# Patient Record
Sex: MALE | Race: BLACK OR AFRICAN AMERICAN | NOT HISPANIC OR LATINO | Employment: UNEMPLOYED | RURAL
[De-identification: names, ages, dates, MRNs, and addresses within clinical notes are randomized per-mention and may not be internally consistent; named-entity substitution may affect disease eponyms.]

---

## 2020-06-25 ENCOUNTER — HISTORICAL (OUTPATIENT)
Dept: ADMINISTRATIVE | Facility: HOSPITAL | Age: 55
End: 2020-06-25

## 2020-07-15 ENCOUNTER — HISTORICAL (OUTPATIENT)
Dept: ADMINISTRATIVE | Facility: HOSPITAL | Age: 55
End: 2020-07-15

## 2021-03-08 DIAGNOSIS — M25.551 RIGHT HIP PAIN: Primary | ICD-10-CM

## 2021-08-17 ENCOUNTER — OFFICE VISIT (OUTPATIENT)
Dept: FAMILY MEDICINE | Facility: CLINIC | Age: 56
End: 2021-08-17
Payer: COMMERCIAL

## 2021-08-17 VITALS
BODY MASS INDEX: 43.71 KG/M2 | SYSTOLIC BLOOD PRESSURE: 135 MMHG | WEIGHT: 256 LBS | OXYGEN SATURATION: 99 % | HEIGHT: 64 IN | RESPIRATION RATE: 20 BRPM | DIASTOLIC BLOOD PRESSURE: 87 MMHG | HEART RATE: 83 BPM

## 2021-08-17 DIAGNOSIS — Z00.00 ENCOUNTER FOR GENERAL ADULT MEDICAL EXAMINATION W/O ABNORMAL FINDINGS: ICD-10-CM

## 2021-08-17 DIAGNOSIS — Z11.59 ENCOUNTER FOR HEPATITIS C SCREENING TEST FOR LOW RISK PATIENT: Primary | ICD-10-CM

## 2021-08-17 DIAGNOSIS — Z23 ENCOUNTER FOR IMMUNIZATION: ICD-10-CM

## 2021-08-17 DIAGNOSIS — F31.9 BIPOLAR 1 DISORDER: ICD-10-CM

## 2021-08-17 DIAGNOSIS — Z13.220 SCREENING FOR LIPID DISORDERS: ICD-10-CM

## 2021-08-17 LAB
ALBUMIN SERPL BCP-MCNC: 3.1 G/DL (ref 3.5–5)
ALP SERPL-CCNC: 78 U/L (ref 45–115)
ALT SERPL W P-5'-P-CCNC: 22 U/L (ref 16–61)
ANION GAP SERPL CALCULATED.3IONS-SCNC: 9 MMOL/L (ref 7–16)
AST SERPL W P-5'-P-CCNC: 10 U/L (ref 15–37)
BASOPHILS # BLD AUTO: 0.11 K/UL (ref 0–0.2)
BASOPHILS NFR BLD AUTO: 1.1 % (ref 0–1)
BILIRUB DIRECT SERPL-MCNC: <0.1 MG/DL (ref 0–0.2)
BILIRUB SERPL-MCNC: 0.1 MG/DL (ref 0–1.2)
BUN SERPL-MCNC: 7 MG/DL (ref 7–18)
BUN/CREAT SERPL: 7 (ref 6–20)
CALCIUM SERPL-MCNC: 8.6 MG/DL (ref 8.5–10.1)
CHLORIDE SERPL-SCNC: 111 MMOL/L (ref 98–107)
CHOLEST SERPL-MCNC: 145 MG/DL (ref 0–200)
CHOLEST/HDLC SERPL: 4.4 {RATIO}
CO2 SERPL-SCNC: 26 MMOL/L (ref 21–32)
CREAT SERPL-MCNC: 1.06 MG/DL (ref 0.7–1.3)
DIFFERENTIAL METHOD BLD: ABNORMAL
EOSINOPHIL # BLD AUTO: 0.77 K/UL (ref 0–0.5)
EOSINOPHIL NFR BLD AUTO: 7.4 % (ref 1–4)
ERYTHROCYTE [DISTWIDTH] IN BLOOD BY AUTOMATED COUNT: 13.7 % (ref 11.5–14.5)
GLUCOSE SERPL-MCNC: 96 MG/DL (ref 74–106)
HCT VFR BLD AUTO: 42.5 % (ref 40–54)
HCV AB SER QL: NORMAL
HDLC SERPL-MCNC: 33 MG/DL (ref 40–60)
HGB BLD-MCNC: 13.8 G/DL (ref 13.5–18)
IMM GRANULOCYTES # BLD AUTO: 0.1 K/UL (ref 0–0.04)
IMM GRANULOCYTES NFR BLD: 1 % (ref 0–0.4)
LDLC SERPL CALC-MCNC: 71 MG/DL
LDLC/HDLC SERPL: 2.2 {RATIO}
LYMPHOCYTES # BLD AUTO: 2.65 K/UL (ref 1–4.8)
LYMPHOCYTES NFR BLD AUTO: 25.6 % (ref 27–41)
MCH RBC QN AUTO: 30.3 PG (ref 27–31)
MCHC RBC AUTO-ENTMCNC: 32.5 G/DL (ref 32–36)
MCV RBC AUTO: 93.4 FL (ref 80–96)
MONOCYTES # BLD AUTO: 1.16 K/UL (ref 0–0.8)
MONOCYTES NFR BLD AUTO: 11.2 % (ref 2–6)
MPC BLD CALC-MCNC: 10.2 FL (ref 9.4–12.4)
NEUTROPHILS # BLD AUTO: 5.57 K/UL (ref 1.8–7.7)
NEUTROPHILS NFR BLD AUTO: 53.7 % (ref 53–65)
NONHDLC SERPL-MCNC: 112 MG/DL
NRBC # BLD AUTO: 0 X10E3/UL
NRBC, AUTO (.00): 0 %
PLATELET # BLD AUTO: 300 K/UL (ref 150–400)
POTASSIUM SERPL-SCNC: 4.3 MMOL/L (ref 3.5–5.1)
PROT SERPL-MCNC: 6.7 G/DL (ref 6.4–8.2)
RBC # BLD AUTO: 4.55 M/UL (ref 4.6–6.2)
SODIUM SERPL-SCNC: 142 MMOL/L (ref 136–145)
TRIGL SERPL-MCNC: 206 MG/DL (ref 35–150)
VLDLC SERPL-MCNC: 41 MG/DL
WBC # BLD AUTO: 10.36 K/UL (ref 4.5–11)

## 2021-08-17 PROCEDURE — 80061 LIPID PANEL: CPT | Mod: ,,, | Performed by: CLINICAL MEDICAL LABORATORY

## 2021-08-17 PROCEDURE — 90715 TDAP VACCINE GREATER THAN OR EQUAL TO 7YO IM: ICD-10-PCS | Mod: ,,, | Performed by: INTERNAL MEDICINE

## 2021-08-17 PROCEDURE — 99386 PREV VISIT NEW AGE 40-64: CPT | Mod: 25,,, | Performed by: INTERNAL MEDICINE

## 2021-08-17 PROCEDURE — 86803 HEPATITIS C AB TEST: CPT | Mod: ,,, | Performed by: CLINICAL MEDICAL LABORATORY

## 2021-08-17 PROCEDURE — 90471 ZOSTER RECOMBINANT VACCINE: ICD-10-PCS | Mod: ,,, | Performed by: INTERNAL MEDICINE

## 2021-08-17 PROCEDURE — 90472 TDAP VACCINE GREATER THAN OR EQUAL TO 7YO IM: ICD-10-PCS | Mod: ,,, | Performed by: INTERNAL MEDICINE

## 2021-08-17 PROCEDURE — 82248 BILIRUBIN DIRECT: CPT | Mod: ,,, | Performed by: CLINICAL MEDICAL LABORATORY

## 2021-08-17 PROCEDURE — 90472 IMMUNIZATION ADMIN EACH ADD: CPT | Mod: ,,, | Performed by: INTERNAL MEDICINE

## 2021-08-17 PROCEDURE — 80053 COMPREHEN METABOLIC PANEL: CPT | Mod: ,,, | Performed by: CLINICAL MEDICAL LABORATORY

## 2021-08-17 PROCEDURE — 90750 HZV VACC RECOMBINANT IM: CPT | Mod: ,,, | Performed by: INTERNAL MEDICINE

## 2021-08-17 PROCEDURE — 85025 COMPLETE CBC W/AUTO DIFF WBC: CPT | Mod: ,,, | Performed by: CLINICAL MEDICAL LABORATORY

## 2021-08-17 PROCEDURE — 85025 CBC WITH DIFFERENTIAL: ICD-10-PCS | Mod: ,,, | Performed by: CLINICAL MEDICAL LABORATORY

## 2021-08-17 PROCEDURE — 82248 HEPATIC FUNCTION PANEL: ICD-10-PCS | Mod: ,,, | Performed by: CLINICAL MEDICAL LABORATORY

## 2021-08-17 PROCEDURE — 90715 TDAP VACCINE 7 YRS/> IM: CPT | Mod: ,,, | Performed by: INTERNAL MEDICINE

## 2021-08-17 PROCEDURE — 80053 BASIC METABOLIC PANEL: ICD-10-PCS | Mod: ,,, | Performed by: CLINICAL MEDICAL LABORATORY

## 2021-08-17 PROCEDURE — 90471 IMMUNIZATION ADMIN: CPT | Mod: ,,, | Performed by: INTERNAL MEDICINE

## 2021-08-17 PROCEDURE — 99386 PR PREVENTIVE VISIT,NEW,40-64: ICD-10-PCS | Mod: 25,,, | Performed by: INTERNAL MEDICINE

## 2021-08-17 PROCEDURE — 80061 LIPID PANEL: ICD-10-PCS | Mod: ,,, | Performed by: CLINICAL MEDICAL LABORATORY

## 2021-08-17 PROCEDURE — 86803 HEPATITIS C ANTIBODY: ICD-10-PCS | Mod: ,,, | Performed by: CLINICAL MEDICAL LABORATORY

## 2021-08-17 PROCEDURE — 90750 ZOSTER RECOMBINANT VACCINE: ICD-10-PCS | Mod: ,,, | Performed by: INTERNAL MEDICINE

## 2021-08-17 RX ORDER — ASPIRIN 325 MG
325 TABLET ORAL DAILY
COMMUNITY
End: 2022-01-06

## 2021-08-17 RX ORDER — ZALEPLON 5 MG/1
5 CAPSULE ORAL NIGHTLY PRN
COMMUNITY
End: 2022-08-31

## 2021-08-17 RX ORDER — CARIPRAZINE 3 MG/1
3 CAPSULE, GELATIN COATED ORAL DAILY
COMMUNITY

## 2021-12-16 ENCOUNTER — OFFICE VISIT (OUTPATIENT)
Dept: FAMILY MEDICINE | Facility: CLINIC | Age: 56
End: 2021-12-16
Payer: COMMERCIAL

## 2021-12-16 ENCOUNTER — APPOINTMENT (OUTPATIENT)
Dept: RADIOLOGY | Facility: CLINIC | Age: 56
End: 2021-12-16
Attending: INTERNAL MEDICINE
Payer: MEDICARE

## 2021-12-16 VITALS
SYSTOLIC BLOOD PRESSURE: 133 MMHG | RESPIRATION RATE: 19 BRPM | HEIGHT: 77 IN | OXYGEN SATURATION: 99 % | WEIGHT: 242 LBS | BODY MASS INDEX: 28.57 KG/M2 | DIASTOLIC BLOOD PRESSURE: 94 MMHG | HEART RATE: 89 BPM

## 2021-12-16 DIAGNOSIS — S61.411A CUT OF RIGHT HAND, INITIAL ENCOUNTER: Primary | ICD-10-CM

## 2021-12-16 DIAGNOSIS — Z11.59 ENCOUNTER FOR HEPATITIS C SCREENING TEST FOR LOW RISK PATIENT: ICD-10-CM

## 2021-12-16 DIAGNOSIS — S61.411A CUT OF RIGHT HAND, INITIAL ENCOUNTER: ICD-10-CM

## 2021-12-16 DIAGNOSIS — Z23 ENCOUNTER FOR IMMUNIZATION: ICD-10-CM

## 2021-12-16 DIAGNOSIS — M54.9 BACK PAIN, UNSPECIFIED BACK LOCATION, UNSPECIFIED BACK PAIN LATERALITY, UNSPECIFIED CHRONICITY: ICD-10-CM

## 2021-12-16 DIAGNOSIS — Z13.220 SCREENING FOR LIPID DISORDERS: ICD-10-CM

## 2021-12-16 PROCEDURE — 99214 PR OFFICE/OUTPT VISIT, EST, LEVL IV, 30-39 MIN: ICD-10-PCS | Mod: ,,, | Performed by: INTERNAL MEDICINE

## 2021-12-16 PROCEDURE — 73140 X-RAY EXAM OF FINGER(S): CPT | Mod: TC,RHCUB,RT | Performed by: INTERNAL MEDICINE

## 2021-12-16 PROCEDURE — 99214 OFFICE O/P EST MOD 30 MIN: CPT | Mod: ,,, | Performed by: INTERNAL MEDICINE

## 2021-12-16 RX ORDER — NAPROXEN 500 MG/1
500 TABLET ORAL 2 TIMES DAILY
Qty: 20 TABLET | Refills: 0 | Status: SHIPPED | OUTPATIENT
Start: 2021-12-16 | End: 2022-02-01 | Stop reason: SDUPTHER

## 2021-12-16 RX ORDER — CYCLOBENZAPRINE HCL 10 MG
10 TABLET ORAL 3 TIMES DAILY PRN
Qty: 20 TABLET | Refills: 0 | Status: SHIPPED | OUTPATIENT
Start: 2021-12-16 | End: 2021-12-26

## 2021-12-16 RX ORDER — SULFAMETHOXAZOLE AND TRIMETHOPRIM 800; 160 MG/1; MG/1
1 TABLET ORAL 2 TIMES DAILY
Qty: 20 TABLET | Refills: 0 | Status: SHIPPED | OUTPATIENT
Start: 2021-12-16 | End: 2022-01-06

## 2021-12-23 ENCOUNTER — IMMUNIZATION (OUTPATIENT)
Dept: FAMILY MEDICINE | Facility: CLINIC | Age: 56
End: 2021-12-23
Payer: MEDICAID

## 2021-12-23 DIAGNOSIS — Z23 NEED FOR VACCINATION: Primary | ICD-10-CM

## 2021-12-23 PROCEDURE — 0064A COVID-19, MRNA, LNP-S, PF, 100 MCG/0.25 ML DOSE VACCINE (MODERNA BOOSTER): ICD-10-PCS | Mod: ,,, | Performed by: INTERNAL MEDICINE

## 2021-12-23 PROCEDURE — 91306 COVID-19, MRNA, LNP-S, PF, 100 MCG/0.25 ML DOSE VACCINE (MODERNA BOOSTER): CPT | Mod: ,,, | Performed by: INTERNAL MEDICINE

## 2021-12-23 PROCEDURE — 91306 COVID-19, MRNA, LNP-S, PF, 100 MCG/0.25 ML DOSE VACCINE (MODERNA BOOSTER): ICD-10-PCS | Mod: ,,, | Performed by: INTERNAL MEDICINE

## 2021-12-23 PROCEDURE — 0064A COVID-19, MRNA, LNP-S, PF, 100 MCG/0.25 ML DOSE VACCINE (MODERNA BOOSTER): CPT | Mod: ,,, | Performed by: INTERNAL MEDICINE

## 2022-01-06 ENCOUNTER — OFFICE VISIT (OUTPATIENT)
Dept: FAMILY MEDICINE | Facility: CLINIC | Age: 57
End: 2022-01-06
Payer: COMMERCIAL

## 2022-01-06 VITALS
RESPIRATION RATE: 20 BRPM | WEIGHT: 243.63 LBS | HEART RATE: 53 BPM | OXYGEN SATURATION: 100 % | HEIGHT: 76 IN | SYSTOLIC BLOOD PRESSURE: 151 MMHG | DIASTOLIC BLOOD PRESSURE: 84 MMHG | BODY MASS INDEX: 29.67 KG/M2

## 2022-01-06 DIAGNOSIS — L03.818 CELLULITIS OF OTHER SPECIFIED SITE: ICD-10-CM

## 2022-01-06 DIAGNOSIS — G89.29 CHRONIC MIDLINE LOW BACK PAIN WITH BILATERAL SCIATICA: Primary | ICD-10-CM

## 2022-01-06 DIAGNOSIS — M54.42 CHRONIC MIDLINE LOW BACK PAIN WITH BILATERAL SCIATICA: Primary | ICD-10-CM

## 2022-01-06 DIAGNOSIS — N52.9 ERECTILE DYSFUNCTION, UNSPECIFIED ERECTILE DYSFUNCTION TYPE: ICD-10-CM

## 2022-01-06 DIAGNOSIS — T14.8XXA MUSCLE STRAIN: ICD-10-CM

## 2022-01-06 DIAGNOSIS — M54.41 CHRONIC MIDLINE LOW BACK PAIN WITH BILATERAL SCIATICA: Primary | ICD-10-CM

## 2022-01-06 PROCEDURE — 1160F PR REVIEW ALL MEDS BY PRESCRIBER/CLIN PHARMACIST DOCUMENTED: ICD-10-PCS | Mod: CPTII,,, | Performed by: INTERNAL MEDICINE

## 2022-01-06 PROCEDURE — 3077F PR MOST RECENT SYSTOLIC BLOOD PRESSURE >= 140 MM HG: ICD-10-PCS | Mod: CPTII,,, | Performed by: INTERNAL MEDICINE

## 2022-01-06 PROCEDURE — 1159F PR MEDICATION LIST DOCUMENTED IN MEDICAL RECORD: ICD-10-PCS | Mod: CPTII,,, | Performed by: INTERNAL MEDICINE

## 2022-01-06 PROCEDURE — 3008F PR BODY MASS INDEX (BMI) DOCUMENTED: ICD-10-PCS | Mod: CPTII,,, | Performed by: INTERNAL MEDICINE

## 2022-01-06 PROCEDURE — 99214 PR OFFICE/OUTPT VISIT, EST, LEVL IV, 30-39 MIN: ICD-10-PCS | Mod: ,,, | Performed by: INTERNAL MEDICINE

## 2022-01-06 PROCEDURE — 3079F DIAST BP 80-89 MM HG: CPT | Mod: CPTII,,, | Performed by: INTERNAL MEDICINE

## 2022-01-06 PROCEDURE — 99214 OFFICE O/P EST MOD 30 MIN: CPT | Mod: ,,, | Performed by: INTERNAL MEDICINE

## 2022-01-06 PROCEDURE — 3077F SYST BP >= 140 MM HG: CPT | Mod: CPTII,,, | Performed by: INTERNAL MEDICINE

## 2022-01-06 PROCEDURE — 3079F PR MOST RECENT DIASTOLIC BLOOD PRESSURE 80-89 MM HG: ICD-10-PCS | Mod: CPTII,,, | Performed by: INTERNAL MEDICINE

## 2022-01-06 PROCEDURE — 1160F RVW MEDS BY RX/DR IN RCRD: CPT | Mod: CPTII,,, | Performed by: INTERNAL MEDICINE

## 2022-01-06 PROCEDURE — 1159F MED LIST DOCD IN RCRD: CPT | Mod: CPTII,,, | Performed by: INTERNAL MEDICINE

## 2022-01-06 PROCEDURE — 3008F BODY MASS INDEX DOCD: CPT | Mod: CPTII,,, | Performed by: INTERNAL MEDICINE

## 2022-01-06 RX ORDER — SILDENAFIL 100 MG/1
50 TABLET, FILM COATED ORAL DAILY PRN
Qty: 7 TABLET | Refills: 2 | Status: SHIPPED | OUTPATIENT
Start: 2022-01-06 | End: 2022-08-31

## 2022-01-06 RX ORDER — HYDROCODONE BITARTRATE AND ACETAMINOPHEN 7.5; 325 MG/1; MG/1
1 TABLET ORAL 2 TIMES DAILY PRN
Qty: 14 TABLET | Refills: 0 | Status: SHIPPED | OUTPATIENT
Start: 2022-01-06 | End: 2022-02-01 | Stop reason: SDUPTHER

## 2022-01-06 RX ORDER — CEPHALEXIN 500 MG/1
500 CAPSULE ORAL 3 TIMES DAILY
Qty: 21 CAPSULE | Refills: 0 | Status: SHIPPED | OUTPATIENT
Start: 2022-01-06 | End: 2022-04-06

## 2022-01-10 NOTE — PROGRESS NOTES
Patient seen on 01/06/2022 patient is awake alert patient is in no acute distress patient complains of chronic muscles pain strain.  Patient complains of severe redness in his right thumb.  Patient complains of erectile dysfunction.  Patient states that the pain in his lower back is severe.    Blood pressure 150/84 temp 90° respirations 20 pulse 53 O2 sats 100 present    Lungs are clear no crackles or wheezing cardiovascular S1-S2 regular rate rhythm abdomen is soft positive bowel sounds nontender extremities right thumb is swollen and indurated red and tender to the touch    He does have diffuse tenderness in his lower back.    Patient has low back pain,, muscle strain, erectile dysfunction, is cellulitis of the right thumb.  The plan consult Occupational therapy, Keflex 500 mg 1 p.o. 3 times a day for 7 days Viagra 100 mg 1/2 tablet 30 minutes before intercourse generic take 30 minutes before intercourse and Norco 7.5 mg 1 p.o. b.i.d. p.r.n. severe pain disposition 21

## 2022-01-31 ENCOUNTER — OFFICE VISIT (OUTPATIENT)
Dept: FAMILY MEDICINE | Facility: CLINIC | Age: 57
End: 2022-01-31
Payer: COMMERCIAL

## 2022-01-31 VITALS
RESPIRATION RATE: 18 BRPM | OXYGEN SATURATION: 98 % | HEIGHT: 77 IN | DIASTOLIC BLOOD PRESSURE: 95 MMHG | SYSTOLIC BLOOD PRESSURE: 145 MMHG | BODY MASS INDEX: 29.97 KG/M2 | HEART RATE: 81 BPM | TEMPERATURE: 99 F | WEIGHT: 253.81 LBS

## 2022-01-31 DIAGNOSIS — N20.0 KIDNEY STONE: ICD-10-CM

## 2022-01-31 DIAGNOSIS — M54.42 CHRONIC MIDLINE LOW BACK PAIN WITH BILATERAL SCIATICA: Primary | ICD-10-CM

## 2022-01-31 DIAGNOSIS — R35.1 NOCTURIA: ICD-10-CM

## 2022-01-31 DIAGNOSIS — M41.9 SCOLIOSIS, UNSPECIFIED SCOLIOSIS TYPE, UNSPECIFIED SPINAL REGION: ICD-10-CM

## 2022-01-31 DIAGNOSIS — G89.29 CHRONIC MIDLINE LOW BACK PAIN WITH BILATERAL SCIATICA: Primary | ICD-10-CM

## 2022-01-31 DIAGNOSIS — M54.41 CHRONIC MIDLINE LOW BACK PAIN WITH BILATERAL SCIATICA: Primary | ICD-10-CM

## 2022-01-31 DIAGNOSIS — R35.0 URINARY FREQUENCY: ICD-10-CM

## 2022-01-31 LAB
BILIRUB UR QL STRIP: NEGATIVE
CLARITY UR: CLEAR
COLOR UR: YELLOW
GLUCOSE UR STRIP-MCNC: NEGATIVE MG/DL
KETONES UR STRIP-SCNC: NEGATIVE MG/DL
LEUKOCYTE ESTERASE UR QL STRIP: NEGATIVE
NITRITE UR QL STRIP: NEGATIVE
PH UR STRIP: 5.5 PH UNITS
PROT UR QL STRIP: NEGATIVE
RBC # UR STRIP: NEGATIVE /UL
SP GR UR STRIP: 1.02
UROBILINOGEN UR STRIP-ACNC: 0.2 MG/DL

## 2022-01-31 PROCEDURE — 1160F RVW MEDS BY RX/DR IN RCRD: CPT | Mod: CPTII,,, | Performed by: INTERNAL MEDICINE

## 2022-01-31 PROCEDURE — 3077F PR MOST RECENT SYSTOLIC BLOOD PRESSURE >= 140 MM HG: ICD-10-PCS | Mod: CPTII,,, | Performed by: INTERNAL MEDICINE

## 2022-01-31 PROCEDURE — 3008F PR BODY MASS INDEX (BMI) DOCUMENTED: ICD-10-PCS | Mod: CPTII,,, | Performed by: INTERNAL MEDICINE

## 2022-01-31 PROCEDURE — 3008F BODY MASS INDEX DOCD: CPT | Mod: CPTII,,, | Performed by: INTERNAL MEDICINE

## 2022-01-31 PROCEDURE — 99214 OFFICE O/P EST MOD 30 MIN: CPT | Mod: ,,, | Performed by: INTERNAL MEDICINE

## 2022-01-31 PROCEDURE — 81003 URINALYSIS, REFLEX TO URINE CULTURE: ICD-10-PCS | Mod: QW,,, | Performed by: CLINICAL MEDICAL LABORATORY

## 2022-01-31 PROCEDURE — 3080F PR MOST RECENT DIASTOLIC BLOOD PRESSURE >= 90 MM HG: ICD-10-PCS | Mod: CPTII,,, | Performed by: INTERNAL MEDICINE

## 2022-01-31 PROCEDURE — 3080F DIAST BP >= 90 MM HG: CPT | Mod: CPTII,,, | Performed by: INTERNAL MEDICINE

## 2022-01-31 PROCEDURE — 1160F PR REVIEW ALL MEDS BY PRESCRIBER/CLIN PHARMACIST DOCUMENTED: ICD-10-PCS | Mod: CPTII,,, | Performed by: INTERNAL MEDICINE

## 2022-01-31 PROCEDURE — 1159F PR MEDICATION LIST DOCUMENTED IN MEDICAL RECORD: ICD-10-PCS | Mod: CPTII,,, | Performed by: INTERNAL MEDICINE

## 2022-01-31 PROCEDURE — 81003 URINALYSIS AUTO W/O SCOPE: CPT | Mod: QW,,, | Performed by: CLINICAL MEDICAL LABORATORY

## 2022-01-31 PROCEDURE — 99214 PR OFFICE/OUTPT VISIT, EST, LEVL IV, 30-39 MIN: ICD-10-PCS | Mod: ,,, | Performed by: INTERNAL MEDICINE

## 2022-01-31 PROCEDURE — 3077F SYST BP >= 140 MM HG: CPT | Mod: CPTII,,, | Performed by: INTERNAL MEDICINE

## 2022-01-31 PROCEDURE — 1159F MED LIST DOCD IN RCRD: CPT | Mod: CPTII,,, | Performed by: INTERNAL MEDICINE

## 2022-01-31 NOTE — PROGRESS NOTES
Subjective:       Patient ID: Hola Burrell is a 57 y.o. male.    Chief Complaint: Medication Refill (Refill on pain meds)    Patient is here today for check up evaluation. Patient reports still has lower back pain. Recent Xray of back shows mild to moderate scoliosis and questionable kidney stone. Patient states he has noticed and increase in urination at night. Will US bilateral kidneys for possible calculi. Will also order Flomax 0.4 mg PO qhs and order UA with C&S. Patient endorses pain on palpation of lower back. Will refer to pain treatment. Xray of right finger reviewed and normal. Will follow in 2 months.       Current Medications:    Current Outpatient Medications:     cariprazine (VRAYLAR) 3 mg Cap, Take 3 mg by mouth once daily., Disp: , Rfl:     HYDROcodone-acetaminophen (NORCO) 7.5-325 mg per tablet, Take 1 tablet by mouth 2 (two) times daily as needed (Severe pain)., Disp: 14 tablet, Rfl: 0    naproxen (NAPROSYN) 500 MG tablet, Take 1 tablet (500 mg total) by mouth 2 (two) times daily., Disp: 20 tablet, Rfl: 0    sildenafiL (VIAGRA) 100 MG tablet, Take 0.5 tablets (50 mg total) by mouth daily as needed for Erectile Dysfunction (Take 30 minutes prior to intercourse)., Disp: 7 tablet, Rfl: 2    zaleplon (SONATA) 5 MG Cap, Take 5 mg by mouth nightly as needed., Disp: , Rfl:     cephALEXin (KEFLEX) 500 MG capsule, Take 1 capsule (500 mg total) by mouth 3 (three) times daily. (Patient not taking: Reported on 1/31/2022), Disp: 21 capsule, Rfl: 0    Last Labs:     No visits with results within 1 Month(s) from this visit.   Latest known visit with results is:   Office Visit on 08/17/2021   Component Date Value    Sodium 08/17/2021 142     Potassium 08/17/2021 4.3     Chloride 08/17/2021 111*    CO2 08/17/2021 26     Anion Gap 08/17/2021 9     Glucose 08/17/2021 96     BUN 08/17/2021 7     Creatinine 08/17/2021 1.06     BUN/Creatinine Ratio 08/17/2021 7     Calcium 08/17/2021 8.6      eGFR  08/17/2021 94     Hepatitis C Ab 08/17/2021 Non-Reactive     Total Protein 08/17/2021 6.7     Albumin 08/17/2021 3.1*    Bilirubin, Total 08/17/2021 0.1     Bilirubin, Direct 08/17/2021 <0.1     AST 08/17/2021 10*    ALT 08/17/2021 22     Alk Phos 08/17/2021 78     Triglycerides 08/17/2021 206*    Cholesterol 08/17/2021 145     HDL Cholesterol 08/17/2021 33*    Cholesterol/HDL Ratio (R* 08/17/2021 4.4     Non-HDL 08/17/2021 112     LDL Calculated 08/17/2021 71     LDL/HDL 08/17/2021 2.2     VLDL 08/17/2021 41     WBC 08/17/2021 10.36     RBC 08/17/2021 4.55*    Hemoglobin 08/17/2021 13.8     Hematocrit 08/17/2021 42.5     MCV 08/17/2021 93.4     MCH 08/17/2021 30.3     MCHC 08/17/2021 32.5     RDW 08/17/2021 13.7     Platelet Count 08/17/2021 300     MPV 08/17/2021 10.2     Neutrophils % 08/17/2021 53.7     Lymphocytes % 08/17/2021 25.6*    Monocytes % 08/17/2021 11.2*    Eosinophils % 08/17/2021 7.4*    Basophils % 08/17/2021 1.1*    Immature Granulocytes % 08/17/2021 1.0*    nRBC, Auto 08/17/2021 0.0     Neutrophils, Abs 08/17/2021 5.57     Lymphocytes, Absolute 08/17/2021 2.65     Monocytes, Absolute 08/17/2021 1.16*    Eosinophils, Absolute 08/17/2021 0.77*    Basophils, Absolute 08/17/2021 0.11     Immature Granulocytes, A* 08/17/2021 0.10*    nRBC, Absolute 08/17/2021 0.00     Diff Type 08/17/2021 Auto        Last Imaging:  X-Ray Lumbar Spine Ap And Lateral  Narrative: EXAMINATION:  XR LUMBAR SPINE AP AND LATERAL    CLINICAL HISTORY:  Dorsalgia, unspecified    FINDINGS:  There is a mild spondylolisthesis of L5 with respect to S1 and L4.  There is mild retrolisthesis of L3 with respect L4.  There are moderate osteophytes and disc space height loss throughout the lumbar spine with lower lumbar facet arthropathy.  No focal vertebral body height loss seen    Mild-to-moderate scoliosis with the convexity to the left    Vascular calcifications  present    There are questionable bilateral renal calculi  Impression: 1. Mild-to-moderate scoliosis and degenerative changes  2. Questionable nephrolithiasis  3. Mild spondylolisthesis of L5  4. Mild retrolisthesis at L3-4    Electronically signed by: Padmini Saul  Date:    12/16/2021  Time:    13:54  X-Ray Finger 2 or More Views Right  Narrative: EXAMINATION:  XR FINGER 2 OR MORE VIEWS RIGHT    CLINICAL HISTORY:  Laceration without foreign body of right hand, initial encounter    FINDINGS:  No acute osseous, articular, or soft tissue abnormality is seen  Impression: No acute pathology seen    Electronically signed by: Padmini Saul  Date:    12/16/2021  Time:    13:49         Review of Systems   Genitourinary: Positive for frequency and urgency.   Musculoskeletal: Positive for back pain.   All other systems reviewed and are negative.        Objective:      Physical Exam  Vitals reviewed.   Constitutional:       Appearance: Normal appearance.   Cardiovascular:      Rate and Rhythm: Normal rate and regular rhythm.      Pulses: Normal pulses.      Heart sounds: Normal heart sounds.   Pulmonary:      Effort: Pulmonary effort is normal.      Breath sounds: Normal breath sounds.   Abdominal:      General: Abdomen is flat. Bowel sounds are normal.      Palpations: Abdomen is soft.   Musculoskeletal:         General: Normal range of motion.      Cervical back: Normal range of motion and neck supple.   Skin:     General: Skin is warm and dry.   Neurological:      General: No focal deficit present.      Mental Status: He is alert and oriented to person, place, and time. Mental status is at baseline.         Assessment:       1. Chronic midline low back pain with bilateral sciatica     2. Urinary frequency  Urinalysis, Reflex to Urine Culture Urine, Clean Catch    Ambulatory referral/consult to Pain Clinic    Urinalysis, Reflex to Urine Culture Urine, Clean Catch   3. Nocturia     4. Kidney stone  US Kidney   5. Scoliosis,  unspecified scoliosis type, unspecified spinal region          Plan:         Hola was seen today for medication refill.    Diagnoses and all orders for this visit:    Chronic midline low back pain with bilateral sciatica    Urinary frequency  -     Urinalysis, Reflex to Urine Culture Urine, Clean Catch; Future  -     Ambulatory referral/consult to Pain Clinic; Future  -     Urinalysis, Reflex to Urine Culture Urine, Clean Catch    Nocturia    Kidney stone  -     US Kidney; Future    Scoliosis, unspecified scoliosis type, unspecified spinal region    Other orders  The following orders have not been finalized:  -     tamsulosin (FLOMAX) 0.4 mg Cap

## 2022-01-31 NOTE — PATIENT INSTRUCTIONS
Hola was seen today for medication refill.    Diagnoses and all orders for this visit:    Chronic midline low back pain with bilateral sciatica    Urinary frequency  -     Urinalysis, Reflex to Urine Culture Urine, Clean Catch; Future  -     Ambulatory referral/consult to Pain Clinic; Future  -     Urinalysis, Reflex to Urine Culture Urine, Clean Catch    Nocturia    Kidney stone  -     US Kidney; Future    Scoliosis, unspecified scoliosis type, unspecified spinal region    Other orders  The following orders have not been finalized:  -     tamsulosin (FLOMAX) 0.4 mg Cap

## 2022-02-01 RX ORDER — TAMSULOSIN HYDROCHLORIDE 0.4 MG/1
0.4 CAPSULE ORAL DAILY
Qty: 30 CAPSULE | Refills: 1 | Status: SHIPPED | OUTPATIENT
Start: 2022-02-01 | End: 2022-04-06 | Stop reason: SDUPTHER

## 2022-02-02 RX ORDER — NAPROXEN 500 MG/1
500 TABLET ORAL 2 TIMES DAILY
Qty: 20 TABLET | Refills: 0 | Status: SHIPPED | OUTPATIENT
Start: 2022-02-02 | End: 2022-04-06

## 2022-02-02 RX ORDER — HYDROCODONE BITARTRATE AND ACETAMINOPHEN 7.5; 325 MG/1; MG/1
1 TABLET ORAL 2 TIMES DAILY PRN
Qty: 14 TABLET | Refills: 0 | Status: SHIPPED | OUTPATIENT
Start: 2022-02-02 | End: 2022-08-31 | Stop reason: ALTCHOICE

## 2022-03-04 ENCOUNTER — OFFICE VISIT (OUTPATIENT)
Dept: PAIN MEDICINE | Facility: CLINIC | Age: 57
End: 2022-03-04
Payer: MEDICARE

## 2022-03-04 VITALS
BODY MASS INDEX: 29.83 KG/M2 | WEIGHT: 245 LBS | HEIGHT: 76 IN | DIASTOLIC BLOOD PRESSURE: 108 MMHG | HEART RATE: 114 BPM | SYSTOLIC BLOOD PRESSURE: 152 MMHG

## 2022-03-04 DIAGNOSIS — R35.0 URINARY FREQUENCY: ICD-10-CM

## 2022-03-04 DIAGNOSIS — M54.50 CHRONIC BILATERAL LOW BACK PAIN WITHOUT SCIATICA: ICD-10-CM

## 2022-03-04 DIAGNOSIS — Z79.899 ENCOUNTER FOR LONG-TERM (CURRENT) USE OF OTHER MEDICATIONS: Primary | ICD-10-CM

## 2022-03-04 DIAGNOSIS — M47.817 SPONDYLOSIS OF LUMBOSACRAL REGION WITHOUT MYELOPATHY OR RADICULOPATHY: ICD-10-CM

## 2022-03-04 DIAGNOSIS — G89.29 CHRONIC BILATERAL LOW BACK PAIN WITHOUT SCIATICA: ICD-10-CM

## 2022-03-04 LAB
CTP QC/QA: YES
POC (AMP) AMPHETAMINE: ABNORMAL
POC (BAR) BARBITURATES: NEGATIVE
POC (BUP) BUPRENORPHINE: NEGATIVE
POC (BZO) BENZODIAZEPINES: NEGATIVE
POC (COC) COCAINE: NEGATIVE
POC (MDMA) METHYLENEDIOXYMETHAMPHETAMINE 3,4: NEGATIVE
POC (MET) METHAMPHETAMINE: ABNORMAL
POC (MOP) OPIATES: NEGATIVE
POC (MTD) METHADONE: NEGATIVE
POC (OXY) OXYCODONE: NEGATIVE
POC (PCP) PHENCYCLIDINE: NEGATIVE
POC (TCA) TRICYCLIC ANTIDEPRESSANTS: NEGATIVE
POC TEMPERATURE (URINE): 90
POC THC: ABNORMAL

## 2022-03-04 PROCEDURE — 99203 OFFICE O/P NEW LOW 30 MIN: CPT | Mod: S$PBB,,, | Performed by: PAIN MEDICINE

## 2022-03-04 PROCEDURE — 3080F DIAST BP >= 90 MM HG: CPT | Mod: CPTII,,, | Performed by: PAIN MEDICINE

## 2022-03-04 PROCEDURE — 1159F PR MEDICATION LIST DOCUMENTED IN MEDICAL RECORD: ICD-10-PCS | Mod: CPTII,,, | Performed by: PAIN MEDICINE

## 2022-03-04 PROCEDURE — 1159F MED LIST DOCD IN RCRD: CPT | Mod: CPTII,,, | Performed by: PAIN MEDICINE

## 2022-03-04 PROCEDURE — 3077F PR MOST RECENT SYSTOLIC BLOOD PRESSURE >= 140 MM HG: ICD-10-PCS | Mod: CPTII,,, | Performed by: PAIN MEDICINE

## 2022-03-04 PROCEDURE — G0481 PR DRUG TEST DEF 8-14 CLASSES: ICD-10-PCS | Mod: ,,, | Performed by: CLINICAL MEDICAL LABORATORY

## 2022-03-04 PROCEDURE — 3008F PR BODY MASS INDEX (BMI) DOCUMENTED: ICD-10-PCS | Mod: CPTII,,, | Performed by: PAIN MEDICINE

## 2022-03-04 PROCEDURE — 80305 DRUG TEST PRSMV DIR OPT OBS: CPT | Mod: PBBFAC | Performed by: PAIN MEDICINE

## 2022-03-04 PROCEDURE — 3008F BODY MASS INDEX DOCD: CPT | Mod: CPTII,,, | Performed by: PAIN MEDICINE

## 2022-03-04 PROCEDURE — 3077F SYST BP >= 140 MM HG: CPT | Mod: CPTII,,, | Performed by: PAIN MEDICINE

## 2022-03-04 PROCEDURE — 99215 OFFICE O/P EST HI 40 MIN: CPT | Mod: PBBFAC | Performed by: PAIN MEDICINE

## 2022-03-04 PROCEDURE — 3080F PR MOST RECENT DIASTOLIC BLOOD PRESSURE >= 90 MM HG: ICD-10-PCS | Mod: CPTII,,, | Performed by: PAIN MEDICINE

## 2022-03-04 PROCEDURE — G0481 DRUG TEST DEF 8-14 CLASSES: HCPCS | Mod: ,,, | Performed by: CLINICAL MEDICAL LABORATORY

## 2022-03-04 PROCEDURE — 99203 PR OFFICE/OUTPT VISIT, NEW, LEVL III, 30-44 MIN: ICD-10-PCS | Mod: S$PBB,,, | Performed by: PAIN MEDICINE
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
## 2022-03-08 ENCOUNTER — TELEPHONE (OUTPATIENT)
Dept: PAIN MEDICINE | Facility: CLINIC | Age: 57
End: 2022-03-08
Payer: MEDICARE

## 2022-03-08 LAB
6-ACETYLMORPHINE, URINE (RUSH): NEGATIVE 10 NG/ML
7-AMINOCLONAZEPAM, URINE (RUSH): NEGATIVE 25 NG/ML
A-HYDROXYALPRAZOLAM, URINE (RUSH): NEGATIVE 25 NG/ML
ACETYL FENTANYL, URINE (RUSH): NEGATIVE 2.5 NG/ML
ACETYL NORFENTANYL OXALATE, URINE (RUSH): NEGATIVE 5 NG/ML
AMPHET UR QL SCN: >1000 100 NG/ML
BENZOYLECGONINE, URINE (RUSH): NEGATIVE 100 NG/ML
BUPRENORPHINE UR QL SCN: NEGATIVE 25 NG/ML
CODEINE, URINE (RUSH): NEGATIVE 25 NG/ML
CREAT UR-MCNC: 742 MG/DL (ref 39–259)
EDDP, URINE (RUSH): NEGATIVE 25 NG/ML
FENTANYL, URINE (RUSH): NEGATIVE 2.5 NG/ML
HYDROCODONE, URINE (RUSH): >250 25 NG/ML
HYDROMORPHONE, URINE (RUSH): 98.5 25 NG/ML
LORAZEPAM, URINE (RUSH): NEGATIVE 25 NG/ML
METHADONE UR QL SCN: NEGATIVE 25 NG/ML
METHAMPHET UR QL SCN: >1000 100 NG/ML
MORPHINE, URINE (RUSH): NEGATIVE 25 NG/ML
NORBUPRENORPHINE, URINE (RUSH): NEGATIVE 25 NG/ML
NORDIAZEPAM, URINE (RUSH): NEGATIVE 25 NG/ML
NORFENTANYL OXALATE, URINE (RUSH): NEGATIVE 5 NG/ML
NORHYDROCODONE, URINE (RUSH): >500 50 NG/ML
NOROXYCODONE HCL, URINE (RUSH): NEGATIVE 50 NG/ML
OXAZEPAM, URINE (RUSH): NEGATIVE 25 NG/ML
OXYCODONE UR QL SCN: NEGATIVE 25 NG/ML
OXYMORPHONE, URINE (RUSH): NEGATIVE 25 NG/ML
PH UR STRIP: 6 PH UNITS
SP GR UR STRIP: >=1.03
TAPENTADOL, URINE (RUSH): NEGATIVE 25 NG/ML
TEMAZEPAM, URINE (RUSH): NEGATIVE 25 NG/ML
THC-COOH, URINE (RUSH): >250 25 NG/ML
TRAMADOL, URINE (RUSH): NEGATIVE 100 NG/ML

## 2022-03-08 NOTE — TELEPHONE ENCOUNTER
Message sent to JOSSY Salazar to send termination letter per Dr Ramirez D/T illicit drugs on def uds.

## 2022-03-08 NOTE — TELEPHONE ENCOUNTER
----- Message from Rowena Ramirez MD sent at 3/8/2022  4:42 PM CST -----  Discharge due to illicit substances on UDS

## 2022-03-15 ENCOUNTER — CLINICAL SUPPORT (OUTPATIENT)
Dept: REHABILITATION | Facility: HOSPITAL | Age: 57
End: 2022-03-15
Attending: PAIN MEDICINE
Payer: MEDICARE

## 2022-03-15 DIAGNOSIS — M54.50 CHRONIC BILATERAL LOW BACK PAIN WITHOUT SCIATICA: ICD-10-CM

## 2022-03-15 DIAGNOSIS — G89.29 CHRONIC BILATERAL LOW BACK PAIN WITHOUT SCIATICA: ICD-10-CM

## 2022-03-15 PROCEDURE — 97162 PT EVAL MOD COMPLEX 30 MIN: CPT
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
## 2022-03-29 ENCOUNTER — CLINICAL SUPPORT (OUTPATIENT)
Dept: REHABILITATION | Facility: HOSPITAL | Age: 57
End: 2022-03-29
Payer: MEDICARE

## 2022-03-29 DIAGNOSIS — M54.50 CHRONIC BILATERAL LOW BACK PAIN WITHOUT SCIATICA: Primary | ICD-10-CM

## 2022-03-29 DIAGNOSIS — G89.29 CHRONIC BILATERAL LOW BACK PAIN WITHOUT SCIATICA: Primary | ICD-10-CM

## 2022-03-29 PROCEDURE — 97110 THERAPEUTIC EXERCISES: CPT

## 2022-03-29 NOTE — PROGRESS NOTES
Physical Therapy Treatment Note     Name: Hola Payton [facility] Number: [id number]    Therapy Diagnosis: No diagnosis found.  Physician: Rowena Ramirez MD    Visit Date: 3/29/2022  Physician Orders: PT Eval and Treat   Medical Diagnosis from Referral: M54.50,G89.29 (ICD-10-CM) - Chronic bilateral low back pain without sciatica  Evaluation Date: 3/15/2022  Authorization Period Expiration: 2022 Medicaid approval expires  Plan of Care Expiration: 2022  Progress Note Due: 4/15/2022  Visit # / Visits authorized:   FOTO: 50/60    Time In: 1300  Time Out: 1345  Total Billable Time: 40 minutes    Precautions: Standard    Subjective     Pt reports: I'm not hurting that much.    Response to previous treatment: no complaints  Functional change: none    Pain: 10  Location: bilateral back      Objective     Hola received therapeutic exercises to develop strength, endurance, ROM, flexibility, posture and core stabilization for 40 minutes including:  ltr x 10  Piriformis stretch 5 x 30 sh  Long sitting 1 x 30sh  Double knee to chest 5 x 30 sh  Pelvic tilt 10 x 10 sh  Abdominal crunch level 2 x 20  Seated flexion with silver ball:  Left, center, right    Hola received the following manual therapy techniques: Joint mobilizations, Manual traction, Myofacial release, Soft tissue Mobilization and Friction Massage were applied to the: 0 for 0 minutes, includin    Hola participated in neuromuscular re-education activities to improve: Balance, Coordination, Kinesthetic, Sense, Proprioception and Posture for 0 minutes. The following activities were included:  0    Hola participated in dynamic functional therapeutic activities to improve functional performance for 0  minutes, includin    Hola participated in gait training to improve functional mobility and safety for 0  minutes, includin    Hola received the following direct contact modalities after being cleared for  contraindications: 0    Hola received the following supervised modalities after being cleared for contraindications: 0    Hola received hot pack for 0 minutes to 0.    Hola received cold pack for 0 minutes to 0.      Home Exercises Provided and Patient Education Provided     Education provided:   - HEP    Written Home Exercises Provided: yes.  Exercises were reviewed and Hola was able to demonstrate them prior to the end of the session.  Hola demonstrated fair  understanding of the education provided.     See EMR under Patient Instructions for exercises provided prior visit.    Assessment      Pt prognosis is Fair.     Pt will continue to benefit from skilled outpatient physical therapy to address the deficits listed in the problem list box on initial evaluation, provide pt/family education and to maximize pt's level of independence in the home and community environment.     Pt's spiritual, cultural and educational needs considered and pt agreeable to plan of care and goals.     Anticipated barriers to physical therapy: chronic pain    Goals:  Short Term Goals: 4 weeks   1.  Decrease pain worst to 7/10  2.  Tolerate 45 minutes of exercise with <7/10 pain in lumbar spine.     Long Term Goals: 8 weeks   1.  Decrease pain worst to 5/10  2.  Tolerate 60 minutes of exercise/activity with <5/10 pain in back  3.  Generate a FOTO score of >60    Plan                                                                                                     2x/wk x 8 wks  CPT code CPT name Units/visit units/wk Units/treatment period   23766  Gt train 1 2 16   45016 Therex 3 6 48   77857 NMR 1 2 16   46105 US 1 2 16   22188 Traction 1 2 16   04432 IFC 1 2 16   75727 Manual 2 2 32        Continue with current plan of care.    OPAL LEIVA, PT

## 2022-04-01 DIAGNOSIS — M54.16 LUMBAR RADICULOPATHY: Primary | ICD-10-CM

## 2022-04-04 ENCOUNTER — CLINICAL SUPPORT (OUTPATIENT)
Dept: REHABILITATION | Facility: HOSPITAL | Age: 57
End: 2022-04-04
Attending: PAIN MEDICINE
Payer: MEDICARE

## 2022-04-04 ENCOUNTER — OFFICE VISIT (OUTPATIENT)
Dept: SPINE | Facility: CLINIC | Age: 57
End: 2022-04-04
Payer: MEDICARE

## 2022-04-04 ENCOUNTER — HOSPITAL ENCOUNTER (OUTPATIENT)
Dept: RADIOLOGY | Facility: HOSPITAL | Age: 57
Discharge: HOME | End: 2022-04-04
Attending: ORTHOPAEDIC SURGERY
Payer: COMMERCIAL

## 2022-04-04 DIAGNOSIS — M54.9 BACK PAIN, UNSPECIFIED BACK LOCATION, UNSPECIFIED BACK PAIN LATERALITY, UNSPECIFIED CHRONICITY: ICD-10-CM

## 2022-04-04 DIAGNOSIS — G89.29 CHRONIC BILATERAL LOW BACK PAIN WITHOUT SCIATICA: Primary | ICD-10-CM

## 2022-04-04 DIAGNOSIS — M54.16 LUMBAR RADICULOPATHY: ICD-10-CM

## 2022-04-04 DIAGNOSIS — M54.50 CHRONIC BILATERAL LOW BACK PAIN WITHOUT SCIATICA: Primary | ICD-10-CM

## 2022-04-04 PROCEDURE — 72110 XR LUMBAR SPINE 4-5 VIEW WITH BENDING VIEWS: ICD-10-PCS | Mod: 26,,, | Performed by: ORTHOPAEDIC SURGERY

## 2022-04-04 PROCEDURE — 97110 THERAPEUTIC EXERCISES: CPT

## 2022-04-04 PROCEDURE — 99204 PR OFFICE/OUTPT VISIT, NEW, LEVL IV, 45-59 MIN: ICD-10-PCS | Mod: 25,S$PBB,, | Performed by: ORTHOPAEDIC SURGERY

## 2022-04-04 PROCEDURE — 72110 X-RAY EXAM L-2 SPINE 4/>VWS: CPT | Mod: TC

## 2022-04-04 PROCEDURE — 72110 X-RAY EXAM L-2 SPINE 4/>VWS: CPT | Mod: 26,,, | Performed by: ORTHOPAEDIC SURGERY

## 2022-04-04 PROCEDURE — 99204 OFFICE O/P NEW MOD 45 MIN: CPT | Mod: 25,S$PBB,, | Performed by: ORTHOPAEDIC SURGERY

## 2022-04-04 PROCEDURE — 3044F HG A1C LEVEL LT 7.0%: CPT | Mod: CPTII,,, | Performed by: ORTHOPAEDIC SURGERY

## 2022-04-04 PROCEDURE — 99406 PR TOBACCO USE CESSATION INTERMEDIATE 3-10 MINUTES: ICD-10-PCS | Mod: S$PBB,,, | Performed by: ORTHOPAEDIC SURGERY

## 2022-04-04 PROCEDURE — 99213 OFFICE O/P EST LOW 20 MIN: CPT | Mod: PBBFAC | Performed by: ORTHOPAEDIC SURGERY

## 2022-04-04 PROCEDURE — 3044F PR MOST RECENT HEMOGLOBIN A1C LEVEL <7.0%: ICD-10-PCS | Mod: CPTII,,, | Performed by: ORTHOPAEDIC SURGERY

## 2022-04-04 PROCEDURE — 99406 BEHAV CHNG SMOKING 3-10 MIN: CPT | Mod: S$PBB,,, | Performed by: ORTHOPAEDIC SURGERY

## 2022-04-04 PROCEDURE — 97014 ELECTRIC STIMULATION THERAPY: CPT

## 2022-04-04 RX ORDER — GABAPENTIN 300 MG/1
300 CAPSULE ORAL 3 TIMES DAILY
Qty: 90 CAPSULE | Refills: 11 | Status: SHIPPED | OUTPATIENT
Start: 2022-04-04 | End: 2022-08-31

## 2022-04-04 NOTE — PROGRESS NOTES
MDM/time:  Greater than 45 minutes spent on this encounter including 15 minutes reviewing imaging and notes, 20 minutes with the patient, 10 minutes documentation    ASSESSMENT:  57 y.o. male with lumbar scoliosis lumbar spondylosis with radiculopathy    PLAN:  physical therapy lumbar spine  Neurontin 300 mg 1 tablet 3 times a day  Nicotine dependence discussed smoking cessation  Follow-up in 3 months    HPI:  57 y.o. male here for evaluation of low back pain that radiates into bilateral hips.  Patient reports years of back pain was working as a  and fell and hurt his back.  He reports increased pain over time more constant dull pain.  Patient denies difficulty with  strength.  Denies difficulty with balance no recent falls.  Denies bladder or bowel incontinence.  Difficulty with walking distance, must stop and rest.  Currently taking no medications for pain.  Is currently in physical therapy.  No recent MRI.  No prior spine surgery.  Patient currently smokes 1 pack of cigarettes per day.    IMAGIN2022 lumbar spine x-ray reviewed showed:  On the AP there is lumbar curvature to the left.  There are 5 non-rib-bearing lumbar vertebrae.  On the lateral there is decreased lumbar lordosis.  There is spondylotic disease with decreased disc height and osteophyte formation noted.  No fractures or listhesis noted.  No instability on flexion-extension views.        Past Medical History:   Diagnosis Date    Asthma     Bilateral primary osteoarthritis of hip     Bipolar disorder     hyperlipidemia     Hypertension      History reviewed. No pertinent surgical history.  Social History     Tobacco Use    Smoking status: Current Every Day Smoker     Packs/day: 1.00     Years: 40.00     Pack years: 40.00     Types: Cigarettes    Smokeless tobacco: Never Used   Substance Use Topics    Alcohol use: Not Currently    Drug use: Not Currently     Types: Marijuana      Current Outpatient Medications    Medication Instructions    cephALEXin (KEFLEX) 500 mg, Oral, 3 times daily    HYDROcodone-acetaminophen (NORCO) 7.5-325 mg per tablet 1 tablet, Oral, 2 times daily PRN    naproxen (NAPROSYN) 500 mg, Oral, 2 times daily    sildenafiL (VIAGRA) 50 mg, Oral, Daily PRN    tamsulosin (FLOMAX) 0.4 mg, Oral, Daily    VRAYLAR 3 mg, Oral, Daily    zaleplon (SONATA) 5 mg, Oral, Nightly PRN        EXAM:  Constitutional  General Appearance:  There is no height or weight on file to calculate BMI., NAD  Psychiatric   Orientation: Oriented to time, oriented to place, oriented to person  Mood and Affect: Active and alert, normal mood, normal affect  Gait and Station   Appearance:  Normal gait, normal tandem gait, able to walk on toes, able to walk on heels    LUMBAR  Musculoskeletal System   Hips: Normal appearance, no leg length discrepancy, normal motion; left, normal motion; right    Lumbar Spine                   Inspection:  Normal alignment, normal sagittal balance                  Range of motion:  Decreased flexion, extension, lateral bending, rotation. Pain with range of motion                  Bony Palpation of the Lumbar Spine:  No tenderness of the spinous process, no tenderness of the sacrum, no tenderness of the coccyx                  Bony Palpation of the Right Hip:  No tenderness of the iliac crest, no tenderness of the sciatic notch, no tenderness of the SI joint                  Bony Palpation of the Left Hip:  No tenderness of the iliac crest, no tenderness of the sciatic notch, no tenderness of the SI joint                  Soft Tissue Palpation on the Right:  No tenderness of the paraspinal region, no tenderness of the iliolumbar region                  Soft Tissue Palpation on the Left:  No tenderness of the paraspinal region, no tenderness of the iliolumbar region    Motor Strength   L1 Right:  Hip flexion iliopsoas 5/5    L1 Left:  Hip flexion iliopsoas 5/5              L2-L4 Right:  Knee extension  quadriceps 5/5, tibialis anterior 5/5              L2-L4 Left:  Knee extension quadriceps 5/5, tibialis anterior 5/5   L5 Right:  Extensor hallucis llongus 5/5,    L5 Left:  Extensor hallucis longus 5/5,    S1 Right:  Plantar flexion gastrocnemius 5/5   S1 Left:  Plantar flexion gastrocnemius 5/5    Neurological System   Ankle Reflex Right:  normal   Ankle Reflex Left: normal   Knee Reflex Right:  normal   Knee Reflex Left:  normal   Sensation on the Right:  L2 normal, L3 normal, L4 normal, L5 normal, S1 normal   Sensation on the Left:  L2 normal, L3 normal, L4 normal, L5 normal, S1 normal              Special Test on the Right:  Seated straight leg raising test negative, no clonus of the ankle              Special Test on the Left:  Seated straight leg raising test negative, no clonus of the ankle    Skin   Lumbosacral Spine:  Normal skin    Cardiovascular System   Arterial Pulses Right:  Posterior tibialis normal, dorsalis pedis normal   Arterial Pulses Left:  Posterior tibialis normal, dorsalis pedis normal   Edema Right: None   Edema Left:  None

## 2022-04-04 NOTE — PROGRESS NOTES
Physical Therapy Treatment Note     Name: Hola Payton [facility] Number: [id number]    Therapy Diagnosis:   Encounter Diagnosis   Name Primary?    Chronic bilateral low back pain without sciatica Yes     Physician: Rowena Ramirez MD    Visit Date: 2022  Physician Orders: PT Eval and Treat   Medical Diagnosis from Referral: M54.50,G89.29 (ICD-10-CM) - Chronic bilateral low back pain without sciatica  Evaluation Date: 3/15/2022  Authorization Period Expiration: 2022 Medicaid approval expires  Plan of Care Expiration: 2022  Progress Note Due: 4/15/2022  Visit # / Visits authorized: 3/17  FOTO: 50/60    Time In: 1515  Time Out: 1600  Total Billable Time: 45 minutes    Precautions: Standard    Subjective     Pt reports: Jennifer mowed my yard and digging up dirt    Response to previous treatment: no complaints  Functional change: none    Pain: 8/10  Location: bilateral back      Objective     Hola received therapeutic exercises to develop strength, endurance, ROM, flexibility, posture and core stabilization for 30 minutes including:  ltr x 10  Piriformis stretch 5 x 30 sh  Double knee to chest 5 x 30 sh  Pelvic tilt 10 x 10 sh  Abdominal crunch level 2 x 20  Hamstring stretch 4 x 30 sh  Sciatic nerve glide x 5    Hola received the following manual therapy techniques: Joint mobilizations, Manual traction, Myofacial release, Soft tissue Mobilization and Friction Massage were applied to the: 0 for 0 minutes, includin    Hola participated in neuromuscular re-education activities to improve: Balance, Coordination, Kinesthetic, Sense, Proprioception and Posture for 0 minutes. The following activities were included:  0    Hola participated in dynamic functional therapeutic activities to improve functional performance for 0  minutes, includin    Hola participated in gait training to improve functional mobility and safety for 0  minutes, includin    Hola received the following  direct contact modalities after being cleared for contraindications: 0    Hola received the following supervised modalities after being cleared for contraindications: INTERFERENTIAL x 7 volts x15 minutes to lumbar spine.  Patient tolerated /s any adverse affects.    Hola received hot pack for 0 minutes to 0.    Hola received cold pack for 0 minutes to 0.      Home Exercises Provided and Patient Education Provided     Education provided:   - HEP    Written Home Exercises Provided: yes.  Exercises were reviewed and Hola was able to demonstrate them prior to the end of the session.  Hola demonstrated fair  understanding of the education provided.     See EMR under Patient Instructions for exercises provided prior visit.    Assessment      Pt prognosis is Fair.     Pt will continue to benefit from skilled outpatient physical therapy to address the deficits listed in the problem list box on initial evaluation, provide pt/family education and to maximize pt's level of independence in the home and community environment.     Pt's spiritual, cultural and educational needs considered and pt agreeable to plan of care and goals.     Anticipated barriers to physical therapy: chronic pain    Goals:  Short Term Goals: 4 weeks   1.  Decrease pain worst to 7/10  2.  Tolerate 45 minutes of exercise with <7/10 pain in lumbar spine.     Long Term Goals: 8 weeks   1.  Decrease pain worst to 5/10  2.  Tolerate 60 minutes of exercise/activity with <5/10 pain in back  3.  Generate a FOTO score of >60    Plan                 2 therex, 1 IFC   [id number], 1x97014                                                                                   2x/wk x 8 wks  CPT code CPT name Units/visit units/wk Units/treatment period   34435  Gt train 1 2 16   96683 Therex 3 6 48   93845 NMR 1 2 16   68784 US 1 2 16   09177 Traction 1 2 16   37591 IFC 1 2 16   62868 Manual 2 2 32        Continue with current plan of care.    OPAL LEIVA,  PT

## 2022-04-04 NOTE — PROGRESS NOTES
Smoking Cessation counseling    Time spent:  3-10 minutes (51315)    We discussed the importance, over both the short and long-term, of smoking cessation; reviewed the patient's willingness to quit; overall history and current use of tobacco smoking products; that there are a variety of methods to help with smoking diminution and cessation (stopping alone, using nicotine substitution medications/gums, Chantix, other medications, etcetera, which the patient will also discuss with his or her primary care physician); that the patient should set a date for smoking cessation; and the patient will follow up with his or her primary care physician for further support and oversight.    We also discussed that for the patient to have surgery while using nicotine, wound healing may be compromised relative to those who do not smoke; that recovery from anesthesia may sometimes be more difficult; and that quitting may decrease the heart, vascular, pulmonary effects in the short term as well as over the long term.  Smoking cessation may be useful and important for any patient who will have a fusion as part of surgery or have bone or tissue that has regrown heal (bone fusions, wound closures, etc.)  since surgical results with respect to wound healing, susceptibility to recovery from infection, bone fusion, and tissue and blood vessel health may be impaired or less optimal in smokers than nonsmokers.  We talked about the elevated risk of surgical bone fusion failure in the setting of smoking and the potential need for a higher-risk revision surgery should fusion not occur.    I reviewed this with the patient in detail and all questions were answered.  We discussed nature of the patient's condition; real alternatives and realistic options; pros and cons, risks and benefits of all the options, in detail.  I believe the patient understands the above and wishes to proceed as outlined.

## 2022-04-04 NOTE — PROGRESS NOTES
AP, lateral, flexion/extension views of the lumbar spine reviewed    On the AP there is lumbar curvature to the left.  There are 5 non-rib-bearing lumbar vertebrae.  On the lateral there is decreased lumbar lordosis.  There is spondylotic disease with decreased disc height and osteophyte formation noted.  No fractures or listhesis noted.  No instability on flexion-extension views.    Impression:  Spondylotic changes of the lumbar spine as noted above

## 2022-04-06 ENCOUNTER — CLINICAL SUPPORT (OUTPATIENT)
Dept: REHABILITATION | Facility: HOSPITAL | Age: 57
End: 2022-04-06
Attending: PAIN MEDICINE
Payer: MEDICARE

## 2022-04-06 ENCOUNTER — OFFICE VISIT (OUTPATIENT)
Dept: FAMILY MEDICINE | Facility: CLINIC | Age: 57
End: 2022-04-06
Payer: COMMERCIAL

## 2022-04-06 ENCOUNTER — APPOINTMENT (OUTPATIENT)
Dept: RADIOLOGY | Facility: CLINIC | Age: 57
End: 2022-04-06
Attending: INTERNAL MEDICINE
Payer: MEDICARE

## 2022-04-06 VITALS
HEIGHT: 76 IN | SYSTOLIC BLOOD PRESSURE: 138 MMHG | RESPIRATION RATE: 20 BRPM | OXYGEN SATURATION: 96 % | BODY MASS INDEX: 31.13 KG/M2 | WEIGHT: 255.63 LBS | DIASTOLIC BLOOD PRESSURE: 91 MMHG | TEMPERATURE: 98 F | HEART RATE: 89 BPM

## 2022-04-06 DIAGNOSIS — Z13.1 SCREENING FOR DIABETES MELLITUS (DM): ICD-10-CM

## 2022-04-06 DIAGNOSIS — G89.29 CHRONIC BILATERAL LOW BACK PAIN WITHOUT SCIATICA: Primary | ICD-10-CM

## 2022-04-06 DIAGNOSIS — N40.0 BENIGN PROSTATIC HYPERPLASIA, UNSPECIFIED WHETHER LOWER URINARY TRACT SYMPTOMS PRESENT: Primary | ICD-10-CM

## 2022-04-06 DIAGNOSIS — M54.50 CHRONIC BILATERAL LOW BACK PAIN WITHOUT SCIATICA: Primary | ICD-10-CM
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
## 2022-04-06 DIAGNOSIS — S09.90XA INJURY OF HEAD, INITIAL ENCOUNTER: ICD-10-CM

## 2022-04-06 DIAGNOSIS — M54.50 CHRONIC BILATERAL LOW BACK PAIN WITHOUT SCIATICA: ICD-10-CM

## 2022-04-06 DIAGNOSIS — M41.9 SCOLIOSIS, UNSPECIFIED SCOLIOSIS TYPE, UNSPECIFIED SPINAL REGION: ICD-10-CM

## 2022-04-06 DIAGNOSIS — G89.29 CHRONIC BILATERAL LOW BACK PAIN WITHOUT SCIATICA: ICD-10-CM

## 2022-04-06 LAB
ANION GAP SERPL CALCULATED.3IONS-SCNC: 5 MMOL/L (ref 7–16)
BUN SERPL-MCNC: 7 MG/DL (ref 7–18)
BUN/CREAT SERPL: 7 (ref 6–20)
CALCIUM SERPL-MCNC: 8.9 MG/DL (ref 8.5–10.1)
CHLORIDE SERPL-SCNC: 109 MMOL/L (ref 98–107)
CO2 SERPL-SCNC: 29 MMOL/L (ref 21–32)
CREAT SERPL-MCNC: 0.99 MG/DL (ref 0.7–1.3)
EST. AVERAGE GLUCOSE BLD GHB EST-MCNC: 90 MG/DL
GLUCOSE SERPL-MCNC: 94 MG/DL (ref 74–106)
HBA1C MFR BLD HPLC: 5.3 % (ref 4.5–6.6)
POTASSIUM SERPL-SCNC: 3.8 MMOL/L (ref 3.5–5.1)
SODIUM SERPL-SCNC: 139 MMOL/L (ref 136–145)

## 2022-04-06 PROCEDURE — 80048 BASIC METABOLIC PNL TOTAL CA: CPT | Mod: ,,, | Performed by: CLINICAL MEDICAL LABORATORY

## 2022-04-06 PROCEDURE — 3080F PR MOST RECENT DIASTOLIC BLOOD PRESSURE >= 90 MM HG: ICD-10-PCS | Mod: CPTII,,, | Performed by: INTERNAL MEDICINE

## 2022-04-06 PROCEDURE — 80048 BASIC METABOLIC PANEL: ICD-10-PCS | Mod: ,,, | Performed by: CLINICAL MEDICAL LABORATORY

## 2022-04-06 PROCEDURE — 83036 HEMOGLOBIN A1C: ICD-10-PCS | Mod: ,,, | Performed by: CLINICAL MEDICAL LABORATORY

## 2022-04-06 PROCEDURE — 1159F MED LIST DOCD IN RCRD: CPT | Mod: CPTII,,, | Performed by: INTERNAL MEDICINE

## 2022-04-06 PROCEDURE — 3075F PR MOST RECENT SYSTOLIC BLOOD PRESS GE 130-139MM HG: ICD-10-PCS | Mod: CPTII,,, | Performed by: INTERNAL MEDICINE

## 2022-04-06 PROCEDURE — 99214 PR OFFICE/OUTPT VISIT, EST, LEVL IV, 30-39 MIN: ICD-10-PCS | Mod: ,,, | Performed by: INTERNAL MEDICINE

## 2022-04-06 PROCEDURE — 1159F PR MEDICATION LIST DOCUMENTED IN MEDICAL RECORD: ICD-10-PCS | Mod: CPTII,,, | Performed by: INTERNAL MEDICINE

## 2022-04-06 PROCEDURE — 1160F PR REVIEW ALL MEDS BY PRESCRIBER/CLIN PHARMACIST DOCUMENTED: ICD-10-PCS | Mod: CPTII,,, | Performed by: INTERNAL MEDICINE

## 2022-04-06 PROCEDURE — 3008F PR BODY MASS INDEX (BMI) DOCUMENTED: ICD-10-PCS | Mod: CPTII,,, | Performed by: INTERNAL MEDICINE

## 2022-04-06 PROCEDURE — 3044F HG A1C LEVEL LT 7.0%: CPT | Mod: CPTII,,, | Performed by: INTERNAL MEDICINE

## 2022-04-06 PROCEDURE — 3044F PR MOST RECENT HEMOGLOBIN A1C LEVEL <7.0%: ICD-10-PCS | Mod: CPTII,,, | Performed by: INTERNAL MEDICINE

## 2022-04-06 PROCEDURE — 70260 X-RAY EXAM OF SKULL: CPT | Mod: TC,RHCUB | Performed by: INTERNAL MEDICINE

## 2022-04-06 PROCEDURE — 97110 THERAPEUTIC EXERCISES: CPT

## 2022-04-06 PROCEDURE — 83036 HEMOGLOBIN GLYCOSYLATED A1C: CPT | Mod: ,,, | Performed by: CLINICAL MEDICAL LABORATORY

## 2022-04-06 PROCEDURE — 97140 MANUAL THERAPY 1/> REGIONS: CPT

## 2022-04-06 PROCEDURE — 3075F SYST BP GE 130 - 139MM HG: CPT | Mod: CPTII,,, | Performed by: INTERNAL MEDICINE

## 2022-04-06 PROCEDURE — 3008F BODY MASS INDEX DOCD: CPT | Mod: CPTII,,, | Performed by: INTERNAL MEDICINE

## 2022-04-06 PROCEDURE — 1160F RVW MEDS BY RX/DR IN RCRD: CPT | Mod: CPTII,,, | Performed by: INTERNAL MEDICINE

## 2022-04-06 PROCEDURE — 99214 OFFICE O/P EST MOD 30 MIN: CPT | Mod: ,,, | Performed by: INTERNAL MEDICINE

## 2022-04-06 PROCEDURE — 3080F DIAST BP >= 90 MM HG: CPT | Mod: CPTII,,, | Performed by: INTERNAL MEDICINE

## 2022-04-06 RX ORDER — CYCLOBENZAPRINE HCL 10 MG
10 TABLET ORAL NIGHTLY
Qty: 20 TABLET | Refills: 0 | Status: SHIPPED | OUTPATIENT
Start: 2022-04-06 | End: 2022-04-26

## 2022-04-06 RX ORDER — TAMSULOSIN HYDROCHLORIDE 0.4 MG/1
0.4 CAPSULE ORAL DAILY
Qty: 90 CAPSULE | Refills: 1 | Status: SHIPPED | OUTPATIENT
Start: 2022-04-06 | End: 2022-04-20 | Stop reason: SDUPTHER

## 2022-04-06 RX ORDER — NAPROXEN 500 MG/1
500 TABLET ORAL 2 TIMES DAILY PRN
Qty: 20 TABLET | Refills: 0 | Status: SHIPPED | OUTPATIENT
Start: 2022-04-06 | End: 2022-08-31 | Stop reason: ALTCHOICE

## 2022-04-06 NOTE — PROGRESS NOTES
Physical Therapy Treatment Note     Name: Hola Payton [facility] Number: [id number]    Therapy Diagnosis:   Encounter Diagnosis   Name Primary?    Chronic bilateral low back pain without sciatica Yes     Physician: Rowena Ramirez MD    Visit Date: 4/6/2022  Physician Orders: PT Eval and Treat   Medical Diagnosis from Referral: M54.50,G89.29 (ICD-10-CM) - Chronic bilateral low back pain without sciatica  Evaluation Date: 3/15/2022  Authorization Period Expiration: 5/27/2022 Medicaid approval expires  Plan of Care Expiration: 5/13/2022  Progress Note Due: 4/15/2022  Visit # / Visits authorized: 4/17  FOTO: 50/60    Time In: 0705  Time Out: 0745  Total Billable Time: 45 minutes    Precautions: Standard    Subjective     Pt reports: Im feeling better than I did the other day    Response to previous treatment: no complaints  Functional change: none    Pain: 4/10  Location: bilateral back      Objective     Hola received therapeutic exercises to develop strength, endurance, ROM, flexibility, posture and core stabilization for 30 minutes including:  ltr x 10  Piriformis stretch 5 x 30 sh  Double knee to chest 5 x 30 sh  Pelvic tilt 10 x 10 sh  Abdominal crunch level 2 x 20  Hamstring stretch 4 x 30 sh  Sciatic nerve glide x 5  Left sidelying rotation [id number]  Seated flexion with silver ball:  Left 5 x 10sh, right 5x10sh  cybex hip abduction #3 x 30 daniel  cybex hip / #4 x 30 daniel    Hola received the following manual therapy techniques: Joint mobilizations, Manual traction, Myofacial release, Soft tissue Mobilization and Friction Massage were applied to the: 0 for 0 minutes, including:  Prone press up x 10  Left deepa axis distraction x 10    Hola participated in neuromuscular re-education activities to improve: Balance, Coordination, Kinesthetic, Sense, Proprioception and Posture for 0 minutes. The following activities were included:  0    Hola participated in dynamic functional therapeutic activities to  improve functional performance for 0  minutes, includin    Hola participated in gait training to improve functional mobility and safety for 0  minutes, includin    Hola received the following direct contact modalities after being cleared for contraindications: 0    Hola received the following supervised modalities after being cleared for contraindications: INTERFERENTIAL x 7 volts x 0 minutes to lumbar spine.  Patient tolerated /s any adverse affects.    Hola received hot pack for 0 minutes to 0.    Hola received cold pack for 0 minutes to 0.      Home Exercises Provided and Patient Education Provided     Education provided:   - HEP    Written Home Exercises Provided: yes.  Exercises were reviewed and Hola was able to demonstrate them prior to the end of the session.  Hola demonstrated fair  understanding of the education provided.     See EMR under Patient Instructions for exercises provided prior visit.    Assessment   Patient tolerated /s increased complaint.  Patient reported decreased pain on left SI joint /p mobilizations.    Pt prognosis is Fair.     Pt will continue to benefit from skilled outpatient physical therapy to address the deficits listed in the problem list box on initial evaluation, provide pt/family education and to maximize pt's level of independence in the home and community environment.     Pt's spiritual, cultural and educational needs considered and pt agreeable to plan of care and goals.     Anticipated barriers to physical therapy: chronic pain    Goals:  Short Term Goals: 4 weeks   1.  Decrease pain worst to 7/10  2.  Tolerate 45 minutes of exercise with <7/10 pain in lumbar spine.     Long Term Goals: 8 weeks   1.  Decrease pain worst to 5/10  2.  Tolerate 60 minutes of exercise/activity with <5/10 pain in back  3.  Generate a FOTO score of >60    Plan                                                                                       2x/wk x 8 wks  CPT  code CPT name Units/visit units/wk Units/treatment period   26275  Gt train 1 2 16   80111 Therex 3 6 48   00542 NMR 1 2 16   30297 US 1 2 16   30935 Traction 1 2 16   12218 IFC 1 2 16   84072 Manual 2 2 32        Continue with current plan of care.    OPAL LEIVA, PT     2 therex 43845  1 manual 53139

## 2022-04-07 NOTE — PROGRESS NOTES
Subjective:       Patient ID: Hola Burrell is a 57 y.o. male.    Chief Complaint: Results (xray) and Fall (About 2 weks ago, says his blood sugar dropped)    Patient is here today for a follow up evaluation. Patient pressure is increased today on intake. Patient is concerned of possible low blood sugar. States he passed out at a local restaurant and a  told him his blood sugar might have been low. His blood sugar was not checked at the time. Patient states he hit his head when he passed out. Will X-ray Skull Series. Will order STAT CT Brain. Patient also complains that he is having chronic lower back pain. Patient states his back pain worsens when he is constantly moving around. Will prescribe Naprosyn 500mg PO BID PRN #20 and Flexeril 10mg PO QHS for pain. Will refer to Pain Treatment. Patient states he does follow with Physical Therapy. Will follow in 2 weeks.       Current Medications:    Current Outpatient Medications:     cariprazine (VRAYLAR) 3 mg Cap, Take 3 mg by mouth once daily., Disp: , Rfl:     gabapentin (NEURONTIN) 300 MG capsule, Take 1 capsule (300 mg total) by mouth 3 (three) times daily., Disp: 90 capsule, Rfl: 11    sildenafiL (VIAGRA) 100 MG tablet, Take 0.5 tablets (50 mg total) by mouth daily as needed for Erectile Dysfunction (Take 30 minutes prior to intercourse)., Disp: 7 tablet, Rfl: 2    zaleplon (SONATA) 5 MG Cap, Take 5 mg by mouth nightly as needed., Disp: , Rfl:     cyclobenzaprine (FLEXERIL) 10 MG tablet, Take 1 tablet (10 mg total) by mouth every evening. for 20 days, Disp: 20 tablet, Rfl: 0    HYDROcodone-acetaminophen (NORCO) 7.5-325 mg per tablet, Take 1 tablet by mouth 2 (two) times daily as needed (Severe pain). (Patient not taking: Reported on 4/6/2022), Disp: 14 tablet, Rfl: 0    naproxen (NAPROSYN) 500 MG tablet, Take 1 tablet (500 mg total) by mouth 2 (two) times daily as needed (md order)., Disp: 20 tablet, Rfl: 0    tamsulosin (FLOMAX) 0.4 mg Cap,  Take 1 capsule (0.4 mg total) by mouth once daily., Disp: 90 capsule, Rfl: 1    Last Labs:     Office Visit on 04/06/2022   Component Date Value    Sodium 04/06/2022 139     Potassium 04/06/2022 3.8     Chloride 04/06/2022 109 (A)    CO2 04/06/2022 29     Anion Gap 04/06/2022 5 (A)    Glucose 04/06/2022 94     BUN 04/06/2022 7     Creatinine 04/06/2022 0.99     BUN/Creatinine Ratio 04/06/2022 7     Calcium 04/06/2022 8.9     eGFR 04/06/2022 83     Hemoglobin A1C 04/06/2022 5.3     Estimated Average Glucose 04/06/2022 90        Last Imaging:  X-Ray Skull Complete Min 4 Views  Narrative: EXAMINATION:  XR SKULL COMPLETE MIN 4 VIEWS    CLINICAL HISTORY:  .  Unspecified fall, initial encounter    COMPARISON:  No previous similar    TECHNIQUE:  Skull four views    FINDINGS:  There is no acute fracture of the calvarium.  There is no focal lytic or blastic lesion.  There are some particulate metallic density foreign bodies scattered over the soft tissues of the head, face, and neck.  Paranasal sinuses are generally clear.  Patient is edentulous  Impression: No acute bony abnormality    Metallic foreign bodies are scattered over the soft tissues of the right head, face, and neck.    Electronically signed by: Emmett Mccall  Date:    04/06/2022  Time:    16:01         Review of Systems   Musculoskeletal: Positive for back pain.   All other systems reviewed and are negative.        Objective:      Physical Exam  Constitutional:       Appearance: Normal appearance. He is normal weight.   Cardiovascular:      Rate and Rhythm: Normal rate and regular rhythm.      Pulses: Normal pulses.      Heart sounds: Normal heart sounds.   Pulmonary:      Effort: Pulmonary effort is normal.      Breath sounds: Normal breath sounds.   Abdominal:      General: Abdomen is flat. Bowel sounds are normal.      Palpations: Abdomen is soft.   Musculoskeletal:         General: Normal range of motion.      Cervical back: Normal range of  motion and neck supple.   Skin:     General: Skin is warm and dry.   Neurological:      General: No focal deficit present.      Mental Status: He is alert and oriented to person, place, and time. Mental status is at baseline.         Assessment:       1. Benign prostatic hyperplasia, unspecified whether lower urinary tract symptoms present  Basic Metabolic Panel    Basic Metabolic Panel   2. Screening for diabetes mellitus (DM)  Hemoglobin A1C    Hemoglobin A1C   3. Fall, initial encounter  X-Ray Skull Complete Min 4 Views    CT Head Without Contrast   4. Chronic bilateral low back pain without sciatica  Ambulatory referral/consult to Pain Clinic   5. Scoliosis, unspecified scoliosis type, unspecified spinal region     6. Injury of head, initial encounter          Plan:         Hola was seen today for results and fall.    Diagnoses and all orders for this visit:    Benign prostatic hyperplasia, unspecified whether lower urinary tract symptoms present  -     Basic Metabolic Panel; Future  -     Basic Metabolic Panel    Screening for diabetes mellitus (DM)  -     Hemoglobin A1C; Future  -     Hemoglobin A1C    Fall, initial encounter  -     X-Ray Skull Complete Min 4 Views; Future  -     CT Head Without Contrast; Future    Chronic bilateral low back pain without sciatica  -     Ambulatory referral/consult to Pain Clinic; Future    Scoliosis, unspecified scoliosis type, unspecified spinal region    Injury of head, initial encounter    Other orders  -     tamsulosin (FLOMAX) 0.4 mg Cap; Take 1 capsule (0.4 mg total) by mouth once daily.  -     cyclobenzaprine (FLEXERIL) 10 MG tablet; Take 1 tablet (10 mg total) by mouth every evening. for 20 days  -     naproxen (NAPROSYN) 500 MG tablet; Take 1 tablet (500 mg total) by mouth 2 (two) times daily as needed (md order).

## 2022-04-07 NOTE — PATIENT INSTRUCTIONS
Hola was seen today for results and fall.    Diagnoses and all orders for this visit:    Benign prostatic hyperplasia, unspecified whether lower urinary tract symptoms present  -     Basic Metabolic Panel; Future  -     Basic Metabolic Panel    Screening for diabetes mellitus (DM)  -     Hemoglobin A1C; Future  -     Hemoglobin A1C    Fall, initial encounter  -     X-Ray Skull Complete Min 4 Views; Future  -     CT Head Without Contrast; Future    Chronic bilateral low back pain without sciatica  -     Ambulatory referral/consult to Pain Clinic; Future    Scoliosis, unspecified scoliosis type, unspecified spinal region    Injury of head, initial encounter    Other orders  -     tamsulosin (FLOMAX) 0.4 mg Cap; Take 1 capsule (0.4 mg total) by mouth once daily.  -     cyclobenzaprine (FLEXERIL) 10 MG tablet; Take 1 tablet (10 mg total) by mouth every evening. for 20 days  -     naproxen (NAPROSYN) 500 MG tablet; Take 1 tablet (500 mg total) by mouth 2 (two) times daily as needed (md order).

## 2022-04-11 ENCOUNTER — CLINICAL SUPPORT (OUTPATIENT)
Dept: REHABILITATION | Facility: HOSPITAL | Age: 57
End: 2022-04-11
Attending: PAIN MEDICINE
Payer: MEDICARE

## 2022-04-11 DIAGNOSIS — G89.29 CHRONIC BILATERAL LOW BACK PAIN WITHOUT SCIATICA: Primary | ICD-10-CM

## 2022-04-11 DIAGNOSIS — M54.50 CHRONIC BILATERAL LOW BACK PAIN WITHOUT SCIATICA: Primary | ICD-10-CM

## 2022-04-11 PROCEDURE — 97110 THERAPEUTIC EXERCISES: CPT

## 2022-04-11 NOTE — PROGRESS NOTES
Physical Therapy Treatment Note     Name: Hola Payton [facility] Number: [id number]    Therapy Diagnosis:   No diagnosis found.  Physician: Rowena Ramirez MD    Visit Date: 2022  Physician Orders: PT Eval and Treat   Medical Diagnosis from Referral: M54.50,G89.29 (ICD-10-CM) - Chronic bilateral low back pain without sciatica  Evaluation Date: 3/15/2022  Authorization Period Expiration: 2022 Medicaid approval expires  Plan of Care Expiration: 2022  Progress Note Due: 4/15/2022  Visit # / Visits authorized:   FOTO: 50/60    Time In: 1520  Time Out: 1600  Total Billable Time: 40 minutes    Precautions: Standard    Subjective     Pt reports: Im feeling better than I did the other day    Response to previous treatment: no complaints  Functional change: none    Pain: 4/10  Location: lower lumbar    Objective     Hola received therapeutic exercises to develop strength, endurance, ROM, flexibility, posture and core stabilization for 30 minutes including:  ltr x 10  Piriformis stretch 5 x 30 sh  Double knee to chest 5 x 30 sh  Pelvic tilt 10 x 10 sh  Abdominal crunch level 2 x 20  Hamstring stretch 4 x 30 sh  Sciatic nerve glide x 5  Left sidelying rotation [id number]  Seated flexion with silver ball:  Left 5 x 10sh, right 5x10sh  cybex hip abduction #3 x 30 daniel  cybex hip / #4 x 30 daniel    Hola received the following manual therapy techniques: Joint mobilizations, Manual traction, Myofacial release, Soft tissue Mobilization and Friction Massage were applied to the: 0 for 0 minutes, including:  Prone press up x 10  Left long axis distraction x 10    Hola participated in neuromuscular re-education activities to improve: Balance, Coordination, Kinesthetic, Sense, Proprioception and Posture for 0 minutes. The following activities were included:  0    Hola participated in dynamic functional therapeutic activities to improve functional performance for 0  minutes, includin    Hola  participated in gait training to improve functional mobility and safety for 0  minutes, includin    Hola received the following direct contact modalities after being cleared for contraindications: 0    Hola received the following supervised modalities after being cleared for contraindications: INTERFERENTIAL x 7 volts x 0 minutes to lumbar spine.  Patient tolerated /s any adverse affects.    Hola received hot pack for 0 minutes to 0.    Hola received cold pack for 0 minutes to 0.      Home Exercises Provided and Patient Education Provided     Education provided:   - HEP    Written Home Exercises Provided: yes.  Exercises were reviewed and Hola was able to demonstrate them prior to the end of the session.  Hola demonstrated fair  understanding of the education provided.     See EMR under Patient Instructions for exercises provided prior visit.    Assessment   Patient tolerated /s increased complaint.  Patient reported decreased pain on left SI joint /p mobilizations.    Pt prognosis is Fair.     Pt will continue to benefit from skilled outpatient physical therapy to address the deficits listed in the problem list box on initial evaluation, provide pt/family education and to maximize pt's level of independence in the home and community environment.     Pt's spiritual, cultural and educational needs considered and pt agreeable to plan of care and goals.     Anticipated barriers to physical therapy: chronic pain    Goals:  Short Term Goals: 4 weeks   1.  Decrease pain worst to 7/10  2.  Tolerate 45 minutes of exercise with <7/10 pain in lumbar spine.     Long Term Goals: 8 weeks   1.  Decrease pain worst to 5/10  2.  Tolerate 60 minutes of exercise/activity with <5/10 pain in back  3.  Generate a FOTO score of >60    Plan                                                                                       2x/wk x 8 wks  CPT code CPT name Units/visit units/wk Units/treatment period   28709  Gt  train 1 2 16   42158 Therex 3 6 48   38494 NMR 1 2 16   61821 US 1 2 16   89972 Traction 1 2 16   61640 IFC 1 2 16   27745 Manual 2 2 32        Continue with current plan of care.    OPAL LEIVA, PT     2 therex 73671  1 manual 62632

## 2022-04-13 ENCOUNTER — CLINICAL SUPPORT (OUTPATIENT)
Dept: REHABILITATION | Facility: HOSPITAL | Age: 57
End: 2022-04-13
Attending: PAIN MEDICINE
Payer: MEDICARE

## 2022-04-13 DIAGNOSIS — M54.50 CHRONIC BILATERAL LOW BACK PAIN WITHOUT SCIATICA: Primary | ICD-10-CM

## 2022-04-13 DIAGNOSIS — G89.29 CHRONIC BILATERAL LOW BACK PAIN WITHOUT SCIATICA: Primary | ICD-10-CM

## 2022-04-13 PROCEDURE — 97110 THERAPEUTIC EXERCISES: CPT
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
## 2022-04-19 ENCOUNTER — CLINICAL SUPPORT (OUTPATIENT)
Dept: REHABILITATION | Facility: HOSPITAL | Age: 57
End: 2022-04-19
Attending: PAIN MEDICINE
Payer: MEDICARE

## 2022-04-19 DIAGNOSIS — M54.50 LUMBAR PAIN: ICD-10-CM

## 2022-04-19 PROCEDURE — 97140 MANUAL THERAPY 1/> REGIONS: CPT | Mod: CQ

## 2022-04-19 PROCEDURE — 97110 THERAPEUTIC EXERCISES: CPT | Mod: CQ

## 2022-04-19 NOTE — PROGRESS NOTES
Physical Therapy Treatment Note     Name: Hola Payton [facility] Number: [id number]    Therapy Diagnosis:   No diagnosis found.  Physician: Rowena Ramirez MD    Visit Date: 4/19/2022  Physician Orders: PT Eval and Treat   Medical Diagnosis from Referral: M54.50,G89.29 (ICD-10-CM) - Chronic bilateral low back pain without sciatica  Evaluation Date: 3/15/2022  Authorization Period Expiration: 5/27/2022 Medicaid approval expires  Plan of Care Expiration: 5/13/2022  Progress Note Due: 4/15/2022  Visit # / Visits authorized: 7/17  FOTO: 50/60    Time In: 1303  Time Out: 1350  Total Billable Time: 47 minutes    Precautions: Standard    Subjective     Pt reports: neck is good just LBA    Response to previous treatment: no complaints  Functional change: none    Pain: 4/10  Location: lower lumbar    Objective     Hola received therapeutic exercises to develop strength, endurance, ROM, flexibility, posture and core stabilization for 30 minutes including:  ltr x 10  Piriformis stretch 5 x 30 second hold manual   Double knee to chest 5 x 30 second hold manual  Pelvic tilt 10 x 10 second hold  Dead bug isometrics x 10 with 3 sec hold Alexander  Abdominal crunch level 2 x 20  Hamstring stretch 4 x 30 second hold manual  Sciatic nerve glide x 5 manual  Left sidelying rotation 10 x10 sh manual  Seated flexion with silver ball:  Left 5 x 10sh, right 5x10sh  cybex hip abduction #3 x 30 alexander NOT THIS VISIT   cybex hip / #4 x 30 alexander NOT THIS VISIT  Prone hip extension x 10 with 3 sec hold    Hola received the following manual therapy techniques: Joint mobilizations, Manual traction, Myofacial release, Soft tissue Mobilization and Friction Massage were applied to the: 0 for 0 minutes, including:  Prone press up x 10 with Glute set  Left long axis distraction x 10    Hola participated in neuromuscular re-education activities to improve: Balance, Coordination, Kinesthetic, Sense, Proprioception and Posture for 0 minutes. The  following activities were included:  0    Hola participated in dynamic functional therapeutic activities to improve functional performance for 0  minutes, includin    Hola participated in gait training to improve functional mobility and safety for 0  minutes, includin    Hola received the following direct contact modalities after being cleared for contraindications: 0    Hola received the following supervised modalities after being cleared for contraindications: INTERFERENTIAL x 7 volts x 0 minutes to lumbar spine.  Patient tolerated /s any adverse affects.    Hola received hot pack for 0 minutes to 0.    Hola received cold pack for 0 minutes to 0.      Home Exercises Provided and Patient Education Provided     Education provided:   - HEP    Written Home Exercises Provided: yes.  Exercises were reviewed and Hola was able to demonstrate them prior to the end of the session.  Hola demonstrated fair  understanding of the education provided.     See EMR under Patient Instructions for exercises provided prior visit.    Assessment   Case conference with Ludwin Melara PT for initial PTA visit. Patient tolerated with only c/o fatigue at end.  Patient reported decreased pain on left SI joint /p mobilizations. Increased muscular tone along Alexander SI joint area.    Pt prognosis is Fair.     Pt will continue to benefit from skilled outpatient physical therapy to address the deficits listed in the problem list box on initial evaluation, provide pt/family education and to maximize pt's level of independence in the home and community environment.     Pt's spiritual, cultural and educational needs considered and pt agreeable to plan of care and goals.     Anticipated barriers to physical therapy: chronic pain    Goals:  Short Term Goals: 4 weeks   1.  Decrease pain worst to 7/10  2.  Tolerate 45 minutes of exercise with <7/10 pain in lumbar spine.     Long Term Goals: 8 weeks   1.  Decrease pain  worst to 5/10  2.  Tolerate 60 minutes of exercise/activity with <5/10 pain in back  3.  Generate a FOTO score of >60    Plan                                                                                       2x/wk x 8 wks  CPT code CPT name Units/visit units/wk Units/treatment period   20803  Gt train 1 2 16   04703 Therex 3 6 48   89067 NMR 1 2 16   41784 US 1 2 16   15563 Traction 1 2 16   87753 IFC 1 2 16   55347 Manual 2 2 32        Continue with current plan of care.    Tamanna Rao, PTA     2 therex 17332  1 manual 38145

## 2022-04-20 ENCOUNTER — OFFICE VISIT (OUTPATIENT)
Dept: FAMILY MEDICINE | Facility: CLINIC | Age: 57
End: 2022-04-20
Payer: MEDICARE

## 2022-04-20 VITALS
SYSTOLIC BLOOD PRESSURE: 146 MMHG | DIASTOLIC BLOOD PRESSURE: 108 MMHG | HEIGHT: 76 IN | RESPIRATION RATE: 20 BRPM | WEIGHT: 249 LBS | TEMPERATURE: 98 F | HEART RATE: 77 BPM | BODY MASS INDEX: 30.32 KG/M2 | OXYGEN SATURATION: 98 %

## 2022-04-20 DIAGNOSIS — R55 SYNCOPE, UNSPECIFIED SYNCOPE TYPE: ICD-10-CM

## 2022-04-20 DIAGNOSIS — I10 HYPERTENSION, UNSPECIFIED TYPE: Primary | ICD-10-CM

## 2022-04-20 LAB
EKG 12-LEAD: NORMAL
PR INTERVAL: NORMAL
PRT AXES: NORMAL
QRS DURATION: NORMAL
QT/QTC: NORMAL
TROPONIN I SERPL HS-MCNC: 5.9 PG/ML
VENTRICULAR RATE: NORMAL

## 2022-04-20 PROCEDURE — 93005 POCT EKG 12-LEAD: ICD-10-PCS | Mod: ,,, | Performed by: INTERNAL MEDICINE

## 2022-04-20 PROCEDURE — 84484 ASSAY OF TROPONIN QUANT: CPT | Mod: ,,, | Performed by: CLINICAL MEDICAL LABORATORY

## 2022-04-20 PROCEDURE — 3080F DIAST BP >= 90 MM HG: CPT | Mod: ,,, | Performed by: INTERNAL MEDICINE

## 2022-04-20 PROCEDURE — 3077F SYST BP >= 140 MM HG: CPT | Mod: ,,, | Performed by: INTERNAL MEDICINE

## 2022-04-20 PROCEDURE — 3008F PR BODY MASS INDEX (BMI) DOCUMENTED: ICD-10-PCS | Mod: ,,, | Performed by: INTERNAL MEDICINE

## 2022-04-20 PROCEDURE — 99214 OFFICE O/P EST MOD 30 MIN: CPT | Mod: ,,, | Performed by: INTERNAL MEDICINE

## 2022-04-20 PROCEDURE — 1160F RVW MEDS BY RX/DR IN RCRD: CPT | Mod: ,,, | Performed by: INTERNAL MEDICINE

## 2022-04-20 PROCEDURE — 93005 ELECTROCARDIOGRAM TRACING: CPT | Mod: ,,, | Performed by: INTERNAL MEDICINE

## 2022-04-20 PROCEDURE — 1159F MED LIST DOCD IN RCRD: CPT | Mod: ,,, | Performed by: INTERNAL MEDICINE

## 2022-04-20 PROCEDURE — 3044F PR MOST RECENT HEMOGLOBIN A1C LEVEL <7.0%: ICD-10-PCS | Mod: ,,, | Performed by: INTERNAL MEDICINE

## 2022-04-20 PROCEDURE — 3008F BODY MASS INDEX DOCD: CPT | Mod: ,,, | Performed by: INTERNAL MEDICINE

## 2022-04-20 PROCEDURE — 3080F PR MOST RECENT DIASTOLIC BLOOD PRESSURE >= 90 MM HG: ICD-10-PCS | Mod: ,,, | Performed by: INTERNAL MEDICINE

## 2022-04-20 PROCEDURE — 3077F PR MOST RECENT SYSTOLIC BLOOD PRESSURE >= 140 MM HG: ICD-10-PCS | Mod: ,,, | Performed by: INTERNAL MEDICINE

## 2022-04-20 PROCEDURE — 1159F PR MEDICATION LIST DOCUMENTED IN MEDICAL RECORD: ICD-10-PCS | Mod: ,,, | Performed by: INTERNAL MEDICINE

## 2022-04-20 PROCEDURE — 3044F HG A1C LEVEL LT 7.0%: CPT | Mod: ,,, | Performed by: INTERNAL MEDICINE

## 2022-04-20 PROCEDURE — 99214 PR OFFICE/OUTPT VISIT, EST, LEVL IV, 30-39 MIN: ICD-10-PCS | Mod: ,,, | Performed by: INTERNAL MEDICINE

## 2022-04-20 PROCEDURE — 1160F PR REVIEW ALL MEDS BY PRESCRIBER/CLIN PHARMACIST DOCUMENTED: ICD-10-PCS | Mod: ,,, | Performed by: INTERNAL MEDICINE

## 2022-04-20 PROCEDURE — 84484 TROPONIN I: ICD-10-PCS | Mod: ,,, | Performed by: CLINICAL MEDICAL LABORATORY

## 2022-04-20 RX ORDER — FLUTICASONE PROPIONATE 50 MCG
1 SPRAY, SUSPENSION (ML) NASAL DAILY
COMMUNITY
End: 2022-04-20 | Stop reason: SDUPTHER

## 2022-04-20 NOTE — PATIENT INSTRUCTIONS
Hola was seen today for results and hypertension.    Diagnoses and all orders for this visit:    Hypertension, unspecified type  -     Ambulatory referral/consult to Cardiology; Future    Syncope, unspecified syncope type  -     Troponin I; Future  -     Echo; Future  -     POCT EKG 12-LEAD (NOT FOR [facility] USE)  -     CT Head Without Contrast; Future  -     Radiology US Carotid Bilateral; Future  -     Ambulatory referral/consult to Cardiology; Future  -     Troponin I    Other orders  The following orders have not been finalized:  -     fluticasone propionate (FLONASE) 50 mcg/actuation nasal spray  -     tamsulosin (FLOMAX) 0.4 mg Cap

## 2022-04-20 NOTE — PROGRESS NOTES
Subjective:       Patient ID: Hola Burrell is a 57 y.o. male.    Chief Complaint: Results (Xray and lab/) and Hypertension    Patient is here today for a follow up evaluation. Patient was concerned about glucose levels during previous visit. Recent labs reviewed, glucose levels normal. Will order Troponin. Recent X-ray skull reviewed, results abnormal. On previous visit, patient states he had a syncope episode and hit his head. Will refer to Cardiology. Will order CT Brain. Will order US Carotid. Will order ECHO. Will order EKG. Advised patient if he ever feels dizzy or weak to eat peppermint. Will follow in 1 month.       Current Medications:    Current Outpatient Medications:     cariprazine (VRAYLAR) 3 mg Cap, Take 3 mg by mouth once daily., Disp: , Rfl:     fluticasone propionate (FLONASE) 50 mcg/actuation nasal spray, 1 spray by Each Nostril route once daily., Disp: , Rfl:     gabapentin (NEURONTIN) 300 MG capsule, Take 1 capsule (300 mg total) by mouth 3 (three) times daily., Disp: 90 capsule, Rfl: 11    sildenafiL (VIAGRA) 100 MG tablet, Take 0.5 tablets (50 mg total) by mouth daily as needed for Erectile Dysfunction (Take 30 minutes prior to intercourse)., Disp: 7 tablet, Rfl: 2    tamsulosin (FLOMAX) 0.4 mg Cap, Take 1 capsule (0.4 mg total) by mouth once daily., Disp: 90 capsule, Rfl: 1    zaleplon (SONATA) 5 MG Cap, Take 5 mg by mouth nightly as needed., Disp: , Rfl:     cyclobenzaprine (FLEXERIL) 10 MG tablet, Take 1 tablet (10 mg total) by mouth every evening. for 20 days (Patient not taking: Reported on 4/20/2022), Disp: 20 tablet, Rfl: 0    HYDROcodone-acetaminophen (NORCO) 7.5-325 mg per tablet, Take 1 tablet by mouth 2 (two) times daily as needed (Severe pain)., Disp: 14 tablet, Rfl: 0    naproxen (NAPROSYN) 500 MG tablet, Take 1 tablet (500 mg total) by mouth 2 (two) times daily as needed (md order). (Patient not taking: Reported on 4/20/2022), Disp: 20 tablet, Rfl: 0    Last  Labs:     Office Visit on 04/06/2022   Component Date Value    Sodium 04/06/2022 139     Potassium 04/06/2022 3.8     Chloride 04/06/2022 109 (A)    CO2 04/06/2022 29     Anion Gap 04/06/2022 5 (A)    Glucose 04/06/2022 94     BUN 04/06/2022 7     Creatinine 04/06/2022 0.99     BUN/Creatinine Ratio 04/06/2022 7     Calcium 04/06/2022 8.9     eGFR 04/06/2022 83     Hemoglobin A1C 04/06/2022 5.3     Estimated Average Glucose 04/06/2022 90        Last Imaging:  X-Ray Skull Complete Min 4 Views  Narrative: EXAMINATION:  XR SKULL COMPLETE MIN 4 VIEWS    CLINICAL HISTORY:  .  Unspecified fall, initial encounter    COMPARISON:  No previous similar    TECHNIQUE:  Skull four views    FINDINGS:  There is no acute fracture of the calvarium.  There is no focal lytic or blastic lesion.  There are some particulate metallic density foreign bodies scattered over the soft tissues of the head, face, and neck.  Paranasal sinuses are generally clear.  Patient is edentulous  Impression: No acute bony abnormality    Metallic foreign bodies are scattered over the soft tissues of the right head, face, and neck.    Electronically signed by: Emmett Mccall  Date:    04/06/2022  Time:    16:01         Review of Systems   All other systems reviewed and are negative.        Objective:      Physical Exam  Constitutional:       Appearance: Normal appearance. He is normal weight.   Cardiovascular:      Rate and Rhythm: Normal rate and regular rhythm.      Pulses: Normal pulses.      Heart sounds: Normal heart sounds.   Pulmonary:      Effort: Pulmonary effort is normal.      Breath sounds: Normal breath sounds.   Abdominal:      General: Abdomen is flat. Bowel sounds are normal.      Palpations: Abdomen is soft.   Musculoskeletal:         General: Normal range of motion.      Cervical back: Normal range of motion and neck supple.   Skin:     General: Skin is warm and dry.   Neurological:      General: No focal deficit present.       Mental Status: He is alert and oriented to person, place, and time. Mental status is at baseline.         Assessment:       1. Hypertension, unspecified type  Ambulatory referral/consult to Cardiology   2. Syncope, unspecified syncope type  Troponin I    Echo    POCT EKG 12-LEAD (NOT FOR [facility] USE)    CT Head Without Contrast    Radiology US Carotid Bilateral    Ambulatory referral/consult to Cardiology    Troponin I        Plan:         Hola was seen today for results and hypertension.    Diagnoses and all orders for this visit:    Hypertension, unspecified type  -     Ambulatory referral/consult to Cardiology; Future    Syncope, unspecified syncope type  -     Troponin I; Future  -     Echo; Future  -     POCT EKG 12-LEAD (NOT FOR [facility] USE)  -     CT Head Without Contrast; Future  -     Radiology US Carotid Bilateral; Future  -     Ambulatory referral/consult to Cardiology; Future  -     Troponin I    Other orders  The following orders have not been finalized:  -     fluticasone propionate (FLONASE) 50 mcg/actuation nasal spray  -     tamsulosin (FLOMAX) 0.4 mg Cap

## 2022-04-21 ENCOUNTER — CLINICAL SUPPORT (OUTPATIENT)
Dept: REHABILITATION | Facility: HOSPITAL | Age: 57
End: 2022-04-21
Attending: PAIN MEDICINE
Payer: MEDICARE

## 2022-04-21 DIAGNOSIS — M54.50 LUMBAR PAIN: ICD-10-CM

## 2022-04-21 PROCEDURE — 97110 THERAPEUTIC EXERCISES: CPT | Mod: CQ

## 2022-04-21 PROCEDURE — 97112 NEUROMUSCULAR REEDUCATION: CPT | Mod: CQ

## 2022-04-21 RX ORDER — FLUTICASONE PROPIONATE 50 MCG
1 SPRAY, SUSPENSION (ML) NASAL DAILY
Qty: 16 G | Refills: 2 | Status: SHIPPED | OUTPATIENT
Start: 2022-04-21 | End: 2022-08-31 | Stop reason: SDUPTHER

## 2022-04-21 RX ORDER — TAMSULOSIN HYDROCHLORIDE 0.4 MG/1
0.4 CAPSULE ORAL DAILY
Qty: 90 CAPSULE | Refills: 1 | Status: SHIPPED | OUTPATIENT
Start: 2022-04-21 | End: 2022-12-29 | Stop reason: SDUPTHER

## 2022-04-21 NOTE — PROGRESS NOTES
Physical Therapy Treatment Note     Name: Hola Payton [facility] Number: [id number]    Therapy Diagnosis:   No diagnosis found.  Physician: Rowena Ramirez MD    Visit Date: 4/21/2022  Physician Orders: PT Eval and Treat   Medical Diagnosis from Referral: M54.50,G89.29 (ICD-10-CM) - Chronic bilateral low back pain without sciatica  Evaluation Date: 3/15/2022  Authorization Period Expiration: 5/27/2022 Medicaid approval expires  Plan of Care Expiration: 5/13/2022  Progress Note Due: 4/15/2022  Visit # / Visits authorized: 8/ 17  FOTO: 50/60    Time In: 1300  Time Out: 1345  Total Billable Time: 45 minutes    Precautions: Standard    Subjective     Pt reports: lumbar pain, but improving    Response to previous treatment: no complaints  Functional change: none    Pain: 4/10  Location: lower lumbar    Objective     Hola received therapeutic exercises to develop strength, endurance, ROM, flexibility, posture and core stabilization for 30 minutes including:  Nustep x 5 min  Incline board x 1 min  ltr x 10 with 10 sec hold with overpressure  Piriformis stretch 3 x 30 second hold manual   Double knee to chest 5 x 30 second hold manual  Pelvic tilt 10 x 10 second hold  Dead bug isometrics x 10 with 3 sec hold Alexander  Abdominal crunch level 2 x 20  Hamstring stretch 2 x 60 second hold manual  Sciatic nerve glide x 5 manual  horzontial ABDUCTION UE in prone x 10 with 3 sec hold  Left sidelying rotation 10 x10 sh manual  Seated flexion with silver ball:  Left 5 x 10sh, right 5x10sh  cybex hip abduction #3 x 30 alexander NOT THIS VISIT   cybex hip / #4 x 30 alexander NOT THIS VISIT  Prone hip extension x 10 with 3 sec hold  Quad rocking x 8 with 5 sec hold    Hola received the following manual therapy techniques: Joint mobilizations, Manual traction, Myofacial release, Soft tissue Mobilization and Friction Massage were applied to the: 0 for 0 minutes, including:  Prone press up x 10 with Glute set  Left long axis distraction x  10    Hola participated in neuromuscular re-education activities to improve: Balance, Coordination, Kinesthetic, Sense, Proprioception and Posture for 0 minutes. The following activities were included:  0    Hola participated in dynamic functional therapeutic activities to improve functional performance for 0  minutes, includin    Hola participated in gait training to improve functional mobility and safety for 0  minutes, includin    Hola received the following direct contact modalities after being cleared for contraindications: 0    Hola received the following supervised modalities after being cleared for contraindications: INTERFERENTIAL x 7 volts x 0 minutes to lumbar spine.  Patient tolerated /s any adverse affects.    Hola received hot pack for 0 minutes to 0.    Hola received cold pack for 0 minutes to 0.      Home Exercises Provided and Patient Education Provided     Education provided:   - HEP    Written Home Exercises Provided: yes.  Exercises were reviewed and Hola was able to demonstrate them prior to the end of the session.  Hola demonstrated fair  understanding of the education provided.     See EMR under Patient Instructions for exercises provided prior visit.    Assessment    Patient tolerated with only c/o fatigue at end.  Appears more restricted on right side with quad rocking.    Pt prognosis is Fair.     Pt will continue to benefit from skilled outpatient physical therapy to address the deficits listed in the problem list box on initial evaluation, provide pt/family education and to maximize pt's level of independence in the home and community environment.     Pt's spiritual, cultural and educational needs considered and pt agreeable to plan of care and goals.     Anticipated barriers to physical therapy: chronic pain    Goals:  Short Term Goals: 4 weeks   1.  Decrease pain worst to 7/10  2.  Tolerate 45 minutes of exercise with <7/10 pain in lumbar spine.      Long Term Goals: 8 weeks   1.  Decrease pain worst to 5/10  2.  Tolerate 60 minutes of exercise/activity with <5/10 pain in back  3.  Generate a FOTO score of >60    Plan                                                                                       2x/wk x 8 wks  CPT code CPT name Units/visit units/wk Units/treatment period   77955  Gt train 1 2 16   53947 Therex 3 6 48   03131 NMR 1 2 16   47730 US 1 2 16   42852 Traction 1 2 16   37153 IFC 1 2 16   17680 Manual 2 2 32        Continue with current plan of care.    Tamanna Rao, PTA     2 therex 37251  1 manual 80889

## 2022-04-25 ENCOUNTER — CLINICAL SUPPORT (OUTPATIENT)
Dept: REHABILITATION | Facility: HOSPITAL | Age: 57
End: 2022-04-25
Attending: PAIN MEDICINE
Payer: MEDICARE

## 2022-04-25 DIAGNOSIS — M54.16 LUMBAR RADICULOPATHY: ICD-10-CM

## 2022-04-25 PROCEDURE — 97110 THERAPEUTIC EXERCISES: CPT | Mod: CQ

## 2022-04-27 ENCOUNTER — CLINICAL SUPPORT (OUTPATIENT)
Dept: REHABILITATION | Facility: HOSPITAL | Age: 57
End: 2022-04-27
Attending: PAIN MEDICINE
Payer: MEDICARE

## 2022-04-27 DIAGNOSIS — M54.16 LUMBAR RADICULOPATHY: Primary | ICD-10-CM

## 2022-04-27 PROCEDURE — 97110 THERAPEUTIC EXERCISES: CPT

## 2022-04-27 NOTE — PROGRESS NOTES
Physical Therapy Treatment Note     Name: Hola Payton [facility] Number: [id number]    Therapy Diagnosis:   No diagnosis found.  Physician: Rowena Ramirez MD    Visit Date: 4/27/2022  Physician Orders: PT Eval and Treat   Medical Diagnosis from Referral: M54.50,G89.29 (ICD-10-CM) - Chronic bilateral low back pain without sciatica  Evaluation Date: 3/15/2022  Authorization Period Expiration: 5/27/2022 Medicaid approval expires  Plan of Care Expiration: 5/13/2022  Last Progress Note written 4/13/2022  Visit # / Visits authorized: 10/17  FOTO: 50/60    Time In: 1305  Time Out: 1345  Total Billable Time: 40 minutes    Precautions: Standard    Subjective     Pt reports: continuing to improve, just came from pysch visit and a little frustrated    Response to previous treatment: haven't really had much pain  Functional change: none    Pain: 3/10  Location: lower lumbar    Objective     Hola received therapeutic exercises to develop strength, endurance, ROM, flexibility, posture and core stabilization for 40 minutes including:  bike x 5 min  Incline board x 1 min  ltr x 10 with 10 sec hold with overpressure  Piriformis stretch 3 x 30 second hold  Double knee to chest 5 x 30 second hold  Pelvic tilt 10 x 10 second hold  Bridges with blue band x 20   Dead bug reaches with blue band   Abdominal crunch level 2 x 20 NOT THIS VISIT   Hamstring stretch 2 x 60 second hold manual  Sciatic nerve glide x 5 seated  Left sidelying rotation 10 x10 sh manual over ball  Seated flexion with silver ball:  Left 5 x 10sh, right 5x10sh  cybex hip abduction #3 x 30 daniel NOT THIS VISIT   Standing hip abduction  Green x 25  Standing hip / green x 25  Standing hip flexion green x 25  Standing hip adduction green x 25    Hola received the following manual therapy techniques: Joint mobilizations, Manual traction, Myofacial release, Soft tissue Mobilization and Friction Massage were applied to the: 0 for 0 minutes, including:  Prone press  up x 10 with Glute set  Left long axis distraction x 10    Hola participated in neuromuscular re-education activities to improve: Balance, Coordination, Kinesthetic, Sense, Proprioception and Posture for 0 minutes. The following activities were included:  0    Hola participated in dynamic functional therapeutic activities to improve functional performance for 0  minutes, includin    Hola participated in gait training to improve functional mobility and safety for 0  minutes, includin    Hola received the following direct contact modalities after being cleared for contraindications: 0    Hola received the following supervised modalities after being cleared for contraindications: INTERFERENTIAL x 7 volts x 0 minutes to lumbar spine.  Patient tolerated /s any adverse affects.    Hola received hot pack for 0 minutes to 0.    Hola received cold pack for 0 minutes to 0.      Home Exercises Provided and Patient Education Provided     Education provided:   - HEP    Written Home Exercises Provided: yes.  Exercises were reviewed and Hola was able to demonstrate them prior to the end of the session.  Hola demonstrated fair  understanding of the education provided.     See EMR under Patient Instructions for exercises provided prior visit.    Assessment   Patient required frequent rest breaks with standing therex with theraband.  SOB noted.    Pt prognosis is Fair.     Pt will continue to benefit from skilled outpatient physical therapy to address the deficits listed in the problem list box on initial evaluation, provide pt/family education and to maximize pt's level of independence in the home and community environment.     Pt's spiritual, cultural and educational needs considered and pt agreeable to plan of care and goals.     Anticipated barriers to physical therapy: chronic pain    Goals:  Short Term Goals: 4 weeks   1.  Decrease pain worst to 7/10.  Goal Met   2.  Tolerate 45 minutes of  exercise with <7/10 pain in lumbar spine. Goal Met     Long Term Goals: 8 weeks   1.  Decrease pain worst to 5/10  2.  Tolerate 60 minutes of exercise/activity with <5/10 pain in back  3.  Generate a FOTO score of >60    Plan                                                                                       2x/wk x 8 wks  CPT code CPT name Units/visit units/wk Units/treatment period   04125  Gt train 1 2 16   07386 Therex 3 6 48   17198 NMR 1 2 16   05091 US 1 2 16   30308 Traction 1 2 16   02815 IFC 1 2 16   61186 Manual 2 2 32        Continue with current plan of care.    OPAL LEIVA, PT     3 therex [id number]

## 2022-05-02 ENCOUNTER — CLINICAL SUPPORT (OUTPATIENT)
Dept: REHABILITATION | Facility: HOSPITAL | Age: 57
End: 2022-05-02
Attending: PAIN MEDICINE
Payer: MEDICARE

## 2022-05-02 DIAGNOSIS — G89.29 CHRONIC BILATERAL LOW BACK PAIN WITHOUT SCIATICA: ICD-10-CM

## 2022-05-02 DIAGNOSIS — M54.50 CHRONIC BILATERAL LOW BACK PAIN WITHOUT SCIATICA: ICD-10-CM

## 2022-05-02 PROCEDURE — 97110 THERAPEUTIC EXERCISES: CPT | Mod: CQ

## 2022-05-02 NOTE — PROGRESS NOTES
Physical Therapy Treatment Note     Name: Hola Payton [facility] Number: [id number]    Therapy Diagnosis:   No diagnosis found.  Physician: Rowena Ramirez MD    Visit Date: 5/2/2022  Physician Orders: PT Eval and Treat   Medical Diagnosis from Referral: M54.50,G89.29 (ICD-10-CM) - Chronic bilateral low back pain without sciatica  Evaluation Date: 3/15/2022  Authorization Period Expiration: 5/27/2022 Medicaid approval expires  Plan of Care Expiration: 5/13/2022  Last Progress Note written 4/13/2022  Visit # / Visits authorized: 10/17  FOTO: 50/60    Time In: 1305  Time Out: 1345  Total Billable Time: 40 minutes    Precautions: Standard    Subjective     Pt reports: depressed today, has to go get his medicines from [place]    Response to previous treatment: haven't really had much pain  Functional change: none    Pain: 3/10  Location: lower lumbar    Objective     Hola received therapeutic exercises to develop strength, endurance, ROM, flexibility, posture and core stabilization for 40 minutes including:  nustep x 5 min  Incline board x 1 min  ltr x 10 with 10 sec hold with overpressure  Piriformis stretch 3 x 30 second hold  Double knee to chest 5 x 30 second hold NOT THIS VISIT   Pelvic tilt 10 x 10 second hold  Bridges with LAQ x 20   Dead bug reaches with blue band   Abdominal crunch level 2 x 20 NOT THIS VISIT   Hamstring stretch 2 x 60 second hold   Sciatic nerve glide x 5 seated  Left sidelying rotation 10 x10 sh manual over ball  Seated flexion with silver ball:  Left 5 x 10sh, right 5x10sh  cybex hip abduction #3 x 30 daniel    Standing hip abduction  Green x 25 NOT THIS VISIT   Standing hip / green x 25 NOT THIS VISIT   Standing hip flexion green x 25 NOT THIS VISIT   Standing hip adduction green x 25 NOT THIS VISIT  Prone hip exension x 20   Quad rocking x 10 with 10 sec hold  cybex rows #4 x 15 each   cybes exention #3 x 30    Hola received the following manual therapy techniques: Joint  mobilizations, Manual traction, Myofacial release, Soft tissue Mobilization and Friction Massage were applied to the: 0 for 0 minutes, including:  Prone press up x 10 with Glute set  Left long axis distraction x 10    Hola participated in neuromuscular re-education activities to improve: Balance, Coordination, Kinesthetic, Sense, Proprioception and Posture for 0 minutes. The following activities were included:  0    Hola participated in dynamic functional therapeutic activities to improve functional performance for 0  minutes, includin    Hola participated in gait training to improve functional mobility and safety for 0  minutes, includin    Hola received the following direct contact modalities after being cleared for contraindications: 0    Hola received the following supervised modalities after being cleared for contraindications: INTERFERENTIAL x 7 volts x 0 minutes to lumbar spine.  Patient tolerated /s any adverse affects.    Hola received hot pack for 0 minutes to 0.    Hola received cold pack for 0 minutes to 0.      Home Exercises Provided and Patient Education Provided     Education provided:   - HEP    Written Home Exercises Provided: yes.  Exercises were reviewed and Hola was able to demonstrate them prior to the end of the session.  Hola demonstrated fair  understanding of the education provided.     See EMR under Patient Instructions for exercises provided prior visit.    Assessment   Patient required frequent rest breaks with standing therex with theraband.  SOB noted.    Pt prognosis is Fair.     Pt will continue to benefit from skilled outpatient physical therapy to address the deficits listed in the problem list box on initial evaluation, provide pt/family education and to maximize pt's level of independence in the home and community environment.     Pt's spiritual, cultural and educational needs considered and pt agreeable to plan of care and goals.      Anticipated barriers to physical therapy: chronic pain    Goals:  Short Term Goals: 4 weeks   1.  Decrease pain worst to 7/10.  Goal Met   2.  Tolerate 45 minutes of exercise with <7/10 pain in lumbar spine. Goal Met     Long Term Goals: 8 weeks   1.  Decrease pain worst to 5/10  2.  Tolerate 60 minutes of exercise/activity with <5/10 pain in back  3.  Generate a FOTO score of >60    Plan                                                                                       2x/wk x 8 wks  CPT code CPT name Units/visit units/wk Units/treatment period   63046  Gt train 1 2 16   37402 Therex 3 6 48   61880 NMR 1 2 16   74308 US 1 2 16   71556 Traction 1 2 16   29663 IFC 1 2 16   38082 Manual 2 2 32        Continue with current plan of care.    Tamanna Rao, PTA     3 therex [id number]

## 2022-05-04 ENCOUNTER — CLINICAL SUPPORT (OUTPATIENT)
Dept: REHABILITATION | Facility: HOSPITAL | Age: 57
End: 2022-05-04
Attending: PAIN MEDICINE
Payer: MEDICARE

## 2022-05-04 DIAGNOSIS — M54.50 CHRONIC BILATERAL LOW BACK PAIN WITHOUT SCIATICA: ICD-10-CM

## 2022-05-04 DIAGNOSIS — G89.29 CHRONIC BILATERAL LOW BACK PAIN WITHOUT SCIATICA: ICD-10-CM

## 2022-05-04 PROCEDURE — 97110 THERAPEUTIC EXERCISES: CPT | Mod: CQ

## 2022-05-04 PROCEDURE — 97140 MANUAL THERAPY 1/> REGIONS: CPT | Mod: CQ

## 2022-05-04 NOTE — PROGRESS NOTES
Physical Therapy Treatment Note     Name: Hola Payton [facility] Number: [id number]    Therapy Diagnosis:   No diagnosis found.  Physician: Rowena Ramirez MD    Visit Date: 5/4/2022  Physician Orders: PT Eval and Treat   Medical Diagnosis from Referral: M54.50,G89.29 (ICD-10-CM) - Chronic bilateral low back pain without sciatica  Evaluation Date: 3/15/2022  Authorization Period Expiration: 5/27/2022 Medicaid approval expires  Plan of Care Expiration: 5/13/2022  Last Progress Note written 4/13/2022  Visit # / Visits authorized: 10/17  FOTO: 50/60    Time In: 1301  Time Out: 1345  Total Billable Time: 44 minutes    Precautions: Standard    Subjective     Pt reports: back is hurting from changing a tire    Response to previous treatment: haven't really had much pain  Functional change: none    Pain: 3/10  Location: lower lumbar    Objective     Hola received therapeutic exercises to develop strength, endurance, ROM, flexibility, posture and core stabilization for 40 minutes including:  bike x 5 min  Incline board x 1 min   Pelvic tilt 10 x 10 second hold  Bridges with LAQ x 20   Dead bug reaches with blue band NOT THIS VISIT   Abdominal crunch level 2 x 20 NOT THIS VISIT   Left sidelying rotation 10 x10 sh manual over ball NOT THIS VISIT   Seated flexion with silver ball:  Left 5 x 10sh, right 5x10sh  cybex hip abduction #3 x 30 daniel  NOT THIS VISIT   Standing hip abduction  Green x 25 NOT THIS VISIT   Standing hip / green x 25 NOT THIS VISIT   Standing hip flexion green x 25 NOT THIS VISIT   Standing hip adduction green x 25 NOT THIS VISIT  Prone hip exension x 20   Quad rocking x 10 with 10 sec hold  cybex rows #4 x 15 each   cybex lumbar exention #4 x 20  Standing lat pull downs #3 x 20  Forward pulldowns on cable #5 x 20    Hola received the following manual therapy techniques: Joint mobilizations, Manual traction, Myofacial release, Soft tissue Mobilization and Friction Massage were applied to  the: 0 for 0 minutes, including:  Ha sting, SKTC and piriformis manual stretching, nerve glides x 8 min    Hola participated in neuromuscular re-education activities to improve: Balance, Coordination, Kinesthetic, Sense, Proprioception and Posture for 0 minutes. The following activities were included:  0    Hola participated in dynamic functional therapeutic activities to improve functional performance for 0  minutes, includin    Hola participated in gait training to improve functional mobility and safety for 0  minutes, includin    Hola received the following direct contact modalities after being cleared for contraindications: 0    Hola received the following supervised modalities after being cleared for contraindications: INTERFERENTIAL x 7 volts x 0 minutes to lumbar spine.  Patient tolerated /s any adverse affects.    Hola received hot pack for 0 minutes to 0.    Hola received cold pack for 0 minutes to 0.      Home Exercises Provided and Patient Education Provided     Education provided:   - HEP    Written Home Exercises Provided: yes.  Exercises were reviewed and Hola was able to demonstrate them prior to the end of the session.  Hola demonstrated fair  understanding of the education provided.     See EMR under Patient Instructions for exercises provided prior visit.    Assessment   Patient did better with ex today and able to proceed without rest breaks.    Pt prognosis is Fair.     Pt will continue to benefit from skilled outpatient physical therapy to address the deficits listed in the problem list box on initial evaluation, provide pt/family education and to maximize pt's level of independence in the home and community environment.     Pt's spiritual, cultural and educational needs considered and pt agreeable to plan of care and goals.     Anticipated barriers to physical therapy: chronic pain    Goals:  Short Term Goals: 4 weeks   1.  Decrease pain worst to 7/10.  Goal  Met   2.  Tolerate 45 minutes of exercise with <7/10 pain in lumbar spine. Goal Met     Long Term Goals: 8 weeks   1.  Decrease pain worst to 5/10  2.  Tolerate 60 minutes of exercise/activity with <5/10 pain in back  3.  Generate a FOTO score of >60    Plan                                                                                       2x/wk x 8 wks  CPT code CPT name Units/visit units/wk Units/treatment period   02153  Gt train 1 2 16   66050 Therex 3 6 48   99504 NMR 1 2 16   77406 US 1 2 16   03270 Traction 1 2 16   69877 IFC 1 2 16   68388 Manual 2 2 32        Continue with current plan of care.    Tamanna Rao, PTA     3 therex [id number]

## 2022-05-09 ENCOUNTER — CLINICAL SUPPORT (OUTPATIENT)
Dept: REHABILITATION | Facility: HOSPITAL | Age: 57
End: 2022-05-09
Attending: PAIN MEDICINE
Payer: MEDICARE

## 2022-05-09 DIAGNOSIS — M54.50 CHRONIC BILATERAL LOW BACK PAIN WITHOUT SCIATICA: ICD-10-CM

## 2022-05-09 DIAGNOSIS — G89.29 CHRONIC BILATERAL LOW BACK PAIN WITHOUT SCIATICA: ICD-10-CM

## 2022-05-09 PROCEDURE — 97110 THERAPEUTIC EXERCISES: CPT | Mod: CQ

## 2022-05-09 NOTE — PROGRESS NOTES
Physical Therapy Treatment Note     Name: Hola Payton [facility] Number: [id number]    Therapy Diagnosis:   No diagnosis found.  Physician: Rowena Ramirez MD    Visit Date: 5/9/2022  Physician Orders: PT Eval and Treat   Medical Diagnosis from Referral: M54.50,G89.29 (ICD-10-CM) - Chronic bilateral low back pain without sciatica  Evaluation Date: 3/15/2022  Authorization Period Expiration: 5/27/2022 Medicaid approval expires  Plan of Care Expiration: 5/13/2022  Last Progress Note written 4/13/2022  Visit # / Visits authorized: 10/17  FOTO: 50/60    Time In: 1303  Time Out: 1345  Total Billable Time: 42  minutes    Precautions: Standard    Subjective     Pt reports: Back is killing me    Response to previous treatment: haven't really had much pain  Functional change: none    Pain: 5/10  Location: lower lumbar    Objective     Hola received therapeutic exercises to develop strength, endurance, ROM, flexibility, posture and core stabilization for 40 minutes including:  bike x 5 min  Incline board x 1 min   Pelvic tilt 10 x 10 second hold  Bridges with LAQ x 20   Dead bug reaches with black  band x 15 Alexander  Abdominal crunch level 2 x 20 NOT THIS VISIT   Left sidelying rotation 10 x10 sh manual over ball NOT THIS VISIT   Seated flexion with silver ball:  Left 5 x 10sh, right 5x10sh  cybex hip abduction #3 x 30 alexander  NOT THIS VISIT   Standing hip abduction  Green x 25 NOT THIS VISIT   Standing hip / green x 25 NOT THIS VISIT   Standing hip flexion green x 25 NOT THIS VISIT   Standing hip adduction green x 25 NOT THIS VISIT  Prone hip exension x 20   Quad rocking x 10 with 10 sec hold  cybex rows #4 x 15 each   cybex lumbar exention #4 x 20  Standing lat pull downs #3 x 20  Forward pulldowns on cable #5 x 20 NOT THIS VISIT     Hola received the following manual therapy techniques: Joint mobilizations, Manual traction, Myofacial release, Soft tissue Mobilization and Friction Massage were applied to the: 0  for 0 minutes, including:  Ha sting, SKTC and piriformis manual stretching, nerve glides x 8 min    Hola participated in neuromuscular re-education activities to improve: Balance, Coordination, Kinesthetic, Sense, Proprioception and Posture for 0 minutes. The following activities were included:  0    Hola participated in dynamic functional therapeutic activities to improve functional performance for 0  minutes, includin    Hola participated in gait training to improve functional mobility and safety for 0  minutes, includin    Hola received the following direct contact modalities after being cleared for contraindications: 0    Hola received the following supervised modalities after being cleared for contraindications: INTERFERENTIAL x 7 volts x 0 minutes to lumbar spine.  Patient tolerated /s any adverse affects.    Hola received hot pack for 0 minutes to 0.    Hola received cold pack for 0 minutes to 0.      Home Exercises Provided and Patient Education Provided     Education provided:   - HEP    Written Home Exercises Provided: yes.  Exercises were reviewed and Hola was able to demonstrate them prior to the end of the session.  Hola demonstrated fair  understanding of the education provided.     See EMR under Patient Instructions for exercises provided prior visit.    Assessment   Patient with better ROM during quad rocking.    Pt prognosis is Fair.     Pt will continue to benefit from skilled outpatient physical therapy to address the deficits listed in the problem list box on initial evaluation, provide pt/family education and to maximize pt's level of independence in the home and community environment.     Pt's spiritual, cultural and educational needs considered and pt agreeable to plan of care and goals.     Anticipated barriers to physical therapy: chronic pain    Goals:  Short Term Goals: 4 weeks   1.  Decrease pain worst to 7/10.  Goal Met   2.  Tolerate 45 minutes of  exercise with <7/10 pain in lumbar spine. Goal Met     Long Term Goals: 8 weeks   1.  Decrease pain worst to 5/10  2.  Tolerate 60 minutes of exercise/activity with <5/10 pain in back  3.  Generate a FOTO score of >60    Plan                                                                                       2x/wk x 8 wks  CPT code CPT name Units/visit units/wk Units/treatment period   53852  Gt train 1 2 16   84834 Therex 3 6 48   69317 NMR 1 2 16   30317 US 1 2 16   33830 Traction 1 2 16   39915 IFC 1 2 16   08690 Manual 2 2 32        Continue with current plan of care.    Tamanna Rao, PTA     3 therex [id number]

## 2022-05-11 ENCOUNTER — CLINICAL SUPPORT (OUTPATIENT)
Dept: REHABILITATION | Facility: HOSPITAL | Age: 57
End: 2022-05-11
Attending: PAIN MEDICINE
Payer: MEDICARE

## 2022-05-11 DIAGNOSIS — M54.50 CHRONIC BILATERAL LOW BACK PAIN WITHOUT SCIATICA: ICD-10-CM

## 2022-05-11 DIAGNOSIS — G89.29 CHRONIC BILATERAL LOW BACK PAIN WITHOUT SCIATICA: ICD-10-CM

## 2022-05-11 PROCEDURE — 97110 THERAPEUTIC EXERCISES: CPT | Mod: CQ

## 2022-05-18 ENCOUNTER — CLINICAL SUPPORT (OUTPATIENT)
Dept: REHABILITATION | Facility: HOSPITAL | Age: 57
End: 2022-05-18
Attending: PAIN MEDICINE
Payer: MEDICARE

## 2022-05-18 DIAGNOSIS — M54.16 LUMBAR RADICULOPATHY: Primary | ICD-10-CM

## 2022-05-18 PROCEDURE — 97110 THERAPEUTIC EXERCISES: CPT

## 2022-05-18 NOTE — PROGRESS NOTES
Physical Therapy Treatment Note     Name: Hola Payton [facility] Number: [id number]    Therapy Diagnosis:   No diagnosis found.  Physician: Rowena Ramirez MD    Visit Date: 5/18/2022  Physician Orders: PT Eval and Treat   Medical Diagnosis from Referral: M54.50,G89.29 (ICD-10-CM) - Chronic bilateral low back pain without sciatica  Evaluation Date: 3/15/2022  Authorization Period Expiration: 5/27/2022 Medicaid approval expires  Plan of Care Expiration: 5/13/2022  Last Progress Note written 4/13/2022  Visit # / Visits authorized: 15/17  FOTO: 50/60    Time In: 1305  Time Out: 1335  Total Billable Time: 30  minutes    Precautions: Standard    Subjective     Pt reports: My back feels the same.  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    Pain: 3/10  Location: lower lumbar    Objective     Hola received therapeutic exercises to develop strength, endurance, ROM, flexibility, posture and core stabilization for 15 minutes including:  HOME EXERCISE PROGRAM review    NTV  bike x 5 min  Incline board x 1 min   HAMSTRING stretch 3 x 30 sec hold  Pelvic tilt 10 x 10 second hold NOT THIS VISIT   Bridges with LAQ x 20 NOT THIS VISIT   Dead bug reaches with black  band x 15 Alexander NOT THIS VISIT   Abdominal crunch level 2 x 20 NOT THIS VISIT   Left sidelying rotation 10 x10 sh manual over ball NOT THIS VISIT   Seated flexion with silver ball:  Left 5 x 10sh, right 5x10sh  cybex hip abduction #3 x 30 alexander  NOT THIS VISIT   Standing hip abduction  Green x 25    Standing hip / green x 25    Standing hip flexion green x 25 NOT THIS VISIT   Prone hip exension x 20 NOT THIS VISIT   Quad rocking x 10 with 10 sec hold NOT THIS VISIT   cybex rows #4 x 20 each   Sitting lat pull downs #3 x 30  Forward pulldowns on cable #5 x 30   Overhead shoulder flexion in  standing with cable 3 plates x 15     Education provided:   -reviewed exercises and stretches for home.  -FOTO score at discharge    Written Home Exercises Provided: yes.  Exercises were reviewed and Hola was able to demonstrate them prior to the end of the session.  Hola demonstrated good understanding of the education provided.     See EMR under Patient Instructions for exercises provided prior visit.    Assessment     Goals:  Short Term Goals: 4 weeks   1.  Decrease pain worst to 7/10.  Goal Met   2.  Tolerate 45 minutes of exercise with <7/10 pain in lumbar spine. Goal Met     Long Term Goals: 8 weeks   1.  Decrease pain worst to 5/10 not met  2.  Tolerate 60 minutes of exercise/activity with <5/10 pain in back.  Patient is able to perform therapeutic exercise with < 5/10 pain, but if more strenuous activity is required, patient's pain score increases.  3.  Generate a FOTO score of >60. Discharge FOTO 54% function    Plan                 Patient has reached his maximum rehab potential, and will be discharged today.  Discharge FOTO 54% function      OPAL LEIVA, PT

## 2022-05-23 ENCOUNTER — OFFICE VISIT (OUTPATIENT)
Dept: FAMILY MEDICINE | Facility: CLINIC | Age: 57
End: 2022-05-23
Payer: MEDICARE

## 2022-05-23 VITALS
WEIGHT: 255.81 LBS | BODY MASS INDEX: 31.15 KG/M2 | OXYGEN SATURATION: 97 % | TEMPERATURE: 98 F | DIASTOLIC BLOOD PRESSURE: 90 MMHG | HEART RATE: 90 BPM | SYSTOLIC BLOOD PRESSURE: 136 MMHG | RESPIRATION RATE: 20 BRPM | HEIGHT: 76 IN

## 2022-05-23 DIAGNOSIS — M54.50 ACUTE BILATERAL LOW BACK PAIN WITHOUT SCIATICA: ICD-10-CM

## 2022-05-23 DIAGNOSIS — N52.8 OTHER MALE ERECTILE DYSFUNCTION: ICD-10-CM

## 2022-05-23 DIAGNOSIS — L02.212 ABSCESS OF LOWER BACK: ICD-10-CM

## 2022-05-23 DIAGNOSIS — N40.0 BENIGN PROSTATIC HYPERPLASIA, UNSPECIFIED WHETHER LOWER URINARY TRACT SYMPTOMS PRESENT: Primary | ICD-10-CM

## 2022-05-23 PROCEDURE — 3075F PR MOST RECENT SYSTOLIC BLOOD PRESS GE 130-139MM HG: ICD-10-PCS | Mod: ,,, | Performed by: INTERNAL MEDICINE

## 2022-05-23 PROCEDURE — 3080F DIAST BP >= 90 MM HG: CPT | Mod: ,,, | Performed by: INTERNAL MEDICINE

## 2022-05-23 PROCEDURE — 3008F BODY MASS INDEX DOCD: CPT | Mod: ,,, | Performed by: INTERNAL MEDICINE

## 2022-05-23 PROCEDURE — 99214 OFFICE O/P EST MOD 30 MIN: CPT | Mod: ,,, | Performed by: INTERNAL MEDICINE

## 2022-05-23 PROCEDURE — 3044F HG A1C LEVEL LT 7.0%: CPT | Mod: ,,, | Performed by: INTERNAL MEDICINE

## 2022-05-23 PROCEDURE — 3075F SYST BP GE 130 - 139MM HG: CPT | Mod: ,,, | Performed by: INTERNAL MEDICINE

## 2022-05-23 PROCEDURE — 3044F PR MOST RECENT HEMOGLOBIN A1C LEVEL <7.0%: ICD-10-PCS | Mod: ,,, | Performed by: INTERNAL MEDICINE

## 2022-05-23 PROCEDURE — 1159F PR MEDICATION LIST DOCUMENTED IN MEDICAL RECORD: ICD-10-PCS | Mod: ,,, | Performed by: INTERNAL MEDICINE

## 2022-05-23 PROCEDURE — 1159F MED LIST DOCD IN RCRD: CPT | Mod: ,,, | Performed by: INTERNAL MEDICINE

## 2022-05-23 PROCEDURE — 99214 PR OFFICE/OUTPT VISIT, EST, LEVL IV, 30-39 MIN: ICD-10-PCS | Mod: ,,, | Performed by: INTERNAL MEDICINE

## 2022-05-23 PROCEDURE — 3008F PR BODY MASS INDEX (BMI) DOCUMENTED: ICD-10-PCS | Mod: ,,, | Performed by: INTERNAL MEDICINE

## 2022-05-23 PROCEDURE — 1160F PR REVIEW ALL MEDS BY PRESCRIBER/CLIN PHARMACIST DOCUMENTED: ICD-10-PCS | Mod: ,,, | Performed by: INTERNAL MEDICINE

## 2022-05-23 PROCEDURE — 1160F RVW MEDS BY RX/DR IN RCRD: CPT | Mod: ,,, | Performed by: INTERNAL MEDICINE

## 2022-05-23 PROCEDURE — 3080F PR MOST RECENT DIASTOLIC BLOOD PRESSURE >= 90 MM HG: ICD-10-PCS | Mod: ,,, | Performed by: INTERNAL MEDICINE

## 2022-05-23 NOTE — PROGRESS NOTES
Subjective:       Patient ID: Hola Burrell is a 57 y.o. male.    Chief Complaint: Hypertension and Follow-up    Patient is here today for a follow up evaluation. Patient pressure is increased today on intake. Patient has a complaint of boils between his buttocks. Recommended patient to sit in hot water. Will order Bactrim DS PO BID #20. Will follow in 1 week.       Current Medications:    Current Outpatient Medications:     cariprazine (VRAYLAR) 3 mg Cap, Take 3 mg by mouth once daily., Disp: , Rfl:     fluticasone propionate (FLONASE) 50 mcg/actuation nasal spray, 1 spray (50 mcg total) by Each Nostril route once daily., Disp: 16 g, Rfl: 2    gabapentin (NEURONTIN) 300 MG capsule, Take 1 capsule (300 mg total) by mouth 3 (three) times daily., Disp: 90 capsule, Rfl: 11    sildenafiL (VIAGRA) 100 MG tablet, Take 0.5 tablets (50 mg total) by mouth daily as needed for Erectile Dysfunction (Take 30 minutes prior to intercourse)., Disp: 7 tablet, Rfl: 2    tamsulosin (FLOMAX) 0.4 mg Cap, Take 1 capsule (0.4 mg total) by mouth once daily., Disp: 90 capsule, Rfl: 1    HYDROcodone-acetaminophen (NORCO) 7.5-325 mg per tablet, Take 1 tablet by mouth 2 (two) times daily as needed (Severe pain). (Patient not taking: Reported on 5/23/2022), Disp: 14 tablet, Rfl: 0    naproxen (NAPROSYN) 500 MG tablet, Take 1 tablet (500 mg total) by mouth 2 (two) times daily as needed (md order). (Patient not taking: Reported on 5/23/2022), Disp: 20 tablet, Rfl: 0    zaleplon (SONATA) 5 MG Cap, Take 5 mg by mouth nightly as needed., Disp: , Rfl:     Last Labs:     No visits with results within 1 Month(s) from this visit.   Latest known visit with results is:   Office Visit on 04/20/2022   Component Date Value    EKG 12-Lead 04/20/2022      Ventricular Rate 04/20/2022 88 bmp     VT Interval 04/20/2022 177 ms     QRS Duration 04/20/2022 92 ms     QT/QTc 04/20/2022 353/429 ms     Troponin I High Sensitiv* 04/20/2022 5.9         Last Imaging:  X-Ray Skull Complete Min 4 Views  Narrative: EXAMINATION:  XR SKULL COMPLETE MIN 4 VIEWS    CLINICAL HISTORY:  .  Unspecified fall, initial encounter    COMPARISON:  No previous similar    TECHNIQUE:  Skull four views    FINDINGS:  There is no acute fracture of the calvarium.  There is no focal lytic or blastic lesion.  There are some particulate metallic density foreign bodies scattered over the soft tissues of the head, face, and neck.  Paranasal sinuses are generally clear.  Patient is edentulous  Impression: No acute bony abnormality    Metallic foreign bodies are scattered over the soft tissues of the right head, face, and neck.    Electronically signed by: Emmett Mccall  Date:    04/06/2022  Time:    16:01         Review of Systems   Integumentary:  Positive for mole/lesion.        Abscess lower back   All other systems reviewed and are negative.        Objective:      Physical Exam  Constitutional:       Appearance: Normal appearance. He is normal weight.   Cardiovascular:      Rate and Rhythm: Normal rate and regular rhythm.      Pulses: Normal pulses.      Heart sounds: Normal heart sounds.   Pulmonary:      Effort: Pulmonary effort is normal.      Breath sounds: Normal breath sounds.   Abdominal:      General: Abdomen is flat. Bowel sounds are normal.      Palpations: Abdomen is soft.   Musculoskeletal:         General: Normal range of motion.      Cervical back: Normal range of motion and neck supple.   Skin:     General: Skin is warm and dry.   Neurological:      General: No focal deficit present.      Mental Status: He is alert and oriented to person, place, and time. Mental status is at baseline.         Assessment:       1. Benign prostatic hyperplasia, unspecified whether lower urinary tract symptoms present     2. Other male erectile dysfunction     3. Acute bilateral low back pain without sciatica     4. Abscess of lower back          Plan:         Hola was seen today for  hypertension and follow-up.    Diagnoses and all orders for this visit:    Benign prostatic hyperplasia, unspecified whether lower urinary tract symptoms present    Other male erectile dysfunction    Acute bilateral low back pain without sciatica    Abscess of lower back    Other orders  The following orders have not been finalized:  -     sulfamethoxazole-trimethoprim 800-160mg (BACTRIM DS) 800-160 mg Tab

## 2022-05-23 NOTE — PATIENT INSTRUCTIONS
Hola was seen today for hypertension and follow-up.    Diagnoses and all orders for this visit:    Benign prostatic hyperplasia, unspecified whether lower urinary tract symptoms present    Other male erectile dysfunction    Acute bilateral low back pain without sciatica    Abscess of lower back    Other orders  The following orders have not been finalized:  -     sulfamethoxazole-trimethoprim 800-160mg (BACTRIM DS) 800-160 mg Tab

## 2022-05-23 NOTE — PROGRESS NOTES
"PCP: Kali Yañez MD    Referring Provider:     Subjective:   Hola Burrell is a 57 y.o. male with hx of HTN, HLD, bipolar disorder, amphetamine use who presents for evaluation of syncope.       Patient had syncopal spell that he attributed to low blood sugar but does not have diabetes.  Was in a bar at the time.  He states he has had heart flutters in the past, lasting a few seconds.   He denies any chest pain.  He reports shortness of breath with exertion.  Denies lower extremity edema.  Blood pressure elevated.      Fhx: Negative for heart disease.   Shx: Smoker, occasional etoh, states he is around drug use.     EKG 5/24/22 -  Sinus rhythm with premature atrial complexes.            4/21/22 -  sinus rhythm, QS wave in lead V1, poor r wave progression (V2), suspect anteroseptal myocardial infarction?, inverted T wave (V1,V2)  ECHO         Lab Results   Component Value Date     04/06/2022    K 3.8 04/06/2022     (H) 04/06/2022    CO2 29 04/06/2022    BUN 7 04/06/2022    CREATININE 0.99 04/06/2022    CALCIUM 8.9 04/06/2022    ANIONGAP 5 (L) 04/06/2022    ESTGFRAFRICA 94 08/17/2021    EGFRNONAA 83 04/06/2022       Lab Results   Component Value Date    CHOL 145 08/17/2021     Lab Results   Component Value Date    HDL 33 (L) 08/17/2021     Lab Results   Component Value Date    LDLCALC 71 08/17/2021     Lab Results   Component Value Date    TRIG 206 (H) 08/17/2021     Lab Results   Component Value Date    CHOLHDL 4.4 08/17/2021       Lab Results   Component Value Date    WBC 10.36 08/17/2021    HGB 13.8 08/17/2021    HCT 42.5 08/17/2021    MCV 93.4 08/17/2021     08/17/2021           Review of Systems   Respiratory: Negative for cough and shortness of breath.    Cardiovascular: Negative for chest pain, palpitations, orthopnea, claudication, leg swelling and PND.   Neurological: Positive for loss of consciousness.         Objective:   BP (!) 160/90   Pulse 99   Resp 18   Ht 6' 2" (1.88 m)  "  Wt 114.8 kg (253 lb)   BMI 32.48 kg/m²     Physical Exam  Cardiovascular:      Rate and Rhythm: Normal rate.      Pulses: Normal pulses.      Heart sounds: Normal heart sounds. No murmur heard.  Pulmonary:      Effort: Pulmonary effort is normal.      Breath sounds: Normal breath sounds.   Musculoskeletal:         General: No swelling.      Cervical back: Neck supple.   Skin:     Findings: No rash.   Neurological:      Mental Status: He is alert and oriented to person, place, and time.           Assessment:     1. Hypertension, unspecified type  Ambulatory referral/consult to Cardiology   2. Syncope, unspecified syncope type  Ambulatory referral/consult to Cardiology   3. Syncope and collapse  EKG 12-lead   4. Primary hypertension     5. Mixed hyperlipidemia     6. Smoker     7. Methamphetamine use           Plan:   Hypertension  160/100  Start coreg 6.25mg bid  Quit smoking and meth use     Syncope and collapse  Single episode; at a bar  [place] dizzy and passed out  Improved after eating something  ECHO pending    Mixed hyperlipidemia  Start crestor 40mg qd  ASCVD risk 14.9%    Smoker  Smoking counseling performed    Methamphetamine use  UTOX +ve for amphetamine use

## 2022-05-24 ENCOUNTER — OFFICE VISIT (OUTPATIENT)
Dept: CARDIOLOGY | Facility: CLINIC | Age: 57
End: 2022-05-24
Payer: MEDICARE

## 2022-05-24 VITALS
WEIGHT: 253 LBS | DIASTOLIC BLOOD PRESSURE: 90 MMHG | BODY MASS INDEX: 32.47 KG/M2 | HEIGHT: 74 IN | RESPIRATION RATE: 18 BRPM | SYSTOLIC BLOOD PRESSURE: 160 MMHG | HEART RATE: 99 BPM

## 2022-05-24 DIAGNOSIS — R55 SYNCOPE AND COLLAPSE: ICD-10-CM

## 2022-05-24 DIAGNOSIS — R55 SYNCOPE, UNSPECIFIED SYNCOPE TYPE: ICD-10-CM

## 2022-05-24 DIAGNOSIS — F17.200 SMOKER: ICD-10-CM

## 2022-05-24 DIAGNOSIS — I10 HYPERTENSION, UNSPECIFIED TYPE: ICD-10-CM

## 2022-05-24 DIAGNOSIS — F15.10 METHAMPHETAMINE USE: ICD-10-CM

## 2022-05-24 DIAGNOSIS — R55 SYNCOPE AND COLLAPSE: Primary | ICD-10-CM

## 2022-05-24 DIAGNOSIS — E78.2 MIXED HYPERLIPIDEMIA: ICD-10-CM

## 2022-05-24 DIAGNOSIS — I10 PRIMARY HYPERTENSION: ICD-10-CM

## 2022-05-24 PROCEDURE — 99204 OFFICE O/P NEW MOD 45 MIN: CPT | Mod: S$PBB,,, | Performed by: STUDENT IN AN ORGANIZED HEALTH CARE EDUCATION/TRAINING PROGRAM

## 2022-05-24 PROCEDURE — 99214 OFFICE O/P EST MOD 30 MIN: CPT | Mod: PBBFAC | Performed by: STUDENT IN AN ORGANIZED HEALTH CARE EDUCATION/TRAINING PROGRAM

## 2022-05-24 PROCEDURE — 93010 EKG 12-LEAD: ICD-10-PCS | Mod: S$PBB,,, | Performed by: STUDENT IN AN ORGANIZED HEALTH CARE EDUCATION/TRAINING PROGRAM

## 2022-05-24 PROCEDURE — 1159F PR MEDICATION LIST DOCUMENTED IN MEDICAL RECORD: ICD-10-PCS | Mod: CPTII,,, | Performed by: STUDENT IN AN ORGANIZED HEALTH CARE EDUCATION/TRAINING PROGRAM

## 2022-05-24 PROCEDURE — 3008F BODY MASS INDEX DOCD: CPT | Mod: CPTII,,, | Performed by: STUDENT IN AN ORGANIZED HEALTH CARE EDUCATION/TRAINING PROGRAM

## 2022-05-24 PROCEDURE — 3080F PR MOST RECENT DIASTOLIC BLOOD PRESSURE >= 90 MM HG: ICD-10-PCS | Mod: CPTII,,, | Performed by: STUDENT IN AN ORGANIZED HEALTH CARE EDUCATION/TRAINING PROGRAM

## 2022-05-24 PROCEDURE — 3008F PR BODY MASS INDEX (BMI) DOCUMENTED: ICD-10-PCS | Mod: CPTII,,, | Performed by: STUDENT IN AN ORGANIZED HEALTH CARE EDUCATION/TRAINING PROGRAM

## 2022-05-24 PROCEDURE — 3044F HG A1C LEVEL LT 7.0%: CPT | Mod: CPTII,,, | Performed by: STUDENT IN AN ORGANIZED HEALTH CARE EDUCATION/TRAINING PROGRAM

## 2022-05-24 PROCEDURE — 1159F MED LIST DOCD IN RCRD: CPT | Mod: CPTII,,, | Performed by: STUDENT IN AN ORGANIZED HEALTH CARE EDUCATION/TRAINING PROGRAM

## 2022-05-24 PROCEDURE — 3077F PR MOST RECENT SYSTOLIC BLOOD PRESSURE >= 140 MM HG: ICD-10-PCS | Mod: CPTII,,, | Performed by: STUDENT IN AN ORGANIZED HEALTH CARE EDUCATION/TRAINING PROGRAM

## 2022-05-24 PROCEDURE — 99204 PR OFFICE/OUTPT VISIT, NEW, LEVL IV, 45-59 MIN: ICD-10-PCS | Mod: S$PBB,,, | Performed by: STUDENT IN AN ORGANIZED HEALTH CARE EDUCATION/TRAINING PROGRAM

## 2022-05-24 PROCEDURE — 3080F DIAST BP >= 90 MM HG: CPT | Mod: CPTII,,, | Performed by: STUDENT IN AN ORGANIZED HEALTH CARE EDUCATION/TRAINING PROGRAM

## 2022-05-24 PROCEDURE — 93010 ELECTROCARDIOGRAM REPORT: CPT | Mod: S$PBB,,, | Performed by: STUDENT IN AN ORGANIZED HEALTH CARE EDUCATION/TRAINING PROGRAM

## 2022-05-24 PROCEDURE — 3077F SYST BP >= 140 MM HG: CPT | Mod: CPTII,,, | Performed by: STUDENT IN AN ORGANIZED HEALTH CARE EDUCATION/TRAINING PROGRAM

## 2022-05-24 PROCEDURE — 3044F PR MOST RECENT HEMOGLOBIN A1C LEVEL <7.0%: ICD-10-PCS | Mod: CPTII,,, | Performed by: STUDENT IN AN ORGANIZED HEALTH CARE EDUCATION/TRAINING PROGRAM

## 2022-05-24 PROCEDURE — 93005 ELECTROCARDIOGRAM TRACING: CPT | Mod: PBBFAC | Performed by: STUDENT IN AN ORGANIZED HEALTH CARE EDUCATION/TRAINING PROGRAM

## 2022-05-24 RX ORDER — ROSUVASTATIN CALCIUM 40 MG/1
40 TABLET, COATED ORAL NIGHTLY
Qty: 90 TABLET | Refills: 3 | Status: SHIPPED | OUTPATIENT
Start: 2022-05-24 | End: 2022-12-29 | Stop reason: SDUPTHER

## 2022-05-24 RX ORDER — CARVEDILOL 6.25 MG/1
6.25 TABLET ORAL 2 TIMES DAILY WITH MEALS
Qty: 60 TABLET | Refills: 11 | Status: SHIPPED | OUTPATIENT
Start: 2022-05-24 | End: 2022-12-29 | Stop reason: SDUPTHER

## 2022-05-24 NOTE — ASSESSMENT & PLAN NOTE
Single episode; at a bar  [place] dizzy and passed out  Improved after eating something  ECHO pending

## 2022-05-25 ENCOUNTER — TELEPHONE (OUTPATIENT)
Dept: FAMILY MEDICINE | Facility: CLINIC | Age: 57
End: 2022-05-25
Payer: MEDICARE

## 2022-05-25 RX ORDER — SULFAMETHOXAZOLE AND TRIMETHOPRIM 800; 160 MG/1; MG/1
1 TABLET ORAL 2 TIMES DAILY
Qty: 20 TABLET | Refills: 0 | Status: SHIPPED | OUTPATIENT
Start: 2022-05-25 | End: 2022-08-31

## 2022-05-25 NOTE — TELEPHONE ENCOUNTER
----- Message from Tammy Bloom sent at 5/24/2022 12:03 PM CDT -----  Pt would like a refill, call back #[phone]     Called patient to inform him antibiotic has been called in to his pharmacy. Voiced understanding.

## 2022-05-31 ENCOUNTER — OFFICE VISIT (OUTPATIENT)
Dept: FAMILY MEDICINE | Facility: CLINIC | Age: 57
End: 2022-05-31
Payer: MEDICARE

## 2022-05-31 VITALS
BODY MASS INDEX: 30.09 KG/M2 | HEIGHT: 77 IN | SYSTOLIC BLOOD PRESSURE: 114 MMHG | DIASTOLIC BLOOD PRESSURE: 88 MMHG | HEART RATE: 81 BPM | RESPIRATION RATE: 20 BRPM | OXYGEN SATURATION: 97 % | TEMPERATURE: 97 F | WEIGHT: 254.81 LBS

## 2022-05-31 DIAGNOSIS — L02.212 ABSCESS OF LOWER BACK: Primary | ICD-10-CM

## 2022-05-31 PROCEDURE — 3079F PR MOST RECENT DIASTOLIC BLOOD PRESSURE 80-89 MM HG: ICD-10-PCS | Mod: ,,, | Performed by: INTERNAL MEDICINE

## 2022-05-31 PROCEDURE — 3044F PR MOST RECENT HEMOGLOBIN A1C LEVEL <7.0%: ICD-10-PCS | Mod: ,,, | Performed by: INTERNAL MEDICINE

## 2022-05-31 PROCEDURE — 99212 PR OFFICE/OUTPT VISIT, EST, LEVL II, 10-19 MIN: ICD-10-PCS | Mod: ,,, | Performed by: INTERNAL MEDICINE

## 2022-05-31 PROCEDURE — 1160F RVW MEDS BY RX/DR IN RCRD: CPT | Mod: ,,, | Performed by: INTERNAL MEDICINE

## 2022-05-31 PROCEDURE — 3079F DIAST BP 80-89 MM HG: CPT | Mod: ,,, | Performed by: INTERNAL MEDICINE

## 2022-05-31 PROCEDURE — 99212 OFFICE O/P EST SF 10 MIN: CPT | Mod: ,,, | Performed by: INTERNAL MEDICINE

## 2022-05-31 PROCEDURE — 1159F MED LIST DOCD IN RCRD: CPT | Mod: ,,, | Performed by: INTERNAL MEDICINE

## 2022-05-31 PROCEDURE — 3008F BODY MASS INDEX DOCD: CPT | Mod: ,,, | Performed by: INTERNAL MEDICINE

## 2022-05-31 PROCEDURE — 3044F HG A1C LEVEL LT 7.0%: CPT | Mod: ,,, | Performed by: INTERNAL MEDICINE

## 2022-05-31 PROCEDURE — 3074F SYST BP LT 130 MM HG: CPT | Mod: ,,, | Performed by: INTERNAL MEDICINE

## 2022-05-31 PROCEDURE — 1159F PR MEDICATION LIST DOCUMENTED IN MEDICAL RECORD: ICD-10-PCS | Mod: ,,, | Performed by: INTERNAL MEDICINE

## 2022-05-31 PROCEDURE — 3008F PR BODY MASS INDEX (BMI) DOCUMENTED: ICD-10-PCS | Mod: ,,, | Performed by: INTERNAL MEDICINE

## 2022-05-31 PROCEDURE — 3074F PR MOST RECENT SYSTOLIC BLOOD PRESSURE < 130 MM HG: ICD-10-PCS | Mod: ,,, | Performed by: INTERNAL MEDICINE

## 2022-05-31 PROCEDURE — 1160F PR REVIEW ALL MEDS BY PRESCRIBER/CLIN PHARMACIST DOCUMENTED: ICD-10-PCS | Mod: ,,, | Performed by: INTERNAL MEDICINE

## 2022-05-31 NOTE — PATIENT INSTRUCTIONS
Hola was seen today for recurrent skin infections and hypertension.    Diagnoses and all orders for this visit:    Abscess of lower back

## 2022-05-31 NOTE — PROGRESS NOTES
Subjective:       Patient ID: Hola Burrell is a 57 y.o. male.    Chief Complaint: Recurrent Skin Infections and Hypertension    Patient is here today for a follow up evaluation. Patient has no new complaints. Patient pressure is stable today on intake. Patient states abscess is better since previous visit. Patient states he did take his antibiotics. Patient is requesting Covid booster. Will follow in 3 months.       Current Medications:    Current Outpatient Medications:     cariprazine (VRAYLAR) 3 mg Cap, Take 3 mg by mouth once daily., Disp: , Rfl:     carvediloL (COREG) 6.25 MG tablet, Take 1 tablet (6.25 mg total) by mouth 2 (two) times daily with meals., Disp: 60 tablet, Rfl: 11    fluticasone propionate (FLONASE) 50 mcg/actuation nasal spray, 1 spray (50 mcg total) by Each Nostril route once daily., Disp: 16 g, Rfl: 2    gabapentin (NEURONTIN) 300 MG capsule, Take 1 capsule (300 mg total) by mouth 3 (three) times daily., Disp: 90 capsule, Rfl: 11    rosuvastatin (CRESTOR) 40 MG Tab, Take 1 tablet (40 mg total) by mouth every evening., Disp: 90 tablet, Rfl: 3    sulfamethoxazole-trimethoprim 800-160mg (BACTRIM DS) 800-160 mg Tab, Take 1 tablet by mouth 2 (two) times daily., Disp: 20 tablet, Rfl: 0    tamsulosin (FLOMAX) 0.4 mg Cap, Take 1 capsule (0.4 mg total) by mouth once daily., Disp: 90 capsule, Rfl: 1    HYDROcodone-acetaminophen (NORCO) 7.5-325 mg per tablet, Take 1 tablet by mouth 2 (two) times daily as needed (Severe pain). (Patient not taking: No sig reported), Disp: 14 tablet, Rfl: 0    naproxen (NAPROSYN) 500 MG tablet, Take 1 tablet (500 mg total) by mouth 2 (two) times daily as needed (md order). (Patient not taking: No sig reported), Disp: 20 tablet, Rfl: 0    sildenafiL (VIAGRA) 100 MG tablet, Take 0.5 tablets (50 mg total) by mouth daily as needed for Erectile Dysfunction (Take 30 minutes prior to intercourse). (Patient not taking: Reported on 5/31/2022), Disp: 7 tablet, Rfl:  2    zaleplon (SONATA) 5 MG Cap, Take 5 mg by mouth nightly as needed., Disp: , Rfl:     Last Labs:     No visits with results within 1 Month(s) from this visit.   Latest known visit with results is:   Office Visit on 04/20/2022   Component Date Value    EKG 12-Lead 04/20/2022      Ventricular Rate 04/20/2022 88 bmp     IA Interval 04/20/2022 177 ms     QRS Duration 04/20/2022 92 ms     QT/QTc 04/20/2022 353/429 ms     Troponin I High Sensitiv* 04/20/2022 5.9        Last Imaging:  X-Ray Skull Complete Min 4 Views  Narrative: EXAMINATION:  XR SKULL COMPLETE MIN 4 VIEWS    CLINICAL HISTORY:  .  Unspecified fall, initial encounter    COMPARISON:  No previous similar    TECHNIQUE:  Skull four views    FINDINGS:  There is no acute fracture of the calvarium.  There is no focal lytic or blastic lesion.  There are some particulate metallic density foreign bodies scattered over the soft tissues of the head, face, and neck.  Paranasal sinuses are generally clear.  Patient is edentulous  Impression: No acute bony abnormality    Metallic foreign bodies are scattered over the soft tissues of the right head, face, and neck.    Electronically signed by: Emmett Mccall  Date:    04/06/2022  Time:    16:01         Review of Systems   All other systems reviewed and are negative.        Objective:      Physical Exam  Constitutional:       Appearance: Normal appearance. He is normal weight.   Cardiovascular:      Rate and Rhythm: Normal rate and regular rhythm.      Pulses: Normal pulses.      Heart sounds: Normal heart sounds.   Pulmonary:      Effort: Pulmonary effort is normal.      Breath sounds: Normal breath sounds.   Abdominal:      General: Abdomen is flat. Bowel sounds are normal.      Palpations: Abdomen is soft.   Musculoskeletal:         General: Normal range of motion.      Cervical back: Normal range of motion and neck supple.   Skin:     General: Skin is warm and dry.   Neurological:      General: No focal  deficit present.      Mental Status: He is alert and oriented to person, place, and time. Mental status is at baseline.         Assessment:       1. Abscess of lower back          Plan:         Hola was seen today for recurrent skin infections and hypertension.    Diagnoses and all orders for this visit:    Abscess of lower back

## 2022-06-10 ENCOUNTER — IMMUNIZATION (OUTPATIENT)
Dept: FAMILY MEDICINE | Facility: CLINIC | Age: 57
End: 2022-06-10
Payer: MEDICARE

## 2022-06-10 DIAGNOSIS — Z23 NEED FOR VACCINATION: Primary | ICD-10-CM

## 2022-06-10 PROCEDURE — 91306 COVID-19, MRNA, LNP-S, PF, 100 MCG/0.25 ML DOSE VACCINE (MODERNA BOOSTER): ICD-10-PCS | Mod: ,,, | Performed by: FAMILY MEDICINE

## 2022-06-10 PROCEDURE — 91306 COVID-19, MRNA, LNP-S, PF, 100 MCG/0.25 ML DOSE VACCINE (MODERNA BOOSTER): CPT | Mod: ,,, | Performed by: FAMILY MEDICINE

## 2022-06-10 PROCEDURE — 0064A COVID-19, MRNA, LNP-S, PF, 100 MCG/0.25 ML DOSE VACCINE (MODERNA BOOSTER): ICD-10-PCS | Mod: ,,, | Performed by: FAMILY MEDICINE

## 2022-06-10 PROCEDURE — 0064A COVID-19, MRNA, LNP-S, PF, 100 MCG/0.25 ML DOSE VACCINE (MODERNA BOOSTER): CPT | Mod: ,,, | Performed by: FAMILY MEDICINE

## 2022-07-20 ENCOUNTER — OFFICE VISIT (OUTPATIENT)
Dept: SPINE | Facility: CLINIC | Age: 57
End: 2022-07-20
Payer: MEDICARE

## 2022-07-20 DIAGNOSIS — M51.36 DDD (DEGENERATIVE DISC DISEASE), LUMBAR: Primary | ICD-10-CM

## 2022-07-20 DIAGNOSIS — M54.16 LUMBAR RADICULOPATHY, CHRONIC: ICD-10-CM

## 2022-07-20 DIAGNOSIS — M54.16 LUMBAR RADICULOPATHY: ICD-10-CM

## 2022-07-20 PROCEDURE — 3044F HG A1C LEVEL LT 7.0%: CPT | Mod: CPTII,,, | Performed by: ORTHOPAEDIC SURGERY

## 2022-07-20 PROCEDURE — 99406 BEHAV CHNG SMOKING 3-10 MIN: CPT | Mod: S$PBB,,, | Performed by: ORTHOPAEDIC SURGERY

## 2022-07-20 PROCEDURE — 3044F PR MOST RECENT HEMOGLOBIN A1C LEVEL <7.0%: ICD-10-PCS | Mod: CPTII,,, | Performed by: ORTHOPAEDIC SURGERY

## 2022-07-20 PROCEDURE — 99214 OFFICE O/P EST MOD 30 MIN: CPT | Mod: 25,S$PBB,, | Performed by: ORTHOPAEDIC SURGERY

## 2022-07-20 PROCEDURE — 99212 OFFICE O/P EST SF 10 MIN: CPT | Mod: PBBFAC | Performed by: ORTHOPAEDIC SURGERY

## 2022-07-20 PROCEDURE — 99406 PR TOBACCO USE CESSATION INTERMEDIATE 3-10 MINUTES: ICD-10-PCS | Mod: S$PBB,,, | Performed by: ORTHOPAEDIC SURGERY

## 2022-07-20 PROCEDURE — 99214 PR OFFICE/OUTPT VISIT, EST, LEVL IV, 30-39 MIN: ICD-10-PCS | Mod: 25,S$PBB,, | Performed by: ORTHOPAEDIC SURGERY

## 2022-07-20 RX ORDER — PREGABALIN 75 MG/1
75 CAPSULE ORAL 2 TIMES DAILY
Qty: 60 CAPSULE | Refills: 5 | Status: SHIPPED | OUTPATIENT
Start: 2022-07-20 | End: 2022-08-03

## 2022-07-22 NOTE — PROGRESS NOTES
Smoking Cessation counseling    Time spent:  3-10 minutes (70511)    We discussed the importance, over both the short and long-term, of smoking cessation; reviewed the patient's willingness to quit; overall history and current use of tobacco smoking products; that there are a variety of methods to help with smoking diminution and cessation (stopping alone, using nicotine substitution medications/gums, Chantix, other medications, etcetera, which the patient will also discuss with his or her primary care physician); that the patient should set a date for smoking cessation; and the patient will follow up with his or her primary care physician for further support and oversight.    We also discussed that for the patient to have surgery while using nicotine, wound healing may be compromised relative to those who do not smoke; that recovery from anesthesia may sometimes be more difficult; and that quitting may decrease the heart, vascular, pulmonary effects in the short term as well as over the long term.  Smoking cessation may be useful and important for any patient who will have a fusion as part of surgery or have bone or tissue that has regrown heal (bone fusions, wound closures, etc.)  since surgical results with respect to wound healing, susceptibility to recovery from infection, bone fusion, and tissue and blood vessel health may be impaired or less optimal in smokers than nonsmokers.  We talked about the elevated risk of surgical bone fusion failure in the setting of smoking and the potential need for a higher-risk revision surgery should fusion not occur.    I reviewed this with the patient in detail and all questions were answered.  We discussed nature of the patient's condition; real alternatives and realistic options; pros and cons, risks and benefits of all the options, in detail.  I believe the patient understands the above and wishes to proceed as outlined.

## 2022-07-22 NOTE — PROGRESS NOTES
MDM/time:  Greater than 30 minutes spent on this encounter including 10 minutes reviewing imaging and notes, 15 minutes with the patient, 5 minutes documentation    ASSESSMENT:  57 y.o. male with lumbar scoliosis lumbar spondylosis with radiculopathy    PLAN:  Lyrica.  MRI lumbar spine    HPI:  57 y.o. male here for repeat evaluation of low back pain that radiates into bilateral hips.  Pain is about the same.  He has completed physical therapy without relief.  Patient reports years of back pain was working as a  and fell and hurt his back.  He reports increased pain over time more constant dull pain.  Patient denies difficulty with  strength.  Denies difficulty with balance no recent falls.  Denies bladder or bowel incontinence.  Difficulty with walking distance, must stop and rest.  Could not tolerate the Neurontin.  No recent MRI.  No prior spine surgery.  Patient currently smokes 1 pack of cigarettes per day.    IMAGIN2022 lumbar spine x-ray reviewed showed:  On the AP there is lumbar curvature to the left.  There are 5 non-rib-bearing lumbar vertebrae.  On the lateral there is decreased lumbar lordosis.  There is spondylotic disease with decreased disc height and osteophyte formation noted.  No fractures or listhesis noted.  No instability on flexion-extension views.    Past Medical History:   Diagnosis Date    Alcohol dependence with uncomplicated intoxication     Asthma     Bilateral primary osteoarthritis of hip     Bipolar disorder     Drug use     Herpes simplex     hyperlipidemia     Hypertension      No past surgical history on file.  Social History     Tobacco Use    Smoking status: Current Every Day Smoker     Packs/day: 1.00     Years: 40.00     Pack years: 40.00     Types: Cigarettes    Smokeless tobacco: Never Used   Substance Use Topics    Alcohol use: Not Currently    Drug use: Not Currently     Types: Marijuana      Current Outpatient Medications   Medication  Instructions    carvediloL (COREG) 6.25 mg, Oral, 2 times daily with meals    fluticasone propionate (FLONASE) 50 mcg, Each Nostril, Daily    gabapentin (NEURONTIN) 300 mg, Oral, 3 times daily    HYDROcodone-acetaminophen (NORCO) 7.5-325 mg per tablet 1 tablet, Oral, 2 times daily PRN    naproxen (NAPROSYN) 500 mg, Oral, 2 times daily PRN    pregabalin (LYRICA) 75 mg, Oral, 2 times daily    rosuvastatin (CRESTOR) 40 mg, Oral, Nightly    sildenafiL (VIAGRA) 50 mg, Oral, Daily PRN    sulfamethoxazole-trimethoprim 800-160mg (BACTRIM DS) 800-160 mg Tab 1 tablet, Oral, 2 times daily    tamsulosin (FLOMAX) 0.4 mg, Oral, Daily    VRAYLAR 3 mg, Oral, Daily    zaleplon (SONATA) 5 mg, Oral, Nightly PRN        EXAM:  Constitutional  General Appearance:  There is no height or weight on file to calculate BMI., NAD  Psychiatric   Orientation: Oriented to time, oriented to place, oriented to person  Mood and Affect: Active and alert, normal mood, normal affect  Gait and Station   Appearance:  Normal gait, normal tandem gait, able to walk on toes, able to walk on heels    LUMBAR  Musculoskeletal System   Hips: Normal appearance, no leg length discrepancy, normal motion; left, normal motion; right    Lumbar Spine                   Inspection:  Normal alignment, normal sagittal balance                  Range of motion:  Decreased flexion, extension, lateral bending, rotation. Pain with range of motion                  Bony Palpation of the Lumbar Spine:  No tenderness of the spinous process, no tenderness of the sacrum, no tenderness of the coccyx                  Bony Palpation of the Right Hip:  No tenderness of the iliac crest, no tenderness of the sciatic notch, no tenderness of the SI joint                  Bony Palpation of the Left Hip:  No tenderness of the iliac crest, no tenderness of the sciatic notch, no tenderness of the SI joint                  Soft Tissue Palpation on the Right:  No tenderness of the  paraspinal region, no tenderness of the iliolumbar region                  Soft Tissue Palpation on the Left:  No tenderness of the paraspinal region, no tenderness of the iliolumbar region    Motor Strength   L1 Right:  Hip flexion iliopsoas 5/5    L1 Left:  Hip flexion iliopsoas 5/5              L2-L4 Right:  Knee extension quadriceps 5/5, tibialis anterior 5/5              L2-L4 Left:  Knee extension quadriceps 5/5, tibialis anterior 5/5   L5 Right:  Extensor hallucis llongus 5/5,    L5 Left:  Extensor hallucis longus 5/5,    S1 Right:  Plantar flexion gastrocnemius 5/5   S1 Left:  Plantar flexion gastrocnemius 5/5    Neurological System   Ankle Reflex Right:  normal   Ankle Reflex Left: normal   Knee Reflex Right:  normal   Knee Reflex Left:  normal   Sensation on the Right:  L2 normal, L3 normal, L4 normal, L5 normal, S1 normal   Sensation on the Left:  L2 normal, L3 normal, L4 normal, L5 normal, S1 normal              Special Test on the Right:  Seated straight leg raising test negative, no clonus of the ankle              Special Test on the Left:  Seated straight leg raising test negative, no clonus of the ankle    Skin   Lumbosacral Spine:  Normal skin    Cardiovascular System   Arterial Pulses Right:  Posterior tibialis normal, dorsalis pedis normal   Arterial Pulses Left:  Posterior tibialis normal, dorsalis pedis normal   Edema Right: None   Edema Left:  None

## 2022-08-03 ENCOUNTER — HOSPITAL ENCOUNTER (OUTPATIENT)
Dept: RADIOLOGY | Facility: HOSPITAL | Age: 57
Discharge: HOME | End: 2022-08-03
Attending: ORTHOPAEDIC SURGERY
Payer: MEDICARE

## 2022-08-03 ENCOUNTER — OFFICE VISIT (OUTPATIENT)
Dept: SPINE | Facility: CLINIC | Age: 57
End: 2022-08-03
Payer: MEDICARE

## 2022-08-03 DIAGNOSIS — M54.16 LUMBAR RADICULOPATHY, CHRONIC: ICD-10-CM

## 2022-08-03 DIAGNOSIS — M54.16 LUMBAR RADICULOPATHY: ICD-10-CM

## 2022-08-03 DIAGNOSIS — M51.36 DDD (DEGENERATIVE DISC DISEASE), LUMBAR: Primary | ICD-10-CM

## 2022-08-03 PROCEDURE — 1159F MED LIST DOCD IN RCRD: CPT | Mod: CPTII,,, | Performed by: ORTHOPAEDIC SURGERY

## 2022-08-03 PROCEDURE — 72148 MRI LUMBAR SPINE W/O DYE: CPT | Mod: 26,,, | Performed by: RADIOLOGY

## 2022-08-03 PROCEDURE — 72148 MRI LUMBAR SPINE WITHOUT CONTRAST: ICD-10-PCS | Mod: 26,,, | Performed by: RADIOLOGY

## 2022-08-03 PROCEDURE — 99214 OFFICE O/P EST MOD 30 MIN: CPT | Mod: S$PBB,,, | Performed by: ORTHOPAEDIC SURGERY

## 2022-08-03 PROCEDURE — 99214 PR OFFICE/OUTPT VISIT, EST, LEVL IV, 30-39 MIN: ICD-10-PCS | Mod: S$PBB,,, | Performed by: ORTHOPAEDIC SURGERY

## 2022-08-03 PROCEDURE — 99213 OFFICE O/P EST LOW 20 MIN: CPT | Mod: PBBFAC,25 | Performed by: ORTHOPAEDIC SURGERY

## 2022-08-03 PROCEDURE — 3044F PR MOST RECENT HEMOGLOBIN A1C LEVEL <7.0%: ICD-10-PCS | Mod: CPTII,,, | Performed by: ORTHOPAEDIC SURGERY

## 2022-08-03 PROCEDURE — 72148 MRI LUMBAR SPINE W/O DYE: CPT | Mod: TC

## 2022-08-03 PROCEDURE — 3044F HG A1C LEVEL LT 7.0%: CPT | Mod: CPTII,,, | Performed by: ORTHOPAEDIC SURGERY

## 2022-08-03 PROCEDURE — 1159F PR MEDICATION LIST DOCUMENTED IN MEDICAL RECORD: ICD-10-PCS | Mod: CPTII,,, | Performed by: ORTHOPAEDIC SURGERY

## 2022-08-03 NOTE — PROGRESS NOTES
MDM/time:  Greater than 30 minutes spent on this encounter including 10 minutes reviewing imaging and notes, 15 minutes with the patient, 5 minutes documentation    ASSESSMENT:  57 y.o. male with lumbar scoliosis lumbar spondylosis with radiculopathy    PLAN:  Increase Lyrica.  Continue work on smoking cessation.  Pain referral.  Follow-up 4 months    HPI:  57 y.o. male here for repeat evaluation of low back pain that radiates into bilateral hips.  Pain is about the same.  He has completed physical therapy without relief.  Patient reports years of back pain was working as a  and fell and hurt his back.  Reports that Lyrica helps somewhat.  Patient denies difficulty with  strength.  Denies difficulty with balance no recent falls.  Denies bladder or bowel incontinence.  Difficulty with walking distance, must stop and rest.  No prior spine surgery.  Patient currently smokes 1 pack of cigarettes per day.    IMAGIN2022 lumbar spine x-ray reviewed showed:  On the AP there is lumbar curvature to the left.  There are 5 non-rib-bearing lumbar vertebrae.  On the lateral there is decreased lumbar lordosis.  There is spondylotic disease with decreased disc height and osteophyte formation noted.  No fractures or listhesis noted.  No instability on flexion-extension views.    MRI lumbar spine 2022 reviewed shows:  At L3-4 there is moderate bilateral lateral recess stenosis.  Moderate to severe right foraminal stenosis  At L4-5 there is severe left greater than right lateral recess stenosis.  Moderate bilateral foraminal stenosis  At L5-S1 there is moderate right lateral recess stenosis.  Severe left and moderate right foraminal stenosis    Past Medical History:   Diagnosis Date    Alcohol dependence with uncomplicated intoxication     Asthma     Bilateral primary osteoarthritis of hip     Bipolar disorder     Drug use     Herpes simplex     hyperlipidemia     Hypertension      History  reviewed. No pertinent surgical history.  Social History     Tobacco Use    Smoking status: Current Every Day Smoker     Packs/day: 1.00     Years: 40.00     Pack years: 40.00     Types: Cigarettes    Smokeless tobacco: Never Used   Substance Use Topics    Alcohol use: Not Currently    Drug use: Not Currently     Types: Marijuana      Current Outpatient Medications   Medication Instructions    carvediloL (COREG) 6.25 mg, Oral, 2 times daily with meals    fluticasone propionate (FLONASE) 50 mcg, Each Nostril, Daily    gabapentin (NEURONTIN) 300 mg, Oral, 3 times daily    HYDROcodone-acetaminophen (NORCO) 7.5-325 mg per tablet 1 tablet, Oral, 2 times daily PRN    naproxen (NAPROSYN) 500 mg, Oral, 2 times daily PRN    pregabalin (LYRICA) 100 mg, Oral, 2 times daily    rosuvastatin (CRESTOR) 40 mg, Oral, Nightly    sildenafiL (VIAGRA) 50 mg, Oral, Daily PRN    sulfamethoxazole-trimethoprim 800-160mg (BACTRIM DS) 800-160 mg Tab 1 tablet, Oral, 2 times daily    tamsulosin (FLOMAX) 0.4 mg, Oral, Daily    VRAYLAR 3 mg, Oral, Daily    zaleplon (SONATA) 5 mg, Oral, Nightly PRN        EXAM:  Constitutional  General Appearance:  There is no height or weight on file to calculate BMI., NAD  Psychiatric   Orientation: Oriented to time, oriented to place, oriented to person  Mood and Affect: Active and alert, normal mood, normal affect  Gait and Station   Appearance:  Normal gait, normal tandem gait, able to walk on toes, able to walk on heels    LUMBAR  Musculoskeletal System   Hips: Normal appearance, no leg length discrepancy, normal motion; left, normal motion; right    Lumbar Spine                   Inspection:  Normal alignment, normal sagittal balance                  Range of motion:  Decreased flexion, extension, lateral bending, rotation. Pain with range of motion                  Bony Palpation of the Lumbar Spine:  No tenderness of the spinous process, no tenderness of the sacrum, no tenderness of the  coccyx                  Bony Palpation of the Right Hip:  No tenderness of the iliac crest, no tenderness of the sciatic notch, no tenderness of the SI joint                  Bony Palpation of the Left Hip:  No tenderness of the iliac crest, no tenderness of the sciatic notch, no tenderness of the SI joint                  Soft Tissue Palpation on the Right:  No tenderness of the paraspinal region, no tenderness of the iliolumbar region                  Soft Tissue Palpation on the Left:  No tenderness of the paraspinal region, no tenderness of the iliolumbar region    Motor Strength   L1 Right:  Hip flexion iliopsoas 5/5    L1 Left:  Hip flexion iliopsoas 5/5              L2-L4 Right:  Knee extension quadriceps 5/5, tibialis anterior 5/5              L2-L4 Left:  Knee extension quadriceps 5/5, tibialis anterior 5/5   L5 Right:  Extensor hallucis llongus 5/5,    L5 Left:  Extensor hallucis longus 5/5,    S1 Right:  Plantar flexion gastrocnemius 5/5   S1 Left:  Plantar flexion gastrocnemius 5/5    Neurological System   Ankle Reflex Right:  normal   Ankle Reflex Left: normal   Knee Reflex Right:  normal   Knee Reflex Left:  normal   Sensation on the Right:  L2 normal, L3 normal, L4 normal, L5 normal, S1 normal   Sensation on the Left:  L2 normal, L3 normal, L4 normal, L5 normal, S1 normal              Special Test on the Right:  Seated straight leg raising test negative, no clonus of the ankle              Special Test on the Left:  Seated straight leg raising test negative, no clonus of the ankle    Skin   Lumbosacral Spine:  Normal skin    Cardiovascular System   Arterial Pulses Right:  Posterior tibialis normal, dorsalis pedis normal   Arterial Pulses Left:  Posterior tibialis normal, dorsalis pedis normal   Edema Right: None   Edema Left:  None

## 2022-08-04 RX ORDER — PREGABALIN 100 MG/1
100 CAPSULE ORAL 2 TIMES DAILY
Qty: 60 CAPSULE | Refills: 5 | Status: SHIPPED | OUTPATIENT
Start: 2022-08-04 | End: 2023-05-01 | Stop reason: SDUPTHER

## 2022-08-24 NOTE — PROGRESS NOTES
[facility]     PATIENT NAME: Hola Burrell   : 1965    AGE: 57 y.o. DATE: 2022   MRN: [id number]        Reason for Visit / Chief Complaint: Medicare AWV (Initial [facility] Medicare AWV visit )        Hola Burrell presents for an Initial [facility] Medicare AWV today. He is an established pt of Dr. Yañez. He offers no complaints today.     Review of Systems   Constitutional:  Negative for fever, malaise/fatigue and weight loss.   HENT:  Negative for hearing loss and sore throat.    Eyes:  Negative for blurred vision and discharge.   Respiratory:  Negative for cough and shortness of breath.    Cardiovascular:  Negative for chest pain, palpitations and leg swelling.   Gastrointestinal:  Negative for abdominal pain and nausea.   Genitourinary:  Negative for dysuria and flank pain.   Musculoskeletal:  Positive for joint pain. Negative for falls.   Skin:  Negative for itching and rash.   Neurological:  Negative for dizziness, weakness and headaches.   Psychiatric/Behavioral:  Negative for depression.       The following components were reviewed and updated:      Medical/Social/Family History:  Past Medical History:   Diagnosis Date    Alcohol dependence with uncomplicated intoxication     Asthma     Bilateral primary osteoarthritis of hip     Bipolar disorder     Drug use     Herpes simplex     hyperlipidemia     Hypertension         Family History   Problem Relation Age of Onset    Cancer Mother     Cancer Father     Cancer Sister     Mental illness Sister     Cancer Brother     Diabetes Maternal Aunt     Diabetes Maternal Uncle     Hypertension Maternal Grandfather         History reviewed. No pertinent surgical history.    Social History     Tobacco Use   Smoking Status Every Day    Packs/day: 1.00    Years: 40.00    Pack years: 40.00    Types: Cigarettes   Smokeless Tobacco Never       Social History     Substance and Sexual Activity   Alcohol Use Not Currently          Allergies and Current Medications     Review of patient's allergies indicates:   Allergen Reactions    Desyrel [trazodone]        Current Outpatient Medications:     cariprazine (VRAYLAR) 3 mg Cap, Take 3 mg by mouth once daily., Disp: , Rfl:     carvediloL (COREG) 6.25 MG tablet, Take 1 tablet (6.25 mg total) by mouth 2 (two) times daily with meals., Disp: 60 tablet, Rfl: 11    fluticasone propionate (FLONASE) 50 mcg/actuation nasal spray, 1 spray (50 mcg total) by Each Nostril route once daily., Disp: 16 g, Rfl: 2    pregabalin (LYRICA) 100 MG capsule, Take 1 capsule (100 mg total) by mouth 2 (two) times daily., Disp: 60 capsule, Rfl: 5    rosuvastatin (CRESTOR) 40 MG Tab, Take 1 tablet (40 mg total) by mouth every evening., Disp: 90 tablet, Rfl: 3    sildenafiL (VIAGRA) 100 MG tablet, Take 0.5 tablets (50 mg total) by mouth daily as needed for Erectile Dysfunction (Take 30 minutes prior to intercourse)., Disp: 7 tablet, Rfl: 2    tamsulosin (FLOMAX) 0.4 mg Cap, Take 1 capsule (0.4 mg total) by mouth once daily., Disp: 90 capsule, Rfl: 1    zaleplon (SONATA) 5 MG Cap, Take 5 mg by mouth nightly as needed., Disp: , Rfl:     gabapentin (NEURONTIN) 300 MG capsule, Take 1 capsule (300 mg total) by mouth 3 (three) times daily. (Patient not taking: Reported on 8/30/2022), Disp: 90 capsule, Rfl: 11    HYDROcodone-acetaminophen (NORCO) 7.5-325 mg per tablet, Take 1 tablet by mouth 2 (two) times daily as needed (Severe pain). (Patient not taking: No sig reported), Disp: 14 tablet, Rfl: 0    naproxen (NAPROSYN) 500 MG tablet, Take 1 tablet (500 mg total) by mouth 2 (two) times daily as needed (md order). (Patient not taking: No sig reported), Disp: 20 tablet, Rfl: 0    sulfamethoxazole-trimethoprim 800-160mg (BACTRIM DS) 800-160 mg Tab, Take 1 tablet by mouth 2 (two) times daily. (Patient not taking: Reported on 8/30/2022), Disp: 20 tablet, Rfl: 0      Health Risk Assessment   Fall Risk:  not at risk   Obesity: BMI  Body mass index is 31.53 kg/m².   Advance Directive: no but information given   Depression: PHQ9- 1   HTN:  DASH diet, exercise, weight management, med compliance, home BP monitoring, and follow-up discussed.   T2DM: no  STI: not at risk   Statin Use: yes      Health Maintenance   Last eye exam: states saw Dr. Joyce 7/22   Last CV screen with lipids:drawn this visit   Diabetes screening with fasting glucose or A1c: 4/6/22   Colonoscopy: 8/11/20 Dr. Gupta -repeat in 10 years   Flu Vaccine: end of season-declined   Pneumonia vaccines: declined   COVID vaccine: (moderna) 4/20/21, 5/18/21, 12/23/21, 6/10/22   Hep B vaccine: na   DEXA: na   Last PSA screen: drawn this visit  AAA screening: na   HIV Screening: drawn this visit  Hepatitis C Screen: 8/17/21  Low Dose CT Scan: 7/25/20- referral sent this visit    Health Maintenance Topics with due status: Not Due       Topic Last Completion Date    Colorectal Cancer Screening 08/11/2020    TETANUS VACCINE 08/17/2021    Lipid Panel 08/17/2021    Influenza Vaccine Not Due     Health Maintenance Due   Topic Date Due    LDCT Lung Screen  07/15/2021         Lab results available in Epic or see dates from [facility] above:   Lab Results   Component Value Date    CHOL 145 08/17/2021     Lab Results   Component Value Date    HDL 33 (L) 08/17/2021     Lab Results   Component Value Date    LDLCALC 71 08/17/2021     Lab Results   Component Value Date    TRIG 206 (H) 08/17/2021     Lab Results   Component Value Date    CHOLHDL 4.4 08/17/2021       Lab Results   Component Value Date    HGBA1C 5.3 04/06/2022       Sodium   Date Value Ref Range Status   04/06/2022 139 136 - 145 mmol/L Final     Potassium   Date Value Ref Range Status   04/06/2022 3.8 3.5 - 5.1 mmol/L Final     Chloride   Date Value Ref Range Status   04/06/2022 109 (H) 98 - 107 mmol/L Final     CO2   Date Value Ref Range Status   04/06/2022 29 21 - 32 mmol/L Final     Glucose   Date Value Ref Range Status   04/06/2022 94 74 -  "106 mg/dL Final     BUN   Date Value Ref Range Status   04/06/2022 7 7 - 18 mg/dL Final     Creatinine   Date Value Ref Range Status   04/06/2022 0.99 0.70 - 1.30 mg/dL Final     Calcium   Date Value Ref Range Status   04/06/2022 8.9 8.5 - 10.1 mg/dL Final     Total Protein   Date Value Ref Range Status   08/17/2021 6.7 6.4 - 8.2 g/dL Final     Albumin   Date Value Ref Range Status   08/17/2021 3.1 (L) 3.5 - 5.0 g/dL Final     Bilirubin, Total   Date Value Ref Range Status   08/17/2021 0.1 >0.0 - 1.2 mg/dL Final     Alk Phos   Date Value Ref Range Status   08/17/2021 78 45 - 115 U/L Final     AST   Date Value Ref Range Status   08/17/2021 10 (L) 15 - 37 U/L Final     ALT   Date Value Ref Range Status   08/17/2021 22 16 - 61 U/L Final     Anion Gap   Date Value Ref Range Status   04/06/2022 5 (L) 7 - 16 mmol/L Final     eGFR    Date Value Ref Range Status   08/17/2021 94 >=60 mL/min/1.73m² Final     eGFR   Date Value Ref Range Status   04/06/2022 83 >=60 mL/min/1.73m² Final         Incontinence  Bowel: no  Bladder: no       Care Team  Dr. Yañez -PCP                 Dr. Villegas-cardiology                 [facility] -psychiatry                            Dr. Foy-orthospine    **See Completed Assessments for Annual Wellness visit within the encounter summary    The following assessments were completed & reviewed:  Depression Screening  Cognitive function Screening  Timed Get Up Test  Whisper Test  Vision Screen  Health Risk Assessment  Checklist of ADLs and IADLs        Objective  Vitals:    08/30/22 1534 08/30/22 1541   BP: (!) 142/100 (!) 140/98   Pulse: 75    Resp: 18    Temp: 98.1 °F (36.7 °C)    TempSrc: Oral    SpO2: 99%    Weight: 117.5 kg (259 lb)    Height: 6' 4" (1.93 m)    PainSc:   8    PainLoc: Back       Body mass index is 31.53 kg/m².  Ideal body weight: 86.8 kg (191 lb 5.7 oz)       Physical Exam  Vitals reviewed.   Constitutional:       Appearance: Normal appearance. He is " obese.   HENT:      Head: Normocephalic.      Mouth/Throat:      Mouth: Mucous membranes are moist.   Cardiovascular:      Rate and Rhythm: Normal rate and regular rhythm.      Pulses: Normal pulses.      Heart sounds: Normal heart sounds.   Pulmonary:      Effort: Pulmonary effort is normal.      Breath sounds: Normal breath sounds.   Abdominal:      Palpations: Abdomen is soft.      Tenderness: There is no abdominal tenderness.   Musculoskeletal:         General: Normal range of motion.      Cervical back: Neck supple.   Skin:     General: Skin is warm and dry.   Neurological:      Mental Status: He is alert and oriented to person, place, and time.   Psychiatric:         Mood and Affect: Mood normal.         Behavior: Behavior normal.       Assessment:     1. Encounter for initial annual wellness visit (AWV) in Medicare patient    2. Hypertension, unspecified type    3. Hyperlipidemia, unspecified hyperlipidemia type    4. DDD (degenerative disc disease), lumbar    5. Tachycardia    6. Bipolar affective disorder, remission status unspecified    7. Screening for prostate cancer    8. Screening for HIV (human immunodeficiency virus)    9. Screening for lung cancer    10. BMI 31.0-31.9,adult         Plan:    Referrals:   Lung Cancer screening      Advised to call office if does not hear from anyone with referral appt within 2-3 weeks to check on status of referral. Voiced understanding.      Discussed and provided with a screening schedule and personal prevention plan in accordance with USPSTF age appropriate recommendations and Medicare screening guidelines.   Education, counseling, and referrals were provided as needed.  After Visit Summary printed and given to patient which includes written education and a list of any referrals if indicated.     Education including diet, exercise, falls and advanced directives discussed with patient and patient verbalized understanding.      F/u plan for yearly AWV.    Signature:  Leelee Bolanos AGNP-BC

## 2022-08-30 ENCOUNTER — OFFICE VISIT (OUTPATIENT)
Dept: FAMILY MEDICINE | Facility: CLINIC | Age: 57
End: 2022-08-30
Payer: MEDICARE

## 2022-08-30 VITALS
OXYGEN SATURATION: 99 % | DIASTOLIC BLOOD PRESSURE: 98 MMHG | TEMPERATURE: 98 F | HEART RATE: 75 BPM | SYSTOLIC BLOOD PRESSURE: 140 MMHG | HEIGHT: 76 IN | BODY MASS INDEX: 31.54 KG/M2 | WEIGHT: 259 LBS | RESPIRATION RATE: 18 BRPM

## 2022-08-30 DIAGNOSIS — Z12.5 SCREENING FOR PROSTATE CANCER: ICD-10-CM

## 2022-08-30 DIAGNOSIS — I10 HYPERTENSION, UNSPECIFIED TYPE: ICD-10-CM

## 2022-08-30 DIAGNOSIS — Z12.2 SCREENING FOR LUNG CANCER: ICD-10-CM

## 2022-08-30 DIAGNOSIS — E78.5 HYPERLIPIDEMIA, UNSPECIFIED HYPERLIPIDEMIA TYPE: ICD-10-CM

## 2022-08-30 DIAGNOSIS — R00.0 TACHYCARDIA: ICD-10-CM

## 2022-08-30 DIAGNOSIS — Z00.00 ENCOUNTER FOR INITIAL ANNUAL WELLNESS VISIT (AWV) IN MEDICARE PATIENT: Primary | ICD-10-CM

## 2022-08-30 DIAGNOSIS — F31.9 BIPOLAR AFFECTIVE DISORDER, REMISSION STATUS UNSPECIFIED: ICD-10-CM

## 2022-08-30 DIAGNOSIS — Z11.4 SCREENING FOR HIV (HUMAN IMMUNODEFICIENCY VIRUS): ICD-10-CM

## 2022-08-30 DIAGNOSIS — M51.36 DDD (DEGENERATIVE DISC DISEASE), LUMBAR: ICD-10-CM

## 2022-08-30 PROBLEM — M51.369 DDD (DEGENERATIVE DISC DISEASE), LUMBAR: Status: ACTIVE | Noted: 2022-08-30

## 2022-08-30 PROCEDURE — 80061 LIPID PANEL: ICD-10-PCS | Mod: ,,, | Performed by: CLINICAL MEDICAL LABORATORY

## 2022-08-30 PROCEDURE — 3008F PR BODY MASS INDEX (BMI) DOCUMENTED: ICD-10-PCS | Mod: ,,, | Performed by: NURSE PRACTITIONER

## 2022-08-30 PROCEDURE — G0103 PSA SCREENING: HCPCS | Mod: ,,, | Performed by: CLINICAL MEDICAL LABORATORY

## 2022-08-30 PROCEDURE — 1159F PR MEDICATION LIST DOCUMENTED IN MEDICAL RECORD: ICD-10-PCS | Mod: ,,, | Performed by: NURSE PRACTITIONER

## 2022-08-30 PROCEDURE — G0438 PPPS, INITIAL VISIT: HCPCS | Mod: ,,, | Performed by: NURSE PRACTITIONER

## 2022-08-30 PROCEDURE — G0103 PSA, SCREENING: ICD-10-PCS | Mod: ,,, | Performed by: CLINICAL MEDICAL LABORATORY

## 2022-08-30 PROCEDURE — 3044F PR MOST RECENT HEMOGLOBIN A1C LEVEL <7.0%: ICD-10-PCS | Mod: ,,, | Performed by: NURSE PRACTITIONER

## 2022-08-30 PROCEDURE — 1160F RVW MEDS BY RX/DR IN RCRD: CPT | Mod: ,,, | Performed by: NURSE PRACTITIONER

## 2022-08-30 PROCEDURE — 87389 HIV-1 AG W/HIV-1&-2 AB AG IA: CPT | Mod: ,,, | Performed by: CLINICAL MEDICAL LABORATORY

## 2022-08-30 PROCEDURE — G0438 PR WELCOME MEDICARE ANNUAL WELLNESS INITIAL VISIT: ICD-10-PCS | Mod: ,,, | Performed by: NURSE PRACTITIONER

## 2022-08-30 PROCEDURE — 1159F MED LIST DOCD IN RCRD: CPT | Mod: ,,, | Performed by: NURSE PRACTITIONER

## 2022-08-30 PROCEDURE — 3077F SYST BP >= 140 MM HG: CPT | Mod: ,,, | Performed by: NURSE PRACTITIONER

## 2022-08-30 PROCEDURE — 3077F PR MOST RECENT SYSTOLIC BLOOD PRESSURE >= 140 MM HG: ICD-10-PCS | Mod: ,,, | Performed by: NURSE PRACTITIONER

## 2022-08-30 PROCEDURE — 3080F DIAST BP >= 90 MM HG: CPT | Mod: ,,, | Performed by: NURSE PRACTITIONER

## 2022-08-30 PROCEDURE — 80061 LIPID PANEL: CPT | Mod: ,,, | Performed by: CLINICAL MEDICAL LABORATORY

## 2022-08-30 PROCEDURE — 87389 HIV 1 / 2 ANTIBODY: ICD-10-PCS | Mod: ,,, | Performed by: CLINICAL MEDICAL LABORATORY

## 2022-08-30 PROCEDURE — 1160F PR REVIEW ALL MEDS BY PRESCRIBER/CLIN PHARMACIST DOCUMENTED: ICD-10-PCS | Mod: ,,, | Performed by: NURSE PRACTITIONER

## 2022-08-30 PROCEDURE — 3080F PR MOST RECENT DIASTOLIC BLOOD PRESSURE >= 90 MM HG: ICD-10-PCS | Mod: ,,, | Performed by: NURSE PRACTITIONER

## 2022-08-30 PROCEDURE — 3044F HG A1C LEVEL LT 7.0%: CPT | Mod: ,,, | Performed by: NURSE PRACTITIONER

## 2022-08-30 PROCEDURE — 3008F BODY MASS INDEX DOCD: CPT | Mod: ,,, | Performed by: NURSE PRACTITIONER

## 2022-08-30 NOTE — PATIENT INSTRUCTIONS
Counseling and Referral of Other Preventative  (Italic type indicates deductible and co-insurance are waived)    Patient Name: Hola Burrell  Today's Date: 8/30/2022    Health Maintenance         Date Due Completion Date    Pneumococcal Vaccines (Age 0-64) (1 - PCV) Never done ---declined    LDCT Lung Screen 07/15/2021 7/15/2020-referral sent    Shingles Vaccine (2 of 2) 06/02/2023 (Originally 10/12/2021) 8/17/2021    HIV Screening 08/17/2027 (Originally 1/13/1980) ---drawn this visit    Influenza Vaccine (1) 09/01/2022 ---    Lipid Panel 08/17/2026 8/17/2021-drawn this visit    Colorectal Cancer Screening 08/11/2030 8/11/2020    TETANUS VACCINE 08/17/2031 8/17/2021          No orders of the defined types were placed in this encounter.

## 2022-08-31 ENCOUNTER — OFFICE VISIT (OUTPATIENT)
Dept: FAMILY MEDICINE | Facility: CLINIC | Age: 57
End: 2022-08-31
Payer: MEDICARE

## 2022-08-31 VITALS
OXYGEN SATURATION: 97 % | WEIGHT: 252 LBS | HEART RATE: 80 BPM | SYSTOLIC BLOOD PRESSURE: 160 MMHG | DIASTOLIC BLOOD PRESSURE: 92 MMHG | BODY MASS INDEX: 29.76 KG/M2 | TEMPERATURE: 97 F | RESPIRATION RATE: 18 BRPM | HEIGHT: 77 IN

## 2022-08-31 DIAGNOSIS — D22.9 SKIN MOLE: Primary | ICD-10-CM

## 2022-08-31 DIAGNOSIS — G62.9 NEUROPATHY: ICD-10-CM

## 2022-08-31 DIAGNOSIS — I10 PRIMARY HYPERTENSION: ICD-10-CM

## 2022-08-31 LAB
CHOLEST SERPL-MCNC: 90 MG/DL (ref 0–200)
CHOLEST/HDLC SERPL: 2.1 {RATIO}
HDLC SERPL-MCNC: 43 MG/DL (ref 40–60)
HIV 1+O+2 AB SERPL QL: NORMAL
LDLC SERPL CALC-MCNC: 28 MG/DL
LDLC/HDLC SERPL: 0.7 {RATIO}
NONHDLC SERPL-MCNC: 47 MG/DL
PSA SERPL-MCNC: 1.17 NG/ML (ref 0–3.1)
TRIGL SERPL-MCNC: 96 MG/DL (ref 35–150)
VLDLC SERPL-MCNC: 19 MG/DL

## 2022-08-31 PROCEDURE — 3080F PR MOST RECENT DIASTOLIC BLOOD PRESSURE >= 90 MM HG: ICD-10-PCS | Mod: ,,, | Performed by: INTERNAL MEDICINE

## 2022-08-31 PROCEDURE — 3008F BODY MASS INDEX DOCD: CPT | Mod: ,,, | Performed by: INTERNAL MEDICINE

## 2022-08-31 PROCEDURE — 3044F PR MOST RECENT HEMOGLOBIN A1C LEVEL <7.0%: ICD-10-PCS | Mod: ,,, | Performed by: INTERNAL MEDICINE

## 2022-08-31 PROCEDURE — 1160F PR REVIEW ALL MEDS BY PRESCRIBER/CLIN PHARMACIST DOCUMENTED: ICD-10-PCS | Mod: ,,, | Performed by: INTERNAL MEDICINE

## 2022-08-31 PROCEDURE — 3080F DIAST BP >= 90 MM HG: CPT | Mod: ,,, | Performed by: INTERNAL MEDICINE

## 2022-08-31 PROCEDURE — 3008F PR BODY MASS INDEX (BMI) DOCUMENTED: ICD-10-PCS | Mod: ,,, | Performed by: INTERNAL MEDICINE

## 2022-08-31 PROCEDURE — 1159F PR MEDICATION LIST DOCUMENTED IN MEDICAL RECORD: ICD-10-PCS | Mod: ,,, | Performed by: INTERNAL MEDICINE

## 2022-08-31 PROCEDURE — 99214 OFFICE O/P EST MOD 30 MIN: CPT | Mod: ,,, | Performed by: INTERNAL MEDICINE

## 2022-08-31 PROCEDURE — 3077F PR MOST RECENT SYSTOLIC BLOOD PRESSURE >= 140 MM HG: ICD-10-PCS | Mod: ,,, | Performed by: INTERNAL MEDICINE

## 2022-08-31 PROCEDURE — 99214 PR OFFICE/OUTPT VISIT, EST, LEVL IV, 30-39 MIN: ICD-10-PCS | Mod: ,,, | Performed by: INTERNAL MEDICINE

## 2022-08-31 PROCEDURE — 4010F ACE/ARB THERAPY RXD/TAKEN: CPT | Mod: ,,, | Performed by: INTERNAL MEDICINE

## 2022-08-31 PROCEDURE — 1159F MED LIST DOCD IN RCRD: CPT | Mod: ,,, | Performed by: INTERNAL MEDICINE

## 2022-08-31 PROCEDURE — 4010F PR ACE/ARB THEARPY RXD/TAKEN: ICD-10-PCS | Mod: ,,, | Performed by: INTERNAL MEDICINE

## 2022-08-31 PROCEDURE — 3077F SYST BP >= 140 MM HG: CPT | Mod: ,,, | Performed by: INTERNAL MEDICINE

## 2022-08-31 PROCEDURE — 3044F HG A1C LEVEL LT 7.0%: CPT | Mod: ,,, | Performed by: INTERNAL MEDICINE

## 2022-08-31 PROCEDURE — 1160F RVW MEDS BY RX/DR IN RCRD: CPT | Mod: ,,, | Performed by: INTERNAL MEDICINE

## 2022-08-31 RX ORDER — LOSARTAN POTASSIUM 50 MG/1
50 TABLET ORAL DAILY
Qty: 30 TABLET | Refills: 1 | Status: SHIPPED | OUTPATIENT
Start: 2022-08-31 | End: 2022-09-22

## 2022-08-31 RX ORDER — GABAPENTIN 300 MG/1
300 CAPSULE ORAL 2 TIMES DAILY
Qty: 60 CAPSULE | Refills: 0 | Status: SHIPPED | OUTPATIENT
Start: 2022-08-31 | End: 2022-09-29

## 2022-08-31 RX ORDER — FLUTICASONE PROPIONATE 50 MCG
1 SPRAY, SUSPENSION (ML) NASAL DAILY
Qty: 16 G | Refills: 2 | Status: SHIPPED | OUTPATIENT
Start: 2022-08-31 | End: 2023-05-01 | Stop reason: SDUPTHER

## 2022-08-31 NOTE — PATIENT INSTRUCTIONS
Hola was seen today for numbness, mass and medication refill.    Diagnoses and all orders for this visit:    Neuropathy    Primary hypertension    Skin mole    Other orders  The following orders have not been finalized:  -     fluticasone propionate (FLONASE) 50 mcg/actuation nasal spray  -     gabapentin (NEURONTIN) 300 MG capsule  -     losartan (COZAAR) 50 MG tablet

## 2022-08-31 NOTE — PROGRESS NOTES
Subjective:       Patient ID: Hola Burrell is a 57 y.o. male.    Chief Complaint: Numbness (handss), Mass (Back of head), and Medication Refill    Patient is here today for a follow up evaluation. Patient blood pressure is increased today on intake, 160/92. Patient states he does not take a blood pressure medication. Will order Losartan 50mg PO QD #30 RF1. Patient has a complaint of numbness bilateral hands. Will order Neurontin 300mg PO BID #60. Patient has a complaint of mole in his scalp. Will refer to Dermatology. Patient is requesting medication refills. Will refill today. Will follow in 1 month.       Current Medications:    Current Outpatient Medications:     cariprazine (VRAYLAR) 3 mg Cap, Take 3 mg by mouth once daily., Disp: , Rfl:     carvediloL (COREG) 6.25 MG tablet, Take 1 tablet (6.25 mg total) by mouth 2 (two) times daily with meals., Disp: 60 tablet, Rfl: 11    fluticasone propionate (FLONASE) 50 mcg/actuation nasal spray, 1 spray (50 mcg total) by Each Nostril route once daily., Disp: 16 g, Rfl: 2    pregabalin (LYRICA) 100 MG capsule, Take 1 capsule (100 mg total) by mouth 2 (two) times daily., Disp: 60 capsule, Rfl: 5    rosuvastatin (CRESTOR) 40 MG Tab, Take 1 tablet (40 mg total) by mouth every evening., Disp: 90 tablet, Rfl: 3    tamsulosin (FLOMAX) 0.4 mg Cap, Take 1 capsule (0.4 mg total) by mouth once daily., Disp: 90 capsule, Rfl: 1    Last Labs:     Office Visit on 08/30/2022   Component Date Value    Triglycerides 08/30/2022 96     Cholesterol 08/30/2022 90     HDL Cholesterol 08/30/2022 43     Cholesterol/HDL Ratio (R* 08/30/2022 2.1     Non-HDL 08/30/2022 47     LDL Calculated 08/30/2022 28     LDL/HDL 08/30/2022 0.7     VLDL 08/30/2022 19     HIV 1/2 08/30/2022 Non-Reactive     PSA Total 08/30/2022 1.170    Patient Outreach on 08/17/2022   Component Date Value    CRC Recommendation Exter* 08/11/2020 Repeat colonoscopy in 10 years        Last Imaging:  MRI Lumbar Spine Without  Contrast  Narrative: EXAMINATION:  MRI LUMBAR SPINE WITHOUT CONTRAST    CLINICAL HISTORY:  Lumbar radiculopathy, symptoms persist with conservative treatment; Radiculopathy, lumbar region    TECHNIQUE:  Multiplanar, multisequence MRI of the lumbar spine performed without the administration of contrast.    COMPARISON:  Radiograph 04/04/2022    FINDINGS:  The leftward curvature of the lumbar spine is again seen but better depicted on the recent radiograph.  The sagittal alignment appears similar to the radiograph with a minimal degree of retrolisthesis of L4 on L5.  Degenerative endplate changes are seen throughout.  Conus terminates at L1.    L1-L2: Disc bulge and facet degeneration.  No spinal canal stenosis.  Mild bilateral foraminal stenosis.    L2-3: Disc bulge.  Facet degeneration.  Mild spinal canal and mild bilateral foraminal stenosis.    L3-4: Disc bulge and facet degeneration.  Mild spinal canal stenosis.  Moderate right and mild left foraminal stenosis.    L4-5: Disc bulge and facet degeneration.  Mild spinal canal stenosis.  Moderate bilateral foraminal stenosis.    L5-S1: Disc bulge and facet degeneration.  Mild spinal canal stenosis.  Moderate left and mild right foraminal stenosis.  Impression: Multilevel degenerative changes throughout the lumbar spine.    Electronically signed by: Alli Candelaria  Date:    08/03/2022  Time:    12:38         Review of Systems   Integumentary:  Positive for mole/lesion.   Neurological:  Positive for numbness.   All other systems reviewed and are negative.      Objective:      Physical Exam  Constitutional:       Appearance: Normal appearance. He is normal weight.   Cardiovascular:      Rate and Rhythm: Normal rate and regular rhythm.      Pulses: Normal pulses.      Heart sounds: Normal heart sounds.   Pulmonary:      Effort: Pulmonary effort is normal.      Breath sounds: Normal breath sounds.   Abdominal:      General: Abdomen is flat. Bowel sounds are normal.       Palpations: Abdomen is soft.   Musculoskeletal:         General: Normal range of motion.      Cervical back: Normal range of motion and neck supple.   Skin:     General: Skin is warm and dry.      Comments: mole   Neurological:      General: No focal deficit present.      Mental Status: He is alert and oriented to person, place, and time. Mental status is at baseline.       Assessment:       1. Neuropathy        2. Primary hypertension        3. Skin mole             Plan:         Hola was seen today for numbness, mass and medication refill.    Diagnoses and all orders for this visit:    Neuropathy    Primary hypertension    Skin mole    Other orders  The following orders have not been finalized:  -     fluticasone propionate (FLONASE) 50 mcg/actuation nasal spray  -     gabapentin (NEURONTIN) 300 MG capsule  -     losartan (COZAAR) 50 MG tablet

## 2022-09-22 ENCOUNTER — OFFICE VISIT (OUTPATIENT)
Dept: CARDIOLOGY | Facility: CLINIC | Age: 57
End: 2022-09-22
Payer: MEDICARE

## 2022-09-22 VITALS
SYSTOLIC BLOOD PRESSURE: 130 MMHG | DIASTOLIC BLOOD PRESSURE: 80 MMHG | RESPIRATION RATE: 18 BRPM | HEART RATE: 72 BPM | HEIGHT: 74 IN | WEIGHT: 250 LBS | BODY MASS INDEX: 32.08 KG/M2

## 2022-09-22 DIAGNOSIS — I10 PRIMARY HYPERTENSION: ICD-10-CM

## 2022-09-22 DIAGNOSIS — F15.10 METHAMPHETAMINE USE: ICD-10-CM

## 2022-09-22 PROCEDURE — 3044F PR MOST RECENT HEMOGLOBIN A1C LEVEL <7.0%: ICD-10-PCS | Mod: CPTII,,, | Performed by: STUDENT IN AN ORGANIZED HEALTH CARE EDUCATION/TRAINING PROGRAM

## 2022-09-22 PROCEDURE — 4010F PR ACE/ARB THEARPY RXD/TAKEN: ICD-10-PCS | Mod: CPTII,,, | Performed by: STUDENT IN AN ORGANIZED HEALTH CARE EDUCATION/TRAINING PROGRAM

## 2022-09-22 PROCEDURE — 1159F MED LIST DOCD IN RCRD: CPT | Mod: CPTII,,, | Performed by: STUDENT IN AN ORGANIZED HEALTH CARE EDUCATION/TRAINING PROGRAM

## 2022-09-22 PROCEDURE — 4010F ACE/ARB THERAPY RXD/TAKEN: CPT | Mod: CPTII,,, | Performed by: STUDENT IN AN ORGANIZED HEALTH CARE EDUCATION/TRAINING PROGRAM

## 2022-09-22 PROCEDURE — 99213 OFFICE O/P EST LOW 20 MIN: CPT | Mod: PBBFAC | Performed by: STUDENT IN AN ORGANIZED HEALTH CARE EDUCATION/TRAINING PROGRAM

## 2022-09-22 PROCEDURE — 99213 OFFICE O/P EST LOW 20 MIN: CPT | Mod: S$PBB,,, | Performed by: STUDENT IN AN ORGANIZED HEALTH CARE EDUCATION/TRAINING PROGRAM

## 2022-09-22 PROCEDURE — 3044F HG A1C LEVEL LT 7.0%: CPT | Mod: CPTII,,, | Performed by: STUDENT IN AN ORGANIZED HEALTH CARE EDUCATION/TRAINING PROGRAM

## 2022-09-22 PROCEDURE — 99213 PR OFFICE/OUTPT VISIT, EST, LEVL III, 20-29 MIN: ICD-10-PCS | Mod: S$PBB,,, | Performed by: STUDENT IN AN ORGANIZED HEALTH CARE EDUCATION/TRAINING PROGRAM

## 2022-09-22 PROCEDURE — 3008F BODY MASS INDEX DOCD: CPT | Mod: CPTII,,, | Performed by: STUDENT IN AN ORGANIZED HEALTH CARE EDUCATION/TRAINING PROGRAM

## 2022-09-22 PROCEDURE — 3079F DIAST BP 80-89 MM HG: CPT | Mod: CPTII,,, | Performed by: STUDENT IN AN ORGANIZED HEALTH CARE EDUCATION/TRAINING PROGRAM

## 2022-09-22 PROCEDURE — 3079F PR MOST RECENT DIASTOLIC BLOOD PRESSURE 80-89 MM HG: ICD-10-PCS | Mod: CPTII,,, | Performed by: STUDENT IN AN ORGANIZED HEALTH CARE EDUCATION/TRAINING PROGRAM

## 2022-09-22 PROCEDURE — 3075F SYST BP GE 130 - 139MM HG: CPT | Mod: CPTII,,, | Performed by: STUDENT IN AN ORGANIZED HEALTH CARE EDUCATION/TRAINING PROGRAM

## 2022-09-22 PROCEDURE — 3008F PR BODY MASS INDEX (BMI) DOCUMENTED: ICD-10-PCS | Mod: CPTII,,, | Performed by: STUDENT IN AN ORGANIZED HEALTH CARE EDUCATION/TRAINING PROGRAM

## 2022-09-22 PROCEDURE — 3075F PR MOST RECENT SYSTOLIC BLOOD PRESS GE 130-139MM HG: ICD-10-PCS | Mod: CPTII,,, | Performed by: STUDENT IN AN ORGANIZED HEALTH CARE EDUCATION/TRAINING PROGRAM

## 2022-09-22 PROCEDURE — 1159F PR MEDICATION LIST DOCUMENTED IN MEDICAL RECORD: ICD-10-PCS | Mod: CPTII,,, | Performed by: STUDENT IN AN ORGANIZED HEALTH CARE EDUCATION/TRAINING PROGRAM

## 2022-09-22 NOTE — PROGRESS NOTES
PCP: Kali Yañez MD    Referring Provider:     Subjective:   Hola Burrell is a 57 y.o. male with hx of HTN, HLD, bipolar disorder, amphetamine use who presents for evaluation of syncope.       9/22/22 - Doing well. BP is better controlled. Reports getting started on losartan but had dizzy episodes that lead him to discontinue it. Currently well controlled coreg    5/2022 -Patient had syncopal spell that he attributed to low blood sugar but does not have diabetes.  Was in a bar at the time.  He states he has had heart flutters in the past, lasting a few seconds.   He denies any chest pain.  He reports shortness of breath with exertion.  Denies lower extremity edema.  Blood pressure elevated.      Fhx: Negative for heart disease.   Shx: Smoker, occasional etoh, states he is around drug use.     EKG 5/24/22 -  Sinus rhythm with premature atrial complexes.            4/21/22 -  sinus rhythm, QS wave in lead V1, poor r wave progression (V2), suspect anteroseptal myocardial infarction?, inverted T wave (V1,V2)  ECHO         Lab Results   Component Value Date     04/06/2022    K 3.8 04/06/2022     (H) 04/06/2022    CO2 29 04/06/2022    BUN 7 04/06/2022    CREATININE 0.99 04/06/2022    CALCIUM 8.9 04/06/2022    ANIONGAP 5 (L) 04/06/2022    ESTGFRAFRICA 94 08/17/2021    EGFRNONAA 83 04/06/2022       Lab Results   Component Value Date    CHOL 90 08/30/2022    CHOL 145 08/17/2021     Lab Results   Component Value Date    HDL 43 08/30/2022    HDL 33 (L) 08/17/2021     Lab Results   Component Value Date    LDLCALC 28 08/30/2022    LDLCALC 71 08/17/2021     Lab Results   Component Value Date    TRIG 96 08/30/2022    TRIG 206 (H) 08/17/2021     Lab Results   Component Value Date    CHOLHDL 2.1 08/30/2022    CHOLHDL 4.4 08/17/2021       Lab Results   Component Value Date    WBC 10.36 08/17/2021    HGB 13.8 08/17/2021    HCT 42.5 08/17/2021    MCV 93.4 08/17/2021     08/17/2021           Review of  "Systems   Respiratory:  Negative for cough and shortness of breath.    Cardiovascular:  Negative for chest pain, palpitations, orthopnea, claudication, leg swelling and PND.   Neurological:  Negative for loss of consciousness.       Objective:   /80   Pulse 72   Resp 18   Ht 6' 2" (1.88 m)   Wt 113.4 kg (250 lb)   BMI 32.10 kg/m²     Physical Exam  Cardiovascular:      Rate and Rhythm: Normal rate.      Pulses: Normal pulses.      Heart sounds: Normal heart sounds. No murmur heard.  Pulmonary:      Effort: Pulmonary effort is normal.      Breath sounds: Normal breath sounds.   Musculoskeletal:         General: No swelling.      Cervical back: Neck supple.   Skin:     Findings: No rash.   Neurological:      Mental Status: He is alert and oriented to person, place, and time.         Assessment:     1. Primary hypertension        2. Methamphetamine use              Plan:   Hypertension  Controlled 130/80  Patient discontinued to losartan   On coreg 6.25mg bid  Quit smoking and meth use     Methamphetamine use  UTOX +ve for amphetamine use    Syncope and collapse  Single episode; at a bar  [place] dizzy and passed out  Improved after eating something  ECHO pending     Mixed hyperlipidemia  Start crestor 40mg qd  ASCVD risk 14.9%     Smoker  Smoking counseling performed      "

## 2022-09-22 NOTE — ASSESSMENT & PLAN NOTE
Controlled 130/80  Patient discontinued to losartan   On coreg 6.25mg bid  Quit smoking and meth use

## 2022-09-29 ENCOUNTER — OFFICE VISIT (OUTPATIENT)
Dept: FAMILY MEDICINE | Facility: CLINIC | Age: 57
End: 2022-09-29
Payer: MEDICARE

## 2022-09-29 VITALS
TEMPERATURE: 97 F | DIASTOLIC BLOOD PRESSURE: 86 MMHG | SYSTOLIC BLOOD PRESSURE: 122 MMHG | RESPIRATION RATE: 20 BRPM | HEIGHT: 77 IN | BODY MASS INDEX: 30.03 KG/M2 | HEART RATE: 66 BPM | OXYGEN SATURATION: 98 % | WEIGHT: 254.38 LBS

## 2022-09-29 DIAGNOSIS — M51.36 DDD (DEGENERATIVE DISC DISEASE), LUMBAR: ICD-10-CM

## 2022-09-29 DIAGNOSIS — E78.5 HYPERLIPIDEMIA, UNSPECIFIED HYPERLIPIDEMIA TYPE: ICD-10-CM

## 2022-09-29 DIAGNOSIS — I10 PRIMARY HYPERTENSION: Primary | ICD-10-CM

## 2022-09-29 DIAGNOSIS — N52.9 ERECTILE DYSFUNCTION, UNSPECIFIED ERECTILE DYSFUNCTION TYPE: ICD-10-CM

## 2022-09-29 PROCEDURE — 99214 OFFICE O/P EST MOD 30 MIN: CPT | Mod: ,,, | Performed by: INTERNAL MEDICINE

## 2022-09-29 PROCEDURE — 3008F BODY MASS INDEX DOCD: CPT | Mod: ,,, | Performed by: INTERNAL MEDICINE

## 2022-09-29 PROCEDURE — 1160F PR REVIEW ALL MEDS BY PRESCRIBER/CLIN PHARMACIST DOCUMENTED: ICD-10-PCS | Mod: ,,, | Performed by: INTERNAL MEDICINE

## 2022-09-29 PROCEDURE — 3074F SYST BP LT 130 MM HG: CPT | Mod: ,,, | Performed by: INTERNAL MEDICINE

## 2022-09-29 PROCEDURE — 3079F DIAST BP 80-89 MM HG: CPT | Mod: ,,, | Performed by: INTERNAL MEDICINE

## 2022-09-29 PROCEDURE — 1159F PR MEDICATION LIST DOCUMENTED IN MEDICAL RECORD: ICD-10-PCS | Mod: ,,, | Performed by: INTERNAL MEDICINE

## 2022-09-29 PROCEDURE — 3044F HG A1C LEVEL LT 7.0%: CPT | Mod: ,,, | Performed by: INTERNAL MEDICINE

## 2022-09-29 PROCEDURE — 1159F MED LIST DOCD IN RCRD: CPT | Mod: ,,, | Performed by: INTERNAL MEDICINE

## 2022-09-29 PROCEDURE — 3008F PR BODY MASS INDEX (BMI) DOCUMENTED: ICD-10-PCS | Mod: ,,, | Performed by: INTERNAL MEDICINE

## 2022-09-29 PROCEDURE — 3079F PR MOST RECENT DIASTOLIC BLOOD PRESSURE 80-89 MM HG: ICD-10-PCS | Mod: ,,, | Performed by: INTERNAL MEDICINE

## 2022-09-29 PROCEDURE — 1160F RVW MEDS BY RX/DR IN RCRD: CPT | Mod: ,,, | Performed by: INTERNAL MEDICINE

## 2022-09-29 PROCEDURE — 4010F ACE/ARB THERAPY RXD/TAKEN: CPT | Mod: ,,, | Performed by: INTERNAL MEDICINE

## 2022-09-29 PROCEDURE — 3044F PR MOST RECENT HEMOGLOBIN A1C LEVEL <7.0%: ICD-10-PCS | Mod: ,,, | Performed by: INTERNAL MEDICINE

## 2022-09-29 PROCEDURE — 3074F PR MOST RECENT SYSTOLIC BLOOD PRESSURE < 130 MM HG: ICD-10-PCS | Mod: ,,, | Performed by: INTERNAL MEDICINE

## 2022-09-29 PROCEDURE — 99214 PR OFFICE/OUTPT VISIT, EST, LEVL IV, 30-39 MIN: ICD-10-PCS | Mod: ,,, | Performed by: INTERNAL MEDICINE

## 2022-09-29 PROCEDURE — 4010F PR ACE/ARB THEARPY RXD/TAKEN: ICD-10-PCS | Mod: ,,, | Performed by: INTERNAL MEDICINE

## 2022-09-29 NOTE — PATIENT INSTRUCTIONS
Hola was seen today for follow-up, medication problem, hypotension and back pain.    Diagnoses and all orders for this visit:    Primary hypertension    DDD (degenerative disc disease), lumbar    Erectile dysfunction, unspecified erectile dysfunction type    Hyperlipidemia, unspecified hyperlipidemia type    Other orders  The following orders have not been finalized:  -     sildenafiL (VIAGRA) 100 MG tablet

## 2022-09-29 NOTE — PROGRESS NOTES
Subjective:       Patient ID: Hola Burrell is a 57 y.o. male.    Chief Complaint: Follow-up, Medication Problem, Hypotension, and Back Pain    Patient is here today for check up evaluation. Patient blood pressure is stable today. Patient states he stopped taking the Losartan he was prescribed on previous visit just 2 days after due to making his blood pressure drop too low. Patient is currently on Coreg. Will continue to take that for now. Patient is also currently found to be taking Lyrica prescribed by Dr Foy for his chronic back pain. Will discontinue Gabapentin as he is on Lyrica. Patient recent labs reviewed and all within normal limits. Patient is requesting Viagra refill. Will refill today. Will follow in 3 months.     Current Medications:    Current Outpatient Medications:     cariprazine (VRAYLAR) 3 mg Cap, Take 3 mg by mouth once daily., Disp: , Rfl:     carvediloL (COREG) 6.25 MG tablet, Take 1 tablet (6.25 mg total) by mouth 2 (two) times daily with meals., Disp: 60 tablet, Rfl: 11    fluticasone propionate (FLONASE) 50 mcg/actuation nasal spray, 1 spray (50 mcg total) by Each Nostril route once daily., Disp: 16 g, Rfl: 2    pregabalin (LYRICA) 100 MG capsule, Take 1 capsule (100 mg total) by mouth 2 (two) times daily., Disp: 60 capsule, Rfl: 5    rosuvastatin (CRESTOR) 40 MG Tab, Take 1 tablet (40 mg total) by mouth every evening., Disp: 90 tablet, Rfl: 3    tamsulosin (FLOMAX) 0.4 mg Cap, Take 1 capsule (0.4 mg total) by mouth once daily., Disp: 90 capsule, Rfl: 1    Last Labs:     Office Visit on 08/30/2022   Component Date Value    Triglycerides 08/30/2022 96     Cholesterol 08/30/2022 90     HDL Cholesterol 08/30/2022 43     Cholesterol/HDL Ratio (R* 08/30/2022 2.1     Non-HDL 08/30/2022 47     LDL Calculated 08/30/2022 28     LDL/HDL 08/30/2022 0.7     VLDL 08/30/2022 19     HIV 1/2 08/30/2022 Non-Reactive     PSA Total 08/30/2022 1.170        Last Imaging:  MRI Lumbar Spine Without  Contrast  Narrative: EXAMINATION:  MRI LUMBAR SPINE WITHOUT CONTRAST    CLINICAL HISTORY:  Lumbar radiculopathy, symptoms persist with conservative treatment; Radiculopathy, lumbar region    TECHNIQUE:  Multiplanar, multisequence MRI of the lumbar spine performed without the administration of contrast.    COMPARISON:  Radiograph 04/04/2022    FINDINGS:  The leftward curvature of the lumbar spine is again seen but better depicted on the recent radiograph.  The sagittal alignment appears similar to the radiograph with a minimal degree of retrolisthesis of L4 on L5.  Degenerative endplate changes are seen throughout.  Conus terminates at L1.    L1-L2: Disc bulge and facet degeneration.  No spinal canal stenosis.  Mild bilateral foraminal stenosis.    L2-3: Disc bulge.  Facet degeneration.  Mild spinal canal and mild bilateral foraminal stenosis.    L3-4: Disc bulge and facet degeneration.  Mild spinal canal stenosis.  Moderate right and mild left foraminal stenosis.    L4-5: Disc bulge and facet degeneration.  Mild spinal canal stenosis.  Moderate bilateral foraminal stenosis.    L5-S1: Disc bulge and facet degeneration.  Mild spinal canal stenosis.  Moderate left and mild right foraminal stenosis.  Impression: Multilevel degenerative changes throughout the lumbar spine.    Electronically signed by: Alli Candelaria  Date:    08/03/2022  Time:    12:38         Review of Systems   All other systems reviewed and are negative.      Objective:      Physical Exam  Vitals reviewed.   Constitutional:       Appearance: Normal appearance.   Cardiovascular:      Rate and Rhythm: Normal rate and regular rhythm.      Pulses: Normal pulses.      Heart sounds: Normal heart sounds.   Pulmonary:      Effort: Pulmonary effort is normal.      Breath sounds: Normal breath sounds.   Abdominal:      General: Abdomen is flat. Bowel sounds are normal.      Palpations: Abdomen is soft.   Musculoskeletal:         General: Normal range of motion.       Cervical back: Normal range of motion and neck supple.   Skin:     General: Skin is warm and dry.   Neurological:      General: No focal deficit present.      Mental Status: He is alert and oriented to person, place, and time. Mental status is at baseline.       Assessment:       1. Primary hypertension        2. DDD (degenerative disc disease), lumbar        3. Erectile dysfunction, unspecified erectile dysfunction type        4. Hyperlipidemia, unspecified hyperlipidemia type             Plan:         Hola was seen today for follow-up, medication problem, hypotension and back pain.    Diagnoses and all orders for this visit:    Primary hypertension    DDD (degenerative disc disease), lumbar    Erectile dysfunction, unspecified erectile dysfunction type    Hyperlipidemia, unspecified hyperlipidemia type    Other orders  The following orders have not been finalized:  -     sildenafiL (VIAGRA) 100 MG tablet

## 2022-09-30 ENCOUNTER — OFFICE VISIT (OUTPATIENT)
Dept: DERMATOLOGY | Facility: CLINIC | Age: 57
End: 2022-09-30
Payer: MEDICARE

## 2022-09-30 DIAGNOSIS — L72.11 PILAR CYST: Primary | ICD-10-CM

## 2022-09-30 DIAGNOSIS — D22.9 SKIN MOLE: ICD-10-CM

## 2022-09-30 PROCEDURE — 99203 PR OFFICE/OUTPT VISIT, NEW, LEVL III, 30-44 MIN: ICD-10-PCS | Mod: ,,, | Performed by: STUDENT IN AN ORGANIZED HEALTH CARE EDUCATION/TRAINING PROGRAM

## 2022-09-30 PROCEDURE — 3044F PR MOST RECENT HEMOGLOBIN A1C LEVEL <7.0%: ICD-10-PCS | Mod: CPTII,,, | Performed by: STUDENT IN AN ORGANIZED HEALTH CARE EDUCATION/TRAINING PROGRAM

## 2022-09-30 PROCEDURE — 99203 OFFICE O/P NEW LOW 30 MIN: CPT | Mod: ,,, | Performed by: STUDENT IN AN ORGANIZED HEALTH CARE EDUCATION/TRAINING PROGRAM

## 2022-09-30 PROCEDURE — 4010F ACE/ARB THERAPY RXD/TAKEN: CPT | Mod: CPTII,,, | Performed by: STUDENT IN AN ORGANIZED HEALTH CARE EDUCATION/TRAINING PROGRAM

## 2022-09-30 PROCEDURE — 3044F HG A1C LEVEL LT 7.0%: CPT | Mod: CPTII,,, | Performed by: STUDENT IN AN ORGANIZED HEALTH CARE EDUCATION/TRAINING PROGRAM

## 2022-09-30 PROCEDURE — 4010F PR ACE/ARB THEARPY RXD/TAKEN: ICD-10-PCS | Mod: CPTII,,, | Performed by: STUDENT IN AN ORGANIZED HEALTH CARE EDUCATION/TRAINING PROGRAM

## 2022-09-30 RX ORDER — SILDENAFIL 100 MG/1
100 TABLET, FILM COATED ORAL DAILY PRN
Qty: 10 TABLET | Refills: 3 | Status: SHIPPED | OUTPATIENT
Start: 2022-09-30 | End: 2023-10-26 | Stop reason: SDUPTHER

## 2022-09-30 NOTE — PROGRESS NOTES
[facility]  Arthur Cochran MD    [address], [place] [address]  [phone]    Fax: [phone]    Patient Name: Hola Burrell  Medical Record Number: [id number]  PCP: Kali Yañez MD  Age: 57 y.o. : 1965  Contact: [phone] (home)     CC: mole on scalp  History of Present Illness:     Hola Burrell is a 57 y.o.  male with no history of skin cancer who presents to clinic today for nodule on scalp.  This has been present for years but seems to have grown. It has never drained. Previous treatments include none. Other concerns today are none      The patient has no other concerns today.    Review of Systems:     Unremarkable other than mentioned above.     Physical Exam:     General: Relaxed, oriented, alert    Skin examination of the scalp, face, neck, chest, back, abdomen, upper extremities and lower extremities were normal except for as listed below      Assessment and Plan:     1. Pilar Cyst vs lipoma  - firm subcutaneous cyst(s) located on the vertex scalp.  - Patient was educated on the benign nature of these.   - can schedule excision if patient desires       Return to clinic for excision     AVS printed with patient instructions     Arthur Cochran MD   Mohs Surgery/Dermatologic Oncology  Dermatology

## 2022-10-13 ENCOUNTER — PROCEDURE VISIT (OUTPATIENT)
Dept: DERMATOLOGY | Facility: CLINIC | Age: 57
End: 2022-10-13
Payer: MEDICARE

## 2022-10-13 VITALS — HEART RATE: 88 BPM | DIASTOLIC BLOOD PRESSURE: 88 MMHG | SYSTOLIC BLOOD PRESSURE: 127 MMHG

## 2022-10-13 DIAGNOSIS — D48.9 NEOPLASM OF UNCERTAIN BEHAVIOR: Primary | ICD-10-CM

## 2022-10-13 PROCEDURE — 88304 PATHOLOGY, DERMATOLOGY: ICD-10-PCS | Mod: 26,,, | Performed by: PATHOLOGY

## 2022-10-13 PROCEDURE — 11426 EXC H-F-NK-SP B9+MARG >4 CM: CPT | Mod: ,,, | Performed by: STUDENT IN AN ORGANIZED HEALTH CARE EDUCATION/TRAINING PROGRAM

## 2022-10-13 PROCEDURE — 12032 PR LAYR CLOS WND TRUNK,ARM,LEG 2.6-7.5 CM: ICD-10-PCS | Mod: 51,,, | Performed by: STUDENT IN AN ORGANIZED HEALTH CARE EDUCATION/TRAINING PROGRAM

## 2022-10-13 PROCEDURE — 99499 UNLISTED E&M SERVICE: CPT | Mod: ,,, | Performed by: STUDENT IN AN ORGANIZED HEALTH CARE EDUCATION/TRAINING PROGRAM

## 2022-10-13 PROCEDURE — 99499 NO LOS: ICD-10-PCS | Mod: ,,, | Performed by: STUDENT IN AN ORGANIZED HEALTH CARE EDUCATION/TRAINING PROGRAM

## 2022-10-13 PROCEDURE — 11426 PR EXC SKIN BENIG >4 CM REMAINDR BODY: ICD-10-PCS | Mod: ,,, | Performed by: STUDENT IN AN ORGANIZED HEALTH CARE EDUCATION/TRAINING PROGRAM

## 2022-10-13 PROCEDURE — 12032 INTMD RPR S/A/T/EXT 2.6-7.5: CPT | Mod: 51,,, | Performed by: STUDENT IN AN ORGANIZED HEALTH CARE EDUCATION/TRAINING PROGRAM

## 2022-10-13 PROCEDURE — 88304 TISSUE EXAM BY PATHOLOGIST: CPT | Mod: SUR | Performed by: STUDENT IN AN ORGANIZED HEALTH CARE EDUCATION/TRAINING PROGRAM

## 2022-10-13 PROCEDURE — 88304 TISSUE EXAM BY PATHOLOGIST: CPT | Mod: 26,,, | Performed by: PATHOLOGY

## 2022-10-13 NOTE — PROGRESS NOTES
Excision Consult Note    Hola Burrell is a 57 y.o. male who is referred by Dr. Cochran for evaluation of a Pilar cyst vs lipoma on the scalp. It has grown and become painful.         Preoperative Risk Factors:  Current Anticoagulants: No  Endocarditis / Rheumatic Fever hx: No  Immunocompromised: No  Prosthetic joint: No  Congenital heart defect: No  Prosthetic heart valve: No  Diabetic: No  Transplant: No  Pacemaker: No  Defibrillator:  No  Prior problem with local anesthesia: No  Tobacco History: Yes]  Clindamycin Allergy: No  Pregnant: no     Transmissible Diseases:  HIV No  Hepatitis B or C  No      Exam:  Limited skin exam is normal except for a pilar cyst  located on the vertex scalp  .    Pathologic Findings:  Accession # n/[id number]  Diagnosis: pilar cyst     Assessment and Plan:  Treatment Options : Given the indications and high cure rate, the patient has agreed to proceed with excision  Risks and Benefits : The rationale for excision was explained to the patient. The risks and benefits to therapy were discussed in detail. Specifically, the risks of infection, scarring, bleeding, dehiscence, hematoma, prolonged wound healing, incomplete removal, allergy to anesthesia, nerve injury, inability to clear the lesion and recurrence were addressed. The treatment site was clearly identified and confirmed by the patient.    Plan:  Excision    Arthur Cochran MD   Mohs Surgery/Dermatologic Oncology

## 2022-10-13 NOTE — PATIENT INSTRUCTIONS

## 2022-10-13 NOTE — PROGRESS NOTES
[facility]    Arthur Cochran MD    Elliptical Excision with Intermediate Closure    Tumor Type: Pilar cyst  Location:  Scalp  Derm-Path Accession #:  n/[id number]  Lesion Size:  4.5 x 4.0 cm   Surgical Margins: 0.5 cm  Post op size: 2.3 x 2.0 cm   Level of Defect:  fat  Repair Type:  Intermediate linear   Repair Length:  3.5 cm  Sutures: 5-0 prolene, 4-0 monocryl    Primary Surgeon: JOSE Cochran MD      INDICATIONS:  The risks of bleeding, infection, discomfort, incomplete removal, and scar formation were explained to the patient.  All questions were answered.  After informed consent, confirmation of site and identity, and appropriate instructions, the patient underwent the procedure as follows:    PROCEDURE:  With the patient in a supine position, the lesion was outlined with the above margins. An ellipse was designed around the lesion to conform to relaxed skin tension lines in an effort to minimize scarring and deformity.  The patient was then placed in a supine position.  The lesion and surrounding skin were prepped with chlorhexidine, draped, and anesthetized with 1% lidocaine with epinephrine 1:100,100 buffered with 1:10 sodium bicarbonate.  Using a #15 blade, the skin was excised along premarked lines.  The resulting defect extended through deep subcutaneous tissue.  Wound margins were undermined to limit functional deformity/impairment of adjacent structures.  Bleeding vessels were controlled with  monopolar  electrodessication .  The dermis and subcutaneous tissue were closed with buried vertical mattress sutures.  Epidermal approximation was meticulously refined with simple running sutures, resulting in a linear closure with little to no wound tension.  Blood loss was estimated to be less than 5cc.  The area was coated with petrolatum and covered with a non-adherent dressing followed by gauze and tape.  Postoperative instructions were reviewed per protocol.  The patient left alert and fully  oriented.                  Arthur Cochran MD

## 2022-10-17 LAB
ESTROGEN SERPL-MCNC: NORMAL PG/ML
INSULIN SERPL-ACNC: NORMAL U[IU]/ML
LAB AP GROSS DESCRIPTION: NORMAL
LAB AP LABORATORY NOTES: NORMAL
LAB AP SPEC A DDX: NORMAL
LAB AP SPEC A MORPHOLOGY: NORMAL
LAB AP SPEC A PROCEDURE: NORMAL
T3RU NFR SERPL: NORMAL %

## 2022-10-24 ENCOUNTER — CLINICAL SUPPORT (OUTPATIENT)
Dept: DERMATOLOGY | Facility: CLINIC | Age: 57
End: 2022-10-24
Payer: MEDICARE

## 2022-10-24 DIAGNOSIS — Z48.02 ENCOUNTER FOR REMOVAL OF SUTURES: Primary | ICD-10-CM

## 2022-10-24 PROCEDURE — 99024 POSTOP FOLLOW-UP VISIT: CPT | Mod: ,,, | Performed by: STUDENT IN AN ORGANIZED HEALTH CARE EDUCATION/TRAINING PROGRAM

## 2022-10-24 PROCEDURE — 99024 PR POST-OP FOLLOW-UP VISIT: ICD-10-PCS | Mod: ,,, | Performed by: STUDENT IN AN ORGANIZED HEALTH CARE EDUCATION/TRAINING PROGRAM

## 2022-10-24 NOTE — PROGRESS NOTES
Elliptical Excision with Intermediate Closure     Tumor Type: Pilar cyst  Location:  Scalp  Derm-Path Accession #:  n/[id number]  Lesion Size:  4.5 x 4.0 cm   Surgical Margins: 0.5 cm  Post op size: 2.3 x 2.0 cm   Level of Defect:  fat  Repair Type:  Intermediate linear   Repair Length:  3.5 cm  Sutures: 5-0 prolene, 4-0 monocryl     Primary Surgeon: JOSE Cochran MD    No S/S of infection.    Sutures removed.   No follow ups.   Khushboo Denson, CMA

## 2022-11-09 DIAGNOSIS — Z71.89 COMPLEX CARE COORDINATION: ICD-10-CM

## 2022-12-29 ENCOUNTER — OFFICE VISIT (OUTPATIENT)
Dept: FAMILY MEDICINE | Facility: CLINIC | Age: 57
End: 2022-12-29
Payer: MEDICARE

## 2022-12-29 VITALS
HEART RATE: 74 BPM | RESPIRATION RATE: 18 BRPM | OXYGEN SATURATION: 97 % | SYSTOLIC BLOOD PRESSURE: 120 MMHG | TEMPERATURE: 98 F | DIASTOLIC BLOOD PRESSURE: 60 MMHG | HEIGHT: 77 IN | BODY MASS INDEX: 30.13 KG/M2 | WEIGHT: 255.19 LBS

## 2022-12-29 DIAGNOSIS — E78.00 ELEVATED CHOLESTEROL: ICD-10-CM

## 2022-12-29 DIAGNOSIS — M54.50 LOW BACK PAIN, UNSPECIFIED BACK PAIN LATERALITY, UNSPECIFIED CHRONICITY, UNSPECIFIED WHETHER SCIATICA PRESENT: Primary | ICD-10-CM

## 2022-12-29 DIAGNOSIS — N40.0 BENIGN PROSTATIC HYPERPLASIA, UNSPECIFIED WHETHER LOWER URINARY TRACT SYMPTOMS PRESENT: ICD-10-CM

## 2022-12-29 PROCEDURE — 99214 PR OFFICE/OUTPT VISIT, EST, LEVL IV, 30-39 MIN: ICD-10-PCS | Mod: ,,, | Performed by: INTERNAL MEDICINE

## 2022-12-29 PROCEDURE — 3078F DIAST BP <80 MM HG: CPT | Mod: ,,, | Performed by: INTERNAL MEDICINE

## 2022-12-29 PROCEDURE — 1159F MED LIST DOCD IN RCRD: CPT | Mod: ,,, | Performed by: INTERNAL MEDICINE

## 2022-12-29 PROCEDURE — 3008F BODY MASS INDEX DOCD: CPT | Mod: ,,, | Performed by: INTERNAL MEDICINE

## 2022-12-29 PROCEDURE — 99214 OFFICE O/P EST MOD 30 MIN: CPT | Mod: ,,, | Performed by: INTERNAL MEDICINE

## 2022-12-29 PROCEDURE — 1160F RVW MEDS BY RX/DR IN RCRD: CPT | Mod: ,,, | Performed by: INTERNAL MEDICINE

## 2022-12-29 PROCEDURE — 1160F PR REVIEW ALL MEDS BY PRESCRIBER/CLIN PHARMACIST DOCUMENTED: ICD-10-PCS | Mod: ,,, | Performed by: INTERNAL MEDICINE

## 2022-12-29 PROCEDURE — 3078F PR MOST RECENT DIASTOLIC BLOOD PRESSURE < 80 MM HG: ICD-10-PCS | Mod: ,,, | Performed by: INTERNAL MEDICINE

## 2022-12-29 PROCEDURE — 3074F PR MOST RECENT SYSTOLIC BLOOD PRESSURE < 130 MM HG: ICD-10-PCS | Mod: ,,, | Performed by: INTERNAL MEDICINE

## 2022-12-29 PROCEDURE — 3074F SYST BP LT 130 MM HG: CPT | Mod: ,,, | Performed by: INTERNAL MEDICINE

## 2022-12-29 PROCEDURE — 1159F PR MEDICATION LIST DOCUMENTED IN MEDICAL RECORD: ICD-10-PCS | Mod: ,,, | Performed by: INTERNAL MEDICINE

## 2022-12-29 PROCEDURE — 3008F PR BODY MASS INDEX (BMI) DOCUMENTED: ICD-10-PCS | Mod: ,,, | Performed by: INTERNAL MEDICINE

## 2022-12-29 NOTE — PROGRESS NOTES
Subjective:       Patient ID: Hola Burrell is a 57 y.o. male.    Chief Complaint: Hypertension and Medication Refill    Patient is here today for a follow up evaluation. Patient blood pressure is stable today on intake, 120/60. Patient is requesting new Ortho Spine for lower back pain. Patient states  does not accept [employer]. Will refer to [facility]. Patient states Flomax has not helped his urinary frequency. Will increase Flomax dosage. Will order Flomax 0.8mg PO QH #90. Patient is requesting medication refills. Will refill today. Will follow with patient in 1 month.     Current Medications:    Current Outpatient Medications:     cariprazine (VRAYLAR) 3 mg Cap, Take 3 mg by mouth once daily., Disp: , Rfl:     carvediloL (COREG) 6.25 MG tablet, Take 1 tablet (6.25 mg total) by mouth 2 (two) times daily with meals., Disp: 60 tablet, Rfl: 11    fluticasone propionate (FLONASE) 50 mcg/actuation nasal spray, 1 spray (50 mcg total) by Each Nostril route once daily., Disp: 16 g, Rfl: 2    pregabalin (LYRICA) 100 MG capsule, Take 1 capsule (100 mg total) by mouth 2 (two) times daily., Disp: 60 capsule, Rfl: 5    rosuvastatin (CRESTOR) 40 MG Tab, Take 1 tablet (40 mg total) by mouth every evening., Disp: 90 tablet, Rfl: 3    sildenafiL (VIAGRA) 100 MG tablet, Take 1 tablet (100 mg total) by mouth daily as needed for Erectile Dysfunction., Disp: 10 tablet, Rfl: 3    tamsulosin (FLOMAX) 0.4 mg Cap, Take 1 capsule (0.4 mg total) by mouth once daily., Disp: 90 capsule, Rfl: 1    Last Labs:     No visits with results within 1 Month(s) from this visit.   Latest known visit with results is:   Procedure visit on 10/13/2022   Component Date Value    Final Diagnosis 10/13/2022                      Value:This result contains rich text formatting which cannot be displayed here.    Specimen A Procedure 10/13/2022 Excision     Specimen A Morphology 10/13/2022 Subcutaneous nodule      Specimen A DDx 10/13/2022 Pilar cyst vs lipoma     Microscopic Description 10/13/2022                      Value:This result contains rich text formatting which cannot be displayed here.    Gross Description 10/13/2022                      Value:This result contains rich text formatting which cannot be displayed here.    Laboratory Notes 10/13/2022                      Value:This result contains rich text formatting which cannot be displayed here.    Case Report 10/13/2022                      Value:Surgical Pathology                                Case: [id number]                                   Authorizing Provider:  Palmira Cochran MD        Collected:           10/13/2022 10:06 AM          Ordering Location:     [facility] Received:            10/14/2022 08:33 AM          Pathologist:           Alberto Morris III, MD                                                                           Specimen:    Scalp, vertex                                                                                 Last Imaging:  MRI Lumbar Spine Without Contrast  Narrative: EXAMINATION:  MRI LUMBAR SPINE WITHOUT CONTRAST    CLINICAL HISTORY:  Lumbar radiculopathy, symptoms persist with conservative treatment; Radiculopathy, lumbar region    TECHNIQUE:  Multiplanar, multisequence MRI of the lumbar spine performed without the administration of contrast.    COMPARISON:  Radiograph 04/04/2022    FINDINGS:  The leftward curvature of the lumbar spine is again seen but better depicted on the recent radiograph.  The sagittal alignment appears similar to the radiograph with a minimal degree of retrolisthesis of L4 on L5.  Degenerative endplate changes are seen throughout.  Conus terminates at L1.    L1-L2: Disc bulge and facet degeneration.  No spinal canal stenosis.  Mild bilateral foraminal stenosis.    L2-3: Disc bulge.  Facet degeneration.   Mild spinal canal and mild bilateral foraminal stenosis.    L3-4: Disc bulge and facet degeneration.  Mild spinal canal stenosis.  Moderate right and mild left foraminal stenosis.    L4-5: Disc bulge and facet degeneration.  Mild spinal canal stenosis.  Moderate bilateral foraminal stenosis.    L5-S1: Disc bulge and facet degeneration.  Mild spinal canal stenosis.  Moderate left and mild right foraminal stenosis.  Impression: Multilevel degenerative changes throughout the lumbar spine.    Electronically signed by: Alli Candelaria  Date:    08/03/2022  Time:    12:38         Review of Systems   All other systems reviewed and are negative.      Objective:      Physical Exam  Constitutional:       Appearance: Normal appearance. He is normal weight.   Cardiovascular:      Rate and Rhythm: Normal rate and regular rhythm.      Pulses: Normal pulses.      Heart sounds: Normal heart sounds.   Pulmonary:      Effort: Pulmonary effort is normal.      Breath sounds: Normal breath sounds.   Abdominal:      General: Abdomen is flat. Bowel sounds are normal.      Palpations: Abdomen is soft.   Musculoskeletal:         General: Normal range of motion.      Cervical back: Normal range of motion and neck supple.   Skin:     General: Skin is warm and dry.   Neurological:      General: No focal deficit present.      Mental Status: He is alert and oriented to person, place, and time. Mental status is at baseline.       Assessment:       1. Low back pain, unspecified back pain laterality, unspecified chronicity, unspecified whether sciatica present  Ambulatory referral/consult to [facility]      2. Elevated cholesterol        3. Benign prostatic hyperplasia, unspecified whether lower urinary tract symptoms present             Plan:         Hola was seen today for hypertension and medication refill.    Diagnoses and all orders for this visit:    Low back pain, unspecified back pain laterality, unspecified chronicity, unspecified  whether sciatica present  -     Ambulatory referral/consult to [facility]; Future    Elevated cholesterol    Benign prostatic hyperplasia, unspecified whether lower urinary tract symptoms present    Other orders  The following orders have not been finalized:  -     rosuvastatin (CRESTOR) 40 MG Tab  -     tamsulosin (FLOMAX) 0.4 mg Cap  -     carvediloL (COREG) 6.25 MG tablet

## 2022-12-29 NOTE — PATIENT INSTRUCTIONS
Hola was seen today for hypertension and medication refill.    Diagnoses and all orders for this visit:    Low back pain, unspecified back pain laterality, unspecified chronicity, unspecified whether sciatica present  -     Ambulatory referral/consult to [facility]; Future    Elevated cholesterol    Benign prostatic hyperplasia, unspecified whether lower urinary tract symptoms present    Other orders  The following orders have not been finalized:  -     rosuvastatin (CRESTOR) 40 MG Tab  -     tamsulosin (FLOMAX) 0.4 mg Cap  -     carvediloL (COREG) 6.25 MG tablet

## 2023-01-03 RX ORDER — CARVEDILOL 6.25 MG/1
6.25 TABLET ORAL 2 TIMES DAILY WITH MEALS
Qty: 60 TABLET | Refills: 11 | Status: SHIPPED | OUTPATIENT
Start: 2023-01-03 | End: 2023-08-03 | Stop reason: SDUPTHER

## 2023-01-03 RX ORDER — TAMSULOSIN HYDROCHLORIDE 0.4 MG/1
0.8 CAPSULE ORAL DAILY
Qty: 90 CAPSULE | Refills: 1 | Status: SHIPPED | OUTPATIENT
Start: 2023-01-03 | End: 2023-05-01 | Stop reason: SDUPTHER

## 2023-01-03 RX ORDER — ROSUVASTATIN CALCIUM 40 MG/1
40 TABLET, COATED ORAL NIGHTLY
Qty: 90 TABLET | Refills: 3 | Status: SHIPPED | OUTPATIENT
Start: 2023-01-03 | End: 2023-05-01 | Stop reason: SDUPTHER

## 2023-01-30 ENCOUNTER — OFFICE VISIT (OUTPATIENT)
Dept: FAMILY MEDICINE | Facility: CLINIC | Age: 58
End: 2023-01-30
Payer: MEDICARE

## 2023-01-30 VITALS
BODY MASS INDEX: 30.3 KG/M2 | TEMPERATURE: 98 F | DIASTOLIC BLOOD PRESSURE: 89 MMHG | SYSTOLIC BLOOD PRESSURE: 134 MMHG | RESPIRATION RATE: 18 BRPM | WEIGHT: 256.63 LBS | HEART RATE: 81 BPM | OXYGEN SATURATION: 99 % | HEIGHT: 77 IN

## 2023-01-30 DIAGNOSIS — M54.50 LOW BACK PAIN, UNSPECIFIED BACK PAIN LATERALITY, UNSPECIFIED CHRONICITY, UNSPECIFIED WHETHER SCIATICA PRESENT: Primary | ICD-10-CM

## 2023-01-30 PROCEDURE — 3008F BODY MASS INDEX DOCD: CPT | Mod: ,,, | Performed by: INTERNAL MEDICINE

## 2023-01-30 PROCEDURE — 1159F MED LIST DOCD IN RCRD: CPT | Mod: ,,, | Performed by: INTERNAL MEDICINE

## 2023-01-30 PROCEDURE — 99214 OFFICE O/P EST MOD 30 MIN: CPT | Mod: ,,, | Performed by: INTERNAL MEDICINE

## 2023-01-30 PROCEDURE — 3075F PR MOST RECENT SYSTOLIC BLOOD PRESS GE 130-139MM HG: ICD-10-PCS | Mod: ,,, | Performed by: INTERNAL MEDICINE

## 2023-01-30 PROCEDURE — 1160F PR REVIEW ALL MEDS BY PRESCRIBER/CLIN PHARMACIST DOCUMENTED: ICD-10-PCS | Mod: ,,, | Performed by: INTERNAL MEDICINE

## 2023-01-30 PROCEDURE — 3008F PR BODY MASS INDEX (BMI) DOCUMENTED: ICD-10-PCS | Mod: ,,, | Performed by: INTERNAL MEDICINE

## 2023-01-30 PROCEDURE — 3079F PR MOST RECENT DIASTOLIC BLOOD PRESSURE 80-89 MM HG: ICD-10-PCS | Mod: ,,, | Performed by: INTERNAL MEDICINE

## 2023-01-30 PROCEDURE — 3079F DIAST BP 80-89 MM HG: CPT | Mod: ,,, | Performed by: INTERNAL MEDICINE

## 2023-01-30 PROCEDURE — 3075F SYST BP GE 130 - 139MM HG: CPT | Mod: ,,, | Performed by: INTERNAL MEDICINE

## 2023-01-30 PROCEDURE — 1159F PR MEDICATION LIST DOCUMENTED IN MEDICAL RECORD: ICD-10-PCS | Mod: ,,, | Performed by: INTERNAL MEDICINE

## 2023-01-30 PROCEDURE — 99214 PR OFFICE/OUTPT VISIT, EST, LEVL IV, 30-39 MIN: ICD-10-PCS | Mod: ,,, | Performed by: INTERNAL MEDICINE

## 2023-01-30 PROCEDURE — 1160F RVW MEDS BY RX/DR IN RCRD: CPT | Mod: ,,, | Performed by: INTERNAL MEDICINE

## 2023-01-30 RX ORDER — NAPROXEN 500 MG/1
500 TABLET ORAL 2 TIMES DAILY PRN
Qty: 20 TABLET | Refills: 0 | Status: SHIPPED | OUTPATIENT
Start: 2023-01-30 | End: 2023-02-01 | Stop reason: SDUPTHER

## 2023-01-30 NOTE — PROGRESS NOTES
Subjective:       Patient ID: Hola Burrell is a 58 y.o. male.    Chief Complaint: Back Pain    HPI  .  Patient complains of moderate low back pain.  He rates it a 6/10.  Patient referred to ortho spine but he states that the insurance company does not cover him with Dr. Foy.  At this point I am going to refer him to pain management.  Will also write Naprosyn.  Current Medications:    Current Outpatient Medications:     cariprazine (VRAYLAR) 3 mg Cap, Take 3 mg by mouth once daily., Disp: , Rfl:     carvediloL (COREG) 6.25 MG tablet, Take 1 tablet (6.25 mg total) by mouth 2 (two) times daily with meals., Disp: 60 tablet, Rfl: 11    fluticasone propionate (FLONASE) 50 mcg/actuation nasal spray, 1 spray (50 mcg total) by Each Nostril route once daily., Disp: 16 g, Rfl: 2    pregabalin (LYRICA) 100 MG capsule, Take 1 capsule (100 mg total) by mouth 2 (two) times daily., Disp: 60 capsule, Rfl: 5    rosuvastatin (CRESTOR) 40 MG Tab, Take 1 tablet (40 mg total) by mouth every evening., Disp: 90 tablet, Rfl: 3    sildenafiL (VIAGRA) 100 MG tablet, Take 1 tablet (100 mg total) by mouth daily as needed for Erectile Dysfunction., Disp: 10 tablet, Rfl: 3    tamsulosin (FLOMAX) 0.4 mg Cap, Take 2 capsules (0.8 mg total) by mouth once daily., Disp: 90 capsule, Rfl: 1           Review of Systems   Constitutional:  Negative for appetite change and fatigue.   HENT:  Negative for nasal congestion and rhinorrhea.    Eyes:  Negative for redness and visual disturbance.   Respiratory:  Negative for cough and shortness of breath.    Cardiovascular:  Negative for chest pain and leg swelling.   Gastrointestinal:  Negative for abdominal pain, constipation and diarrhea.   Endocrine: Negative for polyuria.   Genitourinary:  Negative for difficulty urinating, dysuria and erectile dysfunction.   Musculoskeletal:  Negative for arthralgias and myalgias.   Integumentary:  Negative for rash and mole/lesion.   All other systems reviewed and  "are negative.             Vitals:    01/30/23 1055   BP: 134/89   BP Location: Right arm   Pulse: 81   Resp: 18   Temp: 97.8 °F (36.6 °C)   SpO2: 99%   Weight: 116.4 kg (256 lb 9.6 oz)   Height: 6' 4.5" (1.943 m)        Physical Exam  Vitals and nursing note reviewed.   Constitutional:       Appearance: Normal appearance.   Cardiovascular:      Rate and Rhythm: Normal rate and regular rhythm.      Pulses: Normal pulses.      Heart sounds: Normal heart sounds.   Pulmonary:      Effort: Pulmonary effort is normal.      Breath sounds: Normal breath sounds.   Abdominal:      General: Abdomen is flat. Bowel sounds are normal.      Palpations: Abdomen is soft.   Musculoskeletal:         General: Normal range of motion.   Skin:     General: Skin is warm and dry.   Neurological:      General: No focal deficit present.      Mental Status: He is alert and oriented to person, place, and time. Mental status is at baseline.         Last Labs:     No visits with results within 1 Month(s) from this visit.   Latest known visit with results is:   Procedure visit on 10/13/2022   Component Date Value    Final Diagnosis 10/13/2022                      Value:This result contains rich text formatting which cannot be displayed here.    Specimen A Procedure 10/13/2022 Excision     Specimen A Morphology 10/13/2022 Subcutaneous nodule     Specimen A DDx 10/13/2022 Pilar cyst vs lipoma     Microscopic Description 10/13/2022                      Value:This result contains rich text formatting which cannot be displayed here.    Gross Description 10/13/2022                      Value:This result contains rich text formatting which cannot be displayed here.    Laboratory Notes 10/13/2022                      Value:This result contains rich text formatting which cannot be displayed here.    Case Report 10/13/2022                      Value:Surgical Pathology                                Case: [id number]                                 "   Authorizing Provider:  Palmira Cochran MD        Collected:           10/13/2022 10:06 AM          Ordering Location:     [facility] Received:            10/14/2022 08:33 AM          Pathologist:           Alberto Morris III, MD                                                                           Specimen:    Scalp, vertex                                                                                 Last Imaging:  MRI Lumbar Spine Without Contrast  Narrative: EXAMINATION:  MRI LUMBAR SPINE WITHOUT CONTRAST    CLINICAL HISTORY:  Lumbar radiculopathy, symptoms persist with conservative treatment; Radiculopathy, lumbar region    TECHNIQUE:  Multiplanar, multisequence MRI of the lumbar spine performed without the administration of contrast.    COMPARISON:  Radiograph 04/04/2022    FINDINGS:  The leftward curvature of the lumbar spine is again seen but better depicted on the recent radiograph.  The sagittal alignment appears similar to the radiograph with a minimal degree of retrolisthesis of L4 on L5.  Degenerative endplate changes are seen throughout.  Conus terminates at L1.    L1-L2: Disc bulge and facet degeneration.  No spinal canal stenosis.  Mild bilateral foraminal stenosis.    L2-3: Disc bulge.  Facet degeneration.  Mild spinal canal and mild bilateral foraminal stenosis.    L3-4: Disc bulge and facet degeneration.  Mild spinal canal stenosis.  Moderate right and mild left foraminal stenosis.    L4-5: Disc bulge and facet degeneration.  Mild spinal canal stenosis.  Moderate bilateral foraminal stenosis.    L5-S1: Disc bulge and facet degeneration.  Mild spinal canal stenosis.  Moderate left and mild right foraminal stenosis.  Impression: Multilevel degenerative changes throughout the lumbar spine.    Electronically signed by: Alli Candelaria  Date:    08/03/2022  Time:    12:38             Objective:         Assessment:       1. Low back pain, unspecified back pain laterality, unspecified chronicity, unspecified whether sciatica present  Ambulatory referral/consult to Pain Clinic         Naprosyn 500 mg 1 p.o. b.i.d.  Refer to pain management  Plan:         Hola was seen today for back pain.    Diagnoses and all orders for this visit:    Low back pain, unspecified back pain laterality, unspecified chronicity, unspecified whether sciatica present  -     Ambulatory referral/consult to Pain Clinic; Future

## 2023-02-01 NOTE — TELEPHONE ENCOUNTER
Pt came into facility stating his pharmacy did not receive his rx of his med from visit on 1/30/23; informed him naproxen was sent in on that to [facility] [address]. Pt stated he wanted it sent to [facility]. New order sent in for this med to the correct pharmacy.

## 2023-02-04 RX ORDER — NAPROXEN 500 MG/1
500 TABLET ORAL 2 TIMES DAILY PRN
Qty: 20 TABLET | Refills: 0 | Status: SHIPPED | OUTPATIENT
Start: 2023-02-04 | End: 2023-09-05 | Stop reason: SDUPTHER

## 2023-05-01 ENCOUNTER — OFFICE VISIT (OUTPATIENT)
Dept: FAMILY MEDICINE | Facility: CLINIC | Age: 58
End: 2023-05-01
Payer: MEDICARE

## 2023-05-01 VITALS
TEMPERATURE: 98 F | SYSTOLIC BLOOD PRESSURE: 132 MMHG | HEIGHT: 76 IN | WEIGHT: 254.19 LBS | OXYGEN SATURATION: 99 % | RESPIRATION RATE: 18 BRPM | HEART RATE: 72 BPM | BODY MASS INDEX: 30.95 KG/M2 | DIASTOLIC BLOOD PRESSURE: 86 MMHG

## 2023-05-01 DIAGNOSIS — E78.5 DYSLIPIDEMIA: Chronic | ICD-10-CM

## 2023-05-01 DIAGNOSIS — M54.50 LOW BACK PAIN, UNSPECIFIED BACK PAIN LATERALITY, UNSPECIFIED CHRONICITY, UNSPECIFIED WHETHER SCIATICA PRESENT: Primary | Chronic | ICD-10-CM

## 2023-05-01 PROCEDURE — 1160F RVW MEDS BY RX/DR IN RCRD: CPT | Mod: ,,, | Performed by: INTERNAL MEDICINE

## 2023-05-01 PROCEDURE — 3075F SYST BP GE 130 - 139MM HG: CPT | Mod: ,,, | Performed by: INTERNAL MEDICINE

## 2023-05-01 PROCEDURE — 1159F PR MEDICATION LIST DOCUMENTED IN MEDICAL RECORD: ICD-10-PCS | Mod: ,,, | Performed by: INTERNAL MEDICINE

## 2023-05-01 PROCEDURE — 99214 PR OFFICE/OUTPT VISIT, EST, LEVL IV, 30-39 MIN: ICD-10-PCS | Mod: ,,, | Performed by: INTERNAL MEDICINE

## 2023-05-01 PROCEDURE — 3079F PR MOST RECENT DIASTOLIC BLOOD PRESSURE 80-89 MM HG: ICD-10-PCS | Mod: ,,, | Performed by: INTERNAL MEDICINE

## 2023-05-01 PROCEDURE — 99214 OFFICE O/P EST MOD 30 MIN: CPT | Mod: ,,, | Performed by: INTERNAL MEDICINE

## 2023-05-01 PROCEDURE — 3079F DIAST BP 80-89 MM HG: CPT | Mod: ,,, | Performed by: INTERNAL MEDICINE

## 2023-05-01 PROCEDURE — 1160F PR REVIEW ALL MEDS BY PRESCRIBER/CLIN PHARMACIST DOCUMENTED: ICD-10-PCS | Mod: ,,, | Performed by: INTERNAL MEDICINE

## 2023-05-01 PROCEDURE — 3075F PR MOST RECENT SYSTOLIC BLOOD PRESS GE 130-139MM HG: ICD-10-PCS | Mod: ,,, | Performed by: INTERNAL MEDICINE

## 2023-05-01 PROCEDURE — 3008F PR BODY MASS INDEX (BMI) DOCUMENTED: ICD-10-PCS | Mod: ,,, | Performed by: INTERNAL MEDICINE

## 2023-05-01 PROCEDURE — 1159F MED LIST DOCD IN RCRD: CPT | Mod: ,,, | Performed by: INTERNAL MEDICINE

## 2023-05-01 PROCEDURE — 3008F BODY MASS INDEX DOCD: CPT | Mod: ,,, | Performed by: INTERNAL MEDICINE

## 2023-05-01 RX ORDER — FLUTICASONE PROPIONATE 50 MCG
1 SPRAY, SUSPENSION (ML) NASAL DAILY
Qty: 16 G | Refills: 2 | Status: SHIPPED | OUTPATIENT
Start: 2023-05-01 | End: 2023-08-03 | Stop reason: SDUPTHER

## 2023-05-01 RX ORDER — ZALEPLON 10 MG/1
10 CAPSULE ORAL NIGHTLY PRN
COMMUNITY
Start: 2023-02-03

## 2023-05-01 RX ORDER — PREGABALIN 100 MG/1
100 CAPSULE ORAL 2 TIMES DAILY
Qty: 60 CAPSULE | Refills: 1 | Status: SHIPPED | OUTPATIENT
Start: 2023-05-01 | End: 2023-08-01

## 2023-05-01 RX ORDER — ROSUVASTATIN CALCIUM 40 MG/1
40 TABLET, COATED ORAL NIGHTLY
Qty: 90 TABLET | Refills: 3 | Status: SHIPPED | OUTPATIENT
Start: 2023-05-01 | End: 2023-08-01 | Stop reason: SDUPTHER

## 2023-05-01 RX ORDER — TAMSULOSIN HYDROCHLORIDE 0.4 MG/1
0.8 CAPSULE ORAL DAILY
Qty: 90 CAPSULE | Refills: 1 | Status: SHIPPED | OUTPATIENT
Start: 2023-05-01 | End: 2023-08-01 | Stop reason: SDUPTHER

## 2023-05-05 PROBLEM — M54.50 LOW BACK PAIN: Status: ACTIVE | Noted: 2023-05-05

## 2023-05-05 NOTE — PROGRESS NOTES
Subjective:       Patient ID: Hola Burrell is a 58 y.o. male.    Chief Complaint: Medication Refill and Back Pain    Medication Refill  Pertinent negatives include no abdominal pain, arthralgias, chest pain, congestion, coughing, fatigue, myalgias or rash.   Back Pain  Pertinent negatives include no abdominal pain, chest pain or dysuria.   .  Patient complains of moderate low back pain patient rates the pain a 10/10 caused it severe.  Going to refer the patient to total pain management per his request patient also has a history of allergic rhinitis and dyslipidemia and BPH.  Will refill his Flomax refill Flonase and refill Crestor.  Also due to his neuropathy and chronic pain I am going to refill the patient's Lyrica.    Current Medications:    Current Outpatient Medications:     cariprazine (VRAYLAR) 3 mg Cap, Take 3 mg by mouth once daily., Disp: , Rfl:     carvediloL (COREG) 6.25 MG tablet, Take 1 tablet (6.25 mg total) by mouth 2 (two) times daily with meals., Disp: 60 tablet, Rfl: 11    naproxen (NAPROSYN) 500 MG tablet, Take 1 tablet (500 mg total) by mouth 2 (two) times daily as needed (pain)., Disp: 20 tablet, Rfl: 0    sildenafiL (VIAGRA) 100 MG tablet, Take 1 tablet (100 mg total) by mouth daily as needed for Erectile Dysfunction., Disp: 10 tablet, Rfl: 3    zaleplon (SONATA) 10 MG capsule, Take 10 mg by mouth nightly as needed., Disp: , Rfl:     fluticasone propionate (FLONASE) 50 mcg/actuation nasal spray, 1 spray (50 mcg total) by Each Nostril route once daily., Disp: 16 g, Rfl: 2    pregabalin (LYRICA) 100 MG capsule, Take 1 capsule (100 mg total) by mouth 2 (two) times daily., Disp: 60 capsule, Rfl: 1    rosuvastatin (CRESTOR) 40 MG Tab, Take 1 tablet (40 mg total) by mouth every evening., Disp: 90 tablet, Rfl: 3    tamsulosin (FLOMAX) 0.4 mg Cap, Take 2 capsules (0.8 mg total) by mouth once daily., Disp: 90 capsule, Rfl: 1           Review of Systems   Constitutional:  Negative for appetite  "change and fatigue.   HENT:  Negative for nasal congestion and rhinorrhea.    Eyes:  Negative for redness and visual disturbance.   Respiratory:  Negative for cough and shortness of breath.    Cardiovascular:  Negative for chest pain and leg swelling.   Gastrointestinal:  Negative for abdominal pain, constipation and diarrhea.   Endocrine: Negative for polyuria.   Genitourinary:  Negative for difficulty urinating, dysuria and erectile dysfunction.   Musculoskeletal:  Positive for back pain. Negative for arthralgias and myalgias.   Integumentary:  Negative for rash and mole/lesion.   All other systems reviewed and are negative.             Vitals:    05/01/23 1025   BP: 132/86   BP Location: Right arm   Patient Position: Sitting   BP Method: Large (Automatic)   Pulse: 72   Resp: 18   Temp: 97.8 °F (36.6 °C)   TempSrc: Temporal   SpO2: 99%   Weight: 115.3 kg (254 lb 3.2 oz)   Height: 6' 4" (1.93 m)        Physical Exam  Vitals and nursing note reviewed.   Constitutional:       Appearance: Normal appearance.   Cardiovascular:      Rate and Rhythm: Normal rate and regular rhythm.      Pulses: Normal pulses.      Heart sounds: Normal heart sounds.   Pulmonary:      Effort: Pulmonary effort is normal.      Breath sounds: Normal breath sounds.   Abdominal:      General: Abdomen is flat. Bowel sounds are normal.      Palpations: Abdomen is soft.   Musculoskeletal:         General: Normal range of motion.   Skin:     General: Skin is warm and dry.   Neurological:      General: No focal deficit present.      Mental Status: He is alert and oriented to person, place, and time. Mental status is at baseline.         Last Labs:     No visits with results within 1 Month(s) from this visit.   Latest known visit with results is:   Procedure visit on 10/13/2022   Component Date Value    Final Diagnosis 10/13/2022                      Value:This result contains rich text formatting which cannot be displayed here.    Specimen A " Procedure 10/13/2022 Excision     Specimen A Morphology 10/13/2022 Subcutaneous nodule     Specimen A DDx 10/13/2022 Pilar cyst vs lipoma     Microscopic Description 10/13/2022                      Value:This result contains rich text formatting which cannot be displayed here.    Gross Description 10/13/2022                      Value:This result contains rich text formatting which cannot be displayed here.    Laboratory Notes 10/13/2022                      Value:This result contains rich text formatting which cannot be displayed here.    Case Report 10/13/2022                      Value:Surgical Pathology                                Case: [id number]                                   Authorizing Provider:  Palmira Cochran MD        Collected:           10/13/2022 10:06 AM          Ordering Location:     [facility] Received:            10/14/2022 08:33 AM          Pathologist:           Alberto Morris III, MD                                                                           Specimen:    Scalp, vertex                                                                                 Last Imaging:  MRI Lumbar Spine Without Contrast  Narrative: EXAMINATION:  MRI LUMBAR SPINE WITHOUT CONTRAST    CLINICAL HISTORY:  Lumbar radiculopathy, symptoms persist with conservative treatment; Radiculopathy, lumbar region    TECHNIQUE:  Multiplanar, multisequence MRI of the lumbar spine performed without the administration of contrast.    COMPARISON:  Radiograph 04/04/2022    FINDINGS:  The leftward curvature of the lumbar spine is again seen but better depicted on the recent radiograph.  The sagittal alignment appears similar to the radiograph with a minimal degree of retrolisthesis of L4 on L5.  Degenerative endplate changes are seen throughout.  Conus terminates at L1.    L1-L2: Disc bulge and facet degeneration.  No spinal  canal stenosis.  Mild bilateral foraminal stenosis.    L2-3: Disc bulge.  Facet degeneration.  Mild spinal canal and mild bilateral foraminal stenosis.    L3-4: Disc bulge and facet degeneration.  Mild spinal canal stenosis.  Moderate right and mild left foraminal stenosis.    L4-5: Disc bulge and facet degeneration.  Mild spinal canal stenosis.  Moderate bilateral foraminal stenosis.    L5-S1: Disc bulge and facet degeneration.  Mild spinal canal stenosis.  Moderate left and mild right foraminal stenosis.  Impression: Multilevel degenerative changes throughout the lumbar spine.    Electronically signed by: Alli Candelaria  Date:    08/03/2022  Time:    12:38         **Labs and x-rays personally reviewed by me    ** reviewed      Objective:        Assessment:       1. Low back pain, unspecified back pain laterality, unspecified chronicity, unspecified whether sciatica present  Ambulatory referral/consult to Pain Clinic      2. Dyslipidemia  Refill Crestor           Plan:         [unfilled]

## 2023-05-18 ENCOUNTER — OFFICE VISIT (OUTPATIENT)
Dept: FAMILY MEDICINE | Facility: CLINIC | Age: 58
End: 2023-05-18
Payer: MEDICARE

## 2023-05-18 VITALS
RESPIRATION RATE: 18 BRPM | WEIGHT: 254 LBS | SYSTOLIC BLOOD PRESSURE: 135 MMHG | BODY MASS INDEX: 30.93 KG/M2 | HEART RATE: 82 BPM | HEIGHT: 76 IN | OXYGEN SATURATION: 97 % | DIASTOLIC BLOOD PRESSURE: 89 MMHG | TEMPERATURE: 97 F

## 2023-05-18 DIAGNOSIS — E66.9 CLASS 1 OBESITY WITH BODY MASS INDEX (BMI) OF 30.0 TO 30.9 IN ADULT, UNSPECIFIED OBESITY TYPE, UNSPECIFIED WHETHER SERIOUS COMORBIDITY PRESENT: ICD-10-CM

## 2023-05-18 DIAGNOSIS — I73.9 PAD (PERIPHERAL ARTERY DISEASE): Primary | ICD-10-CM

## 2023-05-18 PROCEDURE — 3079F DIAST BP 80-89 MM HG: CPT | Mod: ,,, | Performed by: INTERNAL MEDICINE

## 2023-05-18 PROCEDURE — 3075F PR MOST RECENT SYSTOLIC BLOOD PRESS GE 130-139MM HG: ICD-10-PCS | Mod: ,,, | Performed by: INTERNAL MEDICINE

## 2023-05-18 PROCEDURE — 3079F PR MOST RECENT DIASTOLIC BLOOD PRESSURE 80-89 MM HG: ICD-10-PCS | Mod: ,,, | Performed by: INTERNAL MEDICINE

## 2023-05-18 PROCEDURE — 1160F PR REVIEW ALL MEDS BY PRESCRIBER/CLIN PHARMACIST DOCUMENTED: ICD-10-PCS | Mod: ,,, | Performed by: INTERNAL MEDICINE

## 2023-05-18 PROCEDURE — 3008F BODY MASS INDEX DOCD: CPT | Mod: ,,, | Performed by: INTERNAL MEDICINE

## 2023-05-18 PROCEDURE — 1159F PR MEDICATION LIST DOCUMENTED IN MEDICAL RECORD: ICD-10-PCS | Mod: ,,, | Performed by: INTERNAL MEDICINE

## 2023-05-18 PROCEDURE — 3008F PR BODY MASS INDEX (BMI) DOCUMENTED: ICD-10-PCS | Mod: ,,, | Performed by: INTERNAL MEDICINE

## 2023-05-18 PROCEDURE — 1160F RVW MEDS BY RX/DR IN RCRD: CPT | Mod: ,,, | Performed by: INTERNAL MEDICINE

## 2023-05-18 PROCEDURE — 99213 PR OFFICE/OUTPT VISIT, EST, LEVL III, 20-29 MIN: ICD-10-PCS | Mod: ,,, | Performed by: INTERNAL MEDICINE

## 2023-05-18 PROCEDURE — 99213 OFFICE O/P EST LOW 20 MIN: CPT | Mod: ,,, | Performed by: INTERNAL MEDICINE

## 2023-05-18 PROCEDURE — 3075F SYST BP GE 130 - 139MM HG: CPT | Mod: ,,, | Performed by: INTERNAL MEDICINE

## 2023-05-18 PROCEDURE — 1159F MED LIST DOCD IN RCRD: CPT | Mod: ,,, | Performed by: INTERNAL MEDICINE

## 2023-05-19 PROBLEM — I73.9 PAD (PERIPHERAL ARTERY DISEASE): Status: ACTIVE | Noted: 2023-05-19

## 2023-05-19 PROBLEM — R60.9 EDEMA: Status: ACTIVE | Noted: 2023-05-19

## 2023-05-19 PROBLEM — E66.9 CLASS 1 OBESITY WITH BODY MASS INDEX (BMI) OF 30.0 TO 30.9 IN ADULT: Status: ACTIVE | Noted: 2023-05-19

## 2023-05-19 PROBLEM — E66.811 CLASS 1 OBESITY WITH BODY MASS INDEX (BMI) OF 30.0 TO 30.9 IN ADULT: Status: ACTIVE | Noted: 2023-05-19

## 2023-05-19 NOTE — PROGRESS NOTES
Subjective:       Patient ID: Hola Burrell is a 58 y.o. male.    Chief Complaint: periphearl artery disease (Follow up )    HPI  .  58-year-old gentleman presents to the clinic today stating that he was told by a nurse who works for his insurance company that he has peripheral vascular disease.  He was axis see me for peripheral vascular disease workup.  I was able to palpate a dorsalis and posterior tibialis pulse 2/10.  There is no evidence of rubor are Um warmth are tenderness to his feet.  However, will proceed with an arterial Doppler studies bilaterally, and referred patient to Dr. Matthews the vascular surgeon specialist for an evaluation.  Patient is obese based on his BMI  I recommend low carb diet, exercise and lifestyle modification.    Current Medications:    Current Outpatient Medications:     cariprazine (VRAYLAR) 3 mg Cap, Take 3 mg by mouth once daily., Disp: , Rfl:     carvediloL (COREG) 6.25 MG tablet, Take 1 tablet (6.25 mg total) by mouth 2 (two) times daily with meals., Disp: 60 tablet, Rfl: 11    fluticasone propionate (FLONASE) 50 mcg/actuation nasal spray, 1 spray (50 mcg total) by Each Nostril route once daily., Disp: 16 g, Rfl: 2    naproxen (NAPROSYN) 500 MG tablet, Take 1 tablet (500 mg total) by mouth 2 (two) times daily as needed (pain)., Disp: 20 tablet, Rfl: 0    pregabalin (LYRICA) 100 MG capsule, Take 1 capsule (100 mg total) by mouth 2 (two) times daily., Disp: 60 capsule, Rfl: 1    rosuvastatin (CRESTOR) 40 MG Tab, Take 1 tablet (40 mg total) by mouth every evening., Disp: 90 tablet, Rfl: 3    sildenafiL (VIAGRA) 100 MG tablet, Take 1 tablet (100 mg total) by mouth daily as needed for Erectile Dysfunction., Disp: 10 tablet, Rfl: 3    tamsulosin (FLOMAX) 0.4 mg Cap, Take 2 capsules (0.8 mg total) by mouth once daily., Disp: 90 capsule, Rfl: 1    zaleplon (SONATA) 10 MG capsule, Take 10 mg by mouth nightly as needed., Disp: , Rfl:            Review of Systems   Constitutional:   "Negative for appetite change and fatigue.   HENT:  Negative for nasal congestion and rhinorrhea.    Eyes:  Negative for redness and visual disturbance.   Respiratory:  Negative for cough and shortness of breath.    Cardiovascular:  Negative for chest pain and leg swelling.   Gastrointestinal:  Negative for abdominal pain, constipation and diarrhea.   Endocrine: Negative for polyuria.   Genitourinary:  Negative for difficulty urinating, dysuria and erectile dysfunction.   Musculoskeletal:  Negative for arthralgias and myalgias.   Integumentary:  Negative for rash and mole/lesion.   All other systems reviewed and are negative.             Vitals:    05/18/23 1458   BP: 135/89   BP Location: Right arm   Patient Position: Sitting   BP Method: Large (Automatic)   Pulse: 82   Resp: 18   Temp: 97.3 °F (36.3 °C)   TempSrc: Temporal   SpO2: 97%   Weight: 115.2 kg (254 lb)   Height: 6' 4" (1.93 m)        Physical Exam  Vitals and nursing note reviewed.   Constitutional:       Appearance: Normal appearance. He is obese.   HENT:      Head: Normocephalic and atraumatic.      Nose: Nose normal.      Mouth/Throat:      Mouth: Mucous membranes are moist.      Pharynx: Oropharynx is clear.   Eyes:      Extraocular Movements: Extraocular movements intact.      Pupils: Pupils are equal, round, and reactive to light.   Cardiovascular:      Rate and Rhythm: Normal rate and regular rhythm.      Pulses: Normal pulses.      Heart sounds: Normal heart sounds.   Pulmonary:      Effort: Pulmonary effort is normal.      Breath sounds: Normal breath sounds.   Abdominal:      General: Abdomen is flat. Bowel sounds are normal.      Palpations: Abdomen is soft.   Musculoskeletal:         General: Normal range of motion.      Cervical back: Normal range of motion and neck supple.      Right lower leg: No edema.      Left lower leg: No edema.   Skin:     General: Skin is warm and dry.      Coloration: Skin is not jaundiced or pale.      Findings: No " rash.   Neurological:      General: No focal deficit present.      Mental Status: He is alert and oriented to person, place, and time. Mental status is at baseline.   Psychiatric:         Mood and Affect: Mood normal.         Thought Content: Thought content normal.         Last Labs:     No visits with results within 1 Month(s) from this visit.   Latest known visit with results is:   Procedure visit on 10/13/2022   Component Date Value    Final Diagnosis 10/13/2022                      Value:This result contains rich text formatting which cannot be displayed here.    Specimen A Procedure 10/13/2022 Excision     Specimen A Morphology 10/13/2022 Subcutaneous nodule     Specimen A DDx 10/13/2022 Pilar cyst vs lipoma     Microscopic Description 10/13/2022                      Value:This result contains rich text formatting which cannot be displayed here.    Gross Description 10/13/2022                      Value:This result contains rich text formatting which cannot be displayed here.    Laboratory Notes 10/13/2022                      Value:This result contains rich text formatting which cannot be displayed here.    Case Report 10/13/2022                      Value:Surgical Pathology                                Case: [id number]                                   Authorizing Provider:  Palmira Cochran MD        Collected:           10/13/2022 10:06 AM          Ordering Location:     [facility] Received:            10/14/2022 08:33 AM          Pathologist:           Alberto Morris III, MD                                                                           Specimen:    Scalp, vertex                                                                                 Last Imaging:  MRI Lumbar Spine Without Contrast  Narrative: EXAMINATION:  MRI LUMBAR SPINE WITHOUT CONTRAST    CLINICAL HISTORY:  Lumbar radiculopathy,  symptoms persist with conservative treatment; Radiculopathy, lumbar region    TECHNIQUE:  Multiplanar, multisequence MRI of the lumbar spine performed without the administration of contrast.    COMPARISON:  Radiograph 04/04/2022    FINDINGS:  The leftward curvature of the lumbar spine is again seen but better depicted on the recent radiograph.  The sagittal alignment appears similar to the radiograph with a minimal degree of retrolisthesis of L4 on L5.  Degenerative endplate changes are seen throughout.  Conus terminates at L1.    L1-L2: Disc bulge and facet degeneration.  No spinal canal stenosis.  Mild bilateral foraminal stenosis.    L2-3: Disc bulge.  Facet degeneration.  Mild spinal canal and mild bilateral foraminal stenosis.    L3-4: Disc bulge and facet degeneration.  Mild spinal canal stenosis.  Moderate right and mild left foraminal stenosis.    L4-5: Disc bulge and facet degeneration.  Mild spinal canal stenosis.  Moderate bilateral foraminal stenosis.    L5-S1: Disc bulge and facet degeneration.  Mild spinal canal stenosis.  Moderate left and mild right foraminal stenosis.  Impression: Multilevel degenerative changes throughout the lumbar spine.    Electronically signed by: Alli Candelaria  Date:    08/03/2022  Time:    12:38         **Labs and x-rays personally reviewed by me    ** reviewed      Objective:        Assessment:       1. PAD (peripheral artery disease)  Ambulatory referral/consult to [facility]    US Lower Extremity Veins Bilateral      2. Class 1 obesity with body mass index (BMI) of 30.0 to 30.9 in adult, unspecified obesity type, unspecified whether serious comorbidity present             Plan:         [unfilled]

## 2023-05-31 DIAGNOSIS — I73.9 PAD (PERIPHERAL ARTERY DISEASE): Primary | ICD-10-CM

## 2023-06-09 DIAGNOSIS — Z71.89 COMPLEX CARE COORDINATION: ICD-10-CM

## 2023-06-12 ENCOUNTER — OFFICE VISIT (OUTPATIENT)
Dept: SURGERY | Facility: CLINIC | Age: 58
End: 2023-06-12
Payer: MEDICARE

## 2023-06-12 DIAGNOSIS — R09.89 BRUIT: ICD-10-CM

## 2023-06-12 DIAGNOSIS — I73.9 CLAUDICATION: ICD-10-CM

## 2023-06-12 DIAGNOSIS — R42 GIDDINESS: Primary | ICD-10-CM

## 2023-06-12 PROCEDURE — 99203 OFFICE O/P NEW LOW 30 MIN: CPT | Mod: S$PBB,,, | Performed by: NURSE PRACTITIONER

## 2023-06-12 PROCEDURE — 99203 PR OFFICE/OUTPT VISIT, NEW, LEVL III, 30-44 MIN: ICD-10-PCS | Mod: S$PBB,,, | Performed by: NURSE PRACTITIONER

## 2023-06-12 PROCEDURE — 99213 OFFICE O/P EST LOW 20 MIN: CPT | Mod: PBBFAC | Performed by: NURSE PRACTITIONER

## 2023-06-12 PROCEDURE — 1159F MED LIST DOCD IN RCRD: CPT | Mod: CPTII,,, | Performed by: NURSE PRACTITIONER

## 2023-06-12 PROCEDURE — 1160F PR REVIEW ALL MEDS BY PRESCRIBER/CLIN PHARMACIST DOCUMENTED: ICD-10-PCS | Mod: CPTII,,, | Performed by: NURSE PRACTITIONER

## 2023-06-12 PROCEDURE — 1160F RVW MEDS BY RX/DR IN RCRD: CPT | Mod: CPTII,,, | Performed by: NURSE PRACTITIONER

## 2023-06-12 PROCEDURE — 1159F PR MEDICATION LIST DOCUMENTED IN MEDICAL RECORD: ICD-10-PCS | Mod: CPTII,,, | Performed by: NURSE PRACTITIONER

## 2023-06-12 NOTE — PROGRESS NOTES
Subjective:       Patient ID: Hola Burrell is a 58 y.o. male.    Chief Complaint: Referral (RF Dr. Yañez for PAD)    Referred from Dr. Yañez today to evaluate for atherosclerosis, patient reports had mobile screening for PVD that was abnormal, smoker lower back pain lightheadedness, denies CVA or TIA no focal weakness PMH hypertension    family history includes Cancer in his brother, father, mother, and sister; Diabetes in his maternal aunt and maternal uncle; Hypertension in his maternal grandfather; Mental illness in his sister.  Past Medical History:   Diagnosis Date    Alcohol dependence with uncomplicated intoxication     Asthma     Bilateral primary osteoarthritis of hip     Bipolar disorder     Drug use     Herpes simplex     hyperlipidemia     Hypertension       History reviewed. No pertinent surgical history.    reports that he has been smoking cigarettes. He has a 40.00 pack-year smoking history. He has never used smokeless tobacco. He reports that he does not currently use alcohol. He reports that he does not currently use drugs after having used the following drugs: Marijuana.   HPI  Review of Systems   Musculoskeletal:  Positive for back pain.   Neurological:  Positive for light-headedness.       Objective:      There were no vitals taken for this visit.   Physical Exam  Vitals and nursing note reviewed.   Constitutional:       Appearance: Normal appearance.   HENT:      Head: Normocephalic.      Mouth/Throat:      Mouth: Mucous membranes are moist.   Eyes:      Conjunctiva/sclera: Conjunctivae normal.   Cardiovascular:      Rate and Rhythm: Normal rate and regular rhythm.      Comments: Palpable bilateral DP pulses no wounds no source  Pulmonary:      Effort: Pulmonary effort is normal.   Abdominal:      Palpations: Abdomen is soft.   Skin:     General: Skin is warm and dry.   Neurological:      Mental Status: He is alert and oriented to person, place, and time.   Psychiatric:         Mood and  Affect: Mood normal.         Assessment:       1. Giddiness    2. Claudication    3. Bruit        Plan:       Low suspicion for PVD will order JORGE with exercise  He is never had carotid duplex he has risk factors of smoking, HTN  with complaints of lightheadedness

## 2023-06-20 ENCOUNTER — HOSPITAL ENCOUNTER (OUTPATIENT)
Dept: RADIOLOGY | Facility: HOSPITAL | Age: 58
Discharge: HOME | End: 2023-06-20
Attending: NURSE PRACTITIONER
Payer: MEDICARE

## 2023-06-20 ENCOUNTER — OFFICE VISIT (OUTPATIENT)
Dept: SURGERY | Facility: CLINIC | Age: 58
End: 2023-06-20
Payer: MEDICARE

## 2023-06-20 DIAGNOSIS — R09.89 BRUIT: ICD-10-CM

## 2023-06-20 DIAGNOSIS — I65.22 CAROTID STENOSIS, LEFT: ICD-10-CM

## 2023-06-20 DIAGNOSIS — R42 GIDDINESS: ICD-10-CM

## 2023-06-20 DIAGNOSIS — I65.21 CAROTID STENOSIS, RIGHT: Primary | ICD-10-CM

## 2023-06-20 PROCEDURE — 93880 US CAROTID BILATERAL: ICD-10-PCS | Mod: 26,,, | Performed by: SURGERY

## 2023-06-20 PROCEDURE — 93880 EXTRACRANIAL BILAT STUDY: CPT | Mod: TC

## 2023-06-20 PROCEDURE — 1159F MED LIST DOCD IN RCRD: CPT | Mod: CPTII,,, | Performed by: NURSE PRACTITIONER

## 2023-06-20 PROCEDURE — 93924 US ARTERIAL DOPPLER W/ EXERCISE: ICD-10-PCS | Mod: 26,,, | Performed by: SURGERY

## 2023-06-20 PROCEDURE — 99213 OFFICE O/P EST LOW 20 MIN: CPT | Mod: PBBFAC,25 | Performed by: NURSE PRACTITIONER

## 2023-06-20 PROCEDURE — 1160F RVW MEDS BY RX/DR IN RCRD: CPT | Mod: CPTII,,, | Performed by: NURSE PRACTITIONER

## 2023-06-20 PROCEDURE — 93924 LWR XTR VASC STDY BILAT: CPT | Mod: TC

## 2023-06-20 PROCEDURE — 1159F PR MEDICATION LIST DOCUMENTED IN MEDICAL RECORD: ICD-10-PCS | Mod: CPTII,,, | Performed by: NURSE PRACTITIONER

## 2023-06-20 PROCEDURE — 93880 EXTRACRANIAL BILAT STUDY: CPT | Mod: 26,,, | Performed by: SURGERY

## 2023-06-20 PROCEDURE — 93924 LWR XTR VASC STDY BILAT: CPT | Mod: 26,,, | Performed by: SURGERY

## 2023-06-20 PROCEDURE — 99213 PR OFFICE/OUTPT VISIT, EST, LEVL III, 20-29 MIN: ICD-10-PCS | Mod: S$PBB,,, | Performed by: NURSE PRACTITIONER

## 2023-06-20 PROCEDURE — 1160F PR REVIEW ALL MEDS BY PRESCRIBER/CLIN PHARMACIST DOCUMENTED: ICD-10-PCS | Mod: CPTII,,, | Performed by: NURSE PRACTITIONER

## 2023-06-20 PROCEDURE — 99213 OFFICE O/P EST LOW 20 MIN: CPT | Mod: S$PBB,,, | Performed by: NURSE PRACTITIONER

## 2023-06-20 NOTE — PROGRESS NOTES
Subjective:       Patient ID: Hola Burrell is a 58 y.o. male.    Chief Complaint: Follow-up and Post-op Evaluation    Referred from Dr. Yañez today to evaluate for atherosclerosis, patient reports had mobile screening for PVD that was abnormal, smoker lower back pain lightheadedness, denies CVA or TIA no focal weakness [facility] hypertension    06/20/2023  follow-up carotid ultrasound results mild stenosis, JORGE with exercise normal right JORGE rest 1.16 post exercise 0.96 left JORGE rest 1.25 post 0.97    family history includes Cancer in his brother, father, mother, and sister; Diabetes in his maternal aunt and maternal uncle; Hypertension in his maternal grandfather; Mental illness in his sister.  Past Medical History:   Diagnosis Date    Alcohol dependence with uncomplicated intoxication     Asthma     Bilateral primary osteoarthritis of hip     Bipolar disorder     Drug use     Herpes simplex     hyperlipidemia     Hypertension       History reviewed. No pertinent surgical history.    reports that he has been smoking cigarettes. He has a 40.00 pack-year smoking history. He has never used smokeless tobacco. He reports that he does not currently use alcohol. He reports that he does not currently use drugs after having used the following drugs: Marijuana.   HPI  Review of Systems   Musculoskeletal:  Positive for back pain.   Neurological:  Positive for light-headedness.       Objective:      There were no vitals taken for this visit.   Physical Exam  Vitals and nursing note reviewed.   Constitutional:       Appearance: Normal appearance.   HENT:      Head: Normocephalic.      Mouth/Throat:      Mouth: Mucous membranes are moist.   Eyes:      Conjunctiva/sclera: Conjunctivae normal.   Cardiovascular:      Rate and Rhythm: Normal rate and regular rhythm.      Comments: Palpable bilateral DP pulses no wounds no source  Pulmonary:      Effort: Pulmonary effort is normal.   Abdominal:      Palpations: Abdomen is soft.    Skin:     General: Skin is warm and dry.   Neurological:      Mental Status: He is alert and oriented to person, place, and time.   Psychiatric:         Mood and Affect: Mood normal.         Assessment:       1. Carotid stenosis, right    2. Carotid stenosis, left        Normal JORGE with exercise minimum carotid stenosis  Plan:       Smoking cessation heart healthy diet exercise  Call or return p.r.n. problems

## 2023-06-26 DIAGNOSIS — G89.29 OTHER CHRONIC PAIN: Primary | ICD-10-CM

## 2023-08-01 ENCOUNTER — OFFICE VISIT (OUTPATIENT)
Dept: FAMILY MEDICINE | Facility: CLINIC | Age: 58
End: 2023-08-01
Payer: MEDICARE

## 2023-08-01 VITALS
HEIGHT: 76 IN | DIASTOLIC BLOOD PRESSURE: 96 MMHG | TEMPERATURE: 97 F | BODY MASS INDEX: 30.83 KG/M2 | WEIGHT: 253.19 LBS | RESPIRATION RATE: 18 BRPM | HEART RATE: 73 BPM | OXYGEN SATURATION: 97 % | SYSTOLIC BLOOD PRESSURE: 142 MMHG

## 2023-08-01 DIAGNOSIS — R06.02 SHORTNESS OF BREATH: ICD-10-CM

## 2023-08-01 DIAGNOSIS — R06.00 DYSPNEA, UNSPECIFIED TYPE: Primary | ICD-10-CM

## 2023-08-01 DIAGNOSIS — E78.5 DYSLIPIDEMIA: ICD-10-CM

## 2023-08-01 PROCEDURE — 3080F DIAST BP >= 90 MM HG: CPT | Mod: ,,, | Performed by: INTERNAL MEDICINE

## 2023-08-01 PROCEDURE — 99214 PR OFFICE/OUTPT VISIT, EST, LEVL IV, 30-39 MIN: ICD-10-PCS | Mod: ,,, | Performed by: INTERNAL MEDICINE

## 2023-08-01 PROCEDURE — 3077F SYST BP >= 140 MM HG: CPT | Mod: ,,, | Performed by: INTERNAL MEDICINE

## 2023-08-01 PROCEDURE — 99214 OFFICE O/P EST MOD 30 MIN: CPT | Mod: ,,, | Performed by: INTERNAL MEDICINE

## 2023-08-01 PROCEDURE — 3008F PR BODY MASS INDEX (BMI) DOCUMENTED: ICD-10-PCS | Mod: ,,, | Performed by: INTERNAL MEDICINE

## 2023-08-01 PROCEDURE — 1159F MED LIST DOCD IN RCRD: CPT | Mod: ,,, | Performed by: INTERNAL MEDICINE

## 2023-08-01 PROCEDURE — 1159F PR MEDICATION LIST DOCUMENTED IN MEDICAL RECORD: ICD-10-PCS | Mod: ,,, | Performed by: INTERNAL MEDICINE

## 2023-08-01 PROCEDURE — 1160F RVW MEDS BY RX/DR IN RCRD: CPT | Mod: ,,, | Performed by: INTERNAL MEDICINE

## 2023-08-01 PROCEDURE — 3080F PR MOST RECENT DIASTOLIC BLOOD PRESSURE >= 90 MM HG: ICD-10-PCS | Mod: ,,, | Performed by: INTERNAL MEDICINE

## 2023-08-01 PROCEDURE — 3008F BODY MASS INDEX DOCD: CPT | Mod: ,,, | Performed by: INTERNAL MEDICINE

## 2023-08-01 PROCEDURE — 3077F PR MOST RECENT SYSTOLIC BLOOD PRESSURE >= 140 MM HG: ICD-10-PCS | Mod: ,,, | Performed by: INTERNAL MEDICINE

## 2023-08-01 PROCEDURE — 1160F PR REVIEW ALL MEDS BY PRESCRIBER/CLIN PHARMACIST DOCUMENTED: ICD-10-PCS | Mod: ,,, | Performed by: INTERNAL MEDICINE

## 2023-08-01 RX ORDER — TAMSULOSIN HYDROCHLORIDE 0.4 MG/1
0.8 CAPSULE ORAL DAILY
Qty: 90 CAPSULE | Refills: 1 | Status: SHIPPED | OUTPATIENT
Start: 2023-08-01 | End: 2023-10-23

## 2023-08-01 RX ORDER — PREGABALIN 100 MG/1
100 CAPSULE ORAL 2 TIMES DAILY
Qty: 60 CAPSULE | Refills: 1 | Status: CANCELLED | OUTPATIENT
Start: 2023-08-01

## 2023-08-01 RX ORDER — ROSUVASTATIN CALCIUM 40 MG/1
40 TABLET, COATED ORAL NIGHTLY
Qty: 90 TABLET | Refills: 3 | Status: SHIPPED | OUTPATIENT
Start: 2023-08-01 | End: 2023-10-26 | Stop reason: SDUPTHER

## 2023-08-01 NOTE — PROGRESS NOTES
Subjective:       Patient ID: Hola Burrell is a 58 y.o. male.    Chief Complaint: No chief complaint on file.    HPI  .  The 58 year patient presents with dyspnea on exertion dyspnea at rest but also states that the dyspnea has somewhat improved no cough lungs are completely clear patient has chronic dyslipidemia chronic BPH.  Also has chronic neuropathy and chronic pain.  The plan refilled Lyrica refilled Crestor and refill Flomax.  Current Medications:    Current Outpatient Medications:     cariprazine (VRAYLAR) 3 mg Cap, Take 3 mg by mouth once daily., Disp: , Rfl:     carvediloL (COREG) 6.25 MG tablet, Take 1 tablet (6.25 mg total) by mouth 2 (two) times daily with meals., Disp: 60 tablet, Rfl: 11    fluticasone propionate (FLONASE) 50 mcg/actuation nasal spray, 1 spray (50 mcg total) by Each Nostril route once daily., Disp: 16 g, Rfl: 2    naproxen (NAPROSYN) 500 MG tablet, Take 1 tablet (500 mg total) by mouth 2 (two) times daily as needed (pain)., Disp: 20 tablet, Rfl: 0    pregabalin (LYRICA) 100 MG capsule, Take 1 capsule (100 mg total) by mouth 2 (two) times daily., Disp: 60 capsule, Rfl: 1    rosuvastatin (CRESTOR) 40 MG Tab, Take 1 tablet (40 mg total) by mouth every evening., Disp: 90 tablet, Rfl: 3    sildenafiL (VIAGRA) 100 MG tablet, Take 1 tablet (100 mg total) by mouth daily as needed for Erectile Dysfunction., Disp: 10 tablet, Rfl: 3    tamsulosin (FLOMAX) 0.4 mg Cap, Take 2 capsules (0.8 mg total) by mouth once daily., Disp: 90 capsule, Rfl: 1    zaleplon (SONATA) 10 MG capsule, Take 10 mg by mouth nightly as needed., Disp: , Rfl:            Review of Systems   Constitutional:  Negative for appetite change and fatigue.   HENT:  Negative for nasal congestion and rhinorrhea.    Eyes:  Negative for redness and visual disturbance.   Respiratory:  Positive for shortness of breath. Negative for cough.    Cardiovascular:  Negative for chest pain and leg swelling.   Gastrointestinal:  Negative  "for abdominal pain, constipation and diarrhea.   Endocrine: Negative for polyuria.   Genitourinary:  Negative for difficulty urinating, dysuria and erectile dysfunction.   Musculoskeletal:  Negative for arthralgias and myalgias.   Integumentary:  Negative for rash and mole/lesion.   All other systems reviewed and are negative.         Due to the many adverse health risks of smoking tobacco,  Patient has been encouraged to quit smoking, and smoking sensation treatments have been   treatments have been offered and a  smoking cessation class has been recommended to the patient       Vitals:    08/01/23 0930 08/01/23 0931   BP: (!) 144/92 (!) 142/96   BP Location: Right arm Left arm   Patient Position: Sitting    BP Method: Large (Automatic)    Pulse: 73    Resp: 18    Temp: 97.3 °F (36.3 °C)    TempSrc: Temporal    SpO2: 97%    Weight: 114.9 kg (253 lb 3.2 oz)    Height: 6' 4" (1.93 m)         Physical Exam  Vitals and nursing note reviewed.   Constitutional:       Appearance: Normal appearance.   Cardiovascular:      Rate and Rhythm: Normal rate and regular rhythm.      Pulses: Normal pulses.      Heart sounds: Normal heart sounds.   Pulmonary:      Effort: Pulmonary effort is normal.      Breath sounds: Normal breath sounds.   Abdominal:      General: Abdomen is flat. Bowel sounds are normal.      Palpations: Abdomen is soft.   Musculoskeletal:         General: Normal range of motion.   Skin:     General: Skin is warm and dry.   Neurological:      General: No focal deficit present.      Mental Status: He is alert and oriented to person, place, and time. Mental status is at baseline.           Last Labs:     No visits with results within 1 Month(s) from this visit.   Latest known visit with results is:   Procedure visit on 10/13/2022   Component Date Value    Final Diagnosis 10/13/2022                      Value:This result contains rich text formatting which cannot be displayed here.    Specimen A Procedure " 10/13/2022 Excision     Specimen A Morphology 10/13/2022 Subcutaneous nodule     Specimen A DDx 10/13/2022 Pilar cyst vs lipoma     Microscopic Description 10/13/2022                      Value:This result contains rich text formatting which cannot be displayed here.    Gross Description 10/13/2022                      Value:This result contains rich text formatting which cannot be displayed here.    Laboratory Notes 10/13/2022                      Value:This result contains rich text formatting which cannot be displayed here.    Case Report 10/13/2022                      Value:Surgical Pathology                                Case: [id number]                                   Authorizing Provider:  Palmira Cochran MD        Collected:           10/13/2022 10:06 AM          Ordering Location:     [facility] Received:            10/14/2022 08:33 AM          Pathologist:           Alberto Morris III, MD                                                                           Specimen:    Scalp, vertex                                                                                 Last Imaging:  US Arterial Doppler W/Exercise  Narrative: EXAMINATION:  US ARTERIAL DOPPLER W/ EXERCISE    CLINICAL HISTORY:  Dizziness and giddiness    TECHNIQUE:  Continuous wave Doppler, incline treadmill, pneumatic cuffs    COMPARISON:  None    FINDINGS:  Right brachial pressure 140 mm Hg, JORGE 1.16    Left brachial pressure 146 mm Hg, JORGE 1.25    Left JORGE at rest 1.16, post exercise 0.96, 2 minutes 1.02, 5 minutes 1.04    Left JORGE at rest 1.25, post exercise 0.97, 2 minutes 1.02, 5 minutes 1.04  Impression: ABIs are normal at rest, there is mild decrease  with exercise, quickly returns normal    TECHNOLOGIST: Melva Tao (RT) (VS) RVT    Electronically signed by: Asmita Matthews  Date:    06/21/2023  Time:    10:09  US Carotid  Bilateral  Narrative: EXAMINATION:  US CAROTID BILATERAL    CLINICAL HISTORY:  Dizziness and giddiness    TECHNIQUE:  Grayscale and color Doppler ultrasound examination of the carotid and vertebral artery systems bilaterally.  Stenosis estimates are per the NASCET measurement criteria.    COMPARISON:  None.    FINDINGS:  Right:    Internal Carotid Artery (ICA) peak systolic velocity 57 cm/sec    ICA/CCA peak systolic velocity ratio: 0.72    External carotid 96 centimeters/second    Plaque formation: Mixed    Vertebral artery: Antegrade flow and normal waveform.    Left:    Internal Carotid Artery (ICA)  peak systolic velocity 80 cm/sec    ICA/CCA peak systolic velocity ratio: 0.99    External carotid 53 centimeters/seconds    Plaque formation: Mixed    Vertebral artery: Antegrade flow and normal waveform.  Impression: Bilateral carotids exhibit less than 50% stenosis    TECHNOLOGIST: Melva Tao (RT) (US) RVT    Electronically signed by: Asmita Matthews  Date:    06/21/2023  Time:    10:06         **Labs and x-rays personally reviewed by me    ** reviewed      Objective:        Assessment:       No diagnosis found.     Plan:         [unfilled]

## 2023-08-03 RX ORDER — FLUTICASONE PROPIONATE 50 MCG
1 SPRAY, SUSPENSION (ML) NASAL DAILY
Qty: 16 G | Refills: 2 | Status: SHIPPED | OUTPATIENT
Start: 2023-08-03

## 2023-08-03 RX ORDER — CARVEDILOL 6.25 MG/1
6.25 TABLET ORAL 2 TIMES DAILY WITH MEALS
Qty: 60 TABLET | Refills: 11 | Status: SHIPPED | OUTPATIENT
Start: 2023-08-03 | End: 2023-10-17 | Stop reason: SDUPTHER

## 2023-08-07 ENCOUNTER — CLINICAL SUPPORT (OUTPATIENT)
Dept: PULMONOLOGY | Facility: HOSPITAL | Age: 58
End: 2023-08-07
Attending: INTERNAL MEDICINE
Payer: MEDICARE

## 2023-08-07 VITALS — OXYGEN SATURATION: 98 %

## 2023-08-07 DIAGNOSIS — R06.00 DYSPNEA, UNSPECIFIED TYPE: ICD-10-CM

## 2023-08-07 PROCEDURE — 27100098 HC SPACER

## 2023-08-07 PROCEDURE — 94729 DIFFUSING CAPACITY: CPT | Mod: 26,,, | Performed by: INTERNAL MEDICINE

## 2023-08-07 PROCEDURE — 94726 PULM FUNCT TST PLETHYSMOGRAP: ICD-10-PCS | Mod: 26,,, | Performed by: INTERNAL MEDICINE

## 2023-08-07 PROCEDURE — 94060 EVALUATION OF WHEEZING: CPT

## 2023-08-07 PROCEDURE — 94729 PR C02/MEMBANE DIFFUSE CAPACITY: ICD-10-PCS | Mod: 26,,, | Performed by: INTERNAL MEDICINE

## 2023-08-07 PROCEDURE — 94726 PLETHYSMOGRAPHY LUNG VOLUMES: CPT

## 2023-08-07 PROCEDURE — 94729 DIFFUSING CAPACITY: CPT

## 2023-08-07 PROCEDURE — 94060 EVALUATION OF WHEEZING: CPT | Mod: 26,,, | Performed by: INTERNAL MEDICINE

## 2023-08-07 PROCEDURE — 94726 PLETHYSMOGRAPHY LUNG VOLUMES: CPT | Mod: 26,,, | Performed by: INTERNAL MEDICINE

## 2023-08-07 PROCEDURE — 94060 PR EVAL OF BRONCHOSPASM: ICD-10-PCS | Mod: 26,,, | Performed by: INTERNAL MEDICINE

## 2023-08-08 PROBLEM — R06.00 DYSPNEA: Status: ACTIVE | Noted: 2023-08-08

## 2023-08-08 PROBLEM — R06.02 SHORTNESS OF BREATH: Status: ACTIVE | Noted: 2023-08-08

## 2023-08-17 NOTE — PROCEDURES
Pulmonary function test  Forced vital capacity 6.07 L 121% predicted  FEV1 4.10 L 111% predicted  FEV1 ratio 70%   Normal small airways  Normal lung volumes  Isolated decreased DLCO  Normal pulmonary function test

## 2023-09-05 ENCOUNTER — OFFICE VISIT (OUTPATIENT)
Dept: FAMILY MEDICINE | Facility: CLINIC | Age: 58
End: 2023-09-05
Payer: MEDICARE

## 2023-09-05 VITALS
HEIGHT: 76 IN | BODY MASS INDEX: 29.22 KG/M2 | RESPIRATION RATE: 18 BRPM | WEIGHT: 240 LBS | DIASTOLIC BLOOD PRESSURE: 86 MMHG | HEART RATE: 102 BPM | SYSTOLIC BLOOD PRESSURE: 127 MMHG | OXYGEN SATURATION: 99 % | TEMPERATURE: 98 F

## 2023-09-05 DIAGNOSIS — M54.50 LOW BACK PAIN, UNSPECIFIED BACK PAIN LATERALITY, UNSPECIFIED CHRONICITY, UNSPECIFIED WHETHER SCIATICA PRESENT: Primary | ICD-10-CM

## 2023-09-05 DIAGNOSIS — N40.0 BENIGN PROSTATIC HYPERPLASIA, UNSPECIFIED WHETHER LOWER URINARY TRACT SYMPTOMS PRESENT: ICD-10-CM

## 2023-09-05 PROCEDURE — 1160F RVW MEDS BY RX/DR IN RCRD: CPT | Mod: ,,, | Performed by: INTERNAL MEDICINE

## 2023-09-05 PROCEDURE — 3008F PR BODY MASS INDEX (BMI) DOCUMENTED: ICD-10-PCS | Mod: ,,, | Performed by: INTERNAL MEDICINE

## 2023-09-05 PROCEDURE — 3074F PR MOST RECENT SYSTOLIC BLOOD PRESSURE < 130 MM HG: ICD-10-PCS | Mod: ,,, | Performed by: INTERNAL MEDICINE

## 2023-09-05 PROCEDURE — 3074F SYST BP LT 130 MM HG: CPT | Mod: ,,, | Performed by: INTERNAL MEDICINE

## 2023-09-05 PROCEDURE — 99214 PR OFFICE/OUTPT VISIT, EST, LEVL IV, 30-39 MIN: ICD-10-PCS | Mod: ,,, | Performed by: INTERNAL MEDICINE

## 2023-09-05 PROCEDURE — 1160F PR REVIEW ALL MEDS BY PRESCRIBER/CLIN PHARMACIST DOCUMENTED: ICD-10-PCS | Mod: ,,, | Performed by: INTERNAL MEDICINE

## 2023-09-05 PROCEDURE — 99214 OFFICE O/P EST MOD 30 MIN: CPT | Mod: ,,, | Performed by: INTERNAL MEDICINE

## 2023-09-05 PROCEDURE — 1159F MED LIST DOCD IN RCRD: CPT | Mod: ,,, | Performed by: INTERNAL MEDICINE

## 2023-09-05 PROCEDURE — 3079F PR MOST RECENT DIASTOLIC BLOOD PRESSURE 80-89 MM HG: ICD-10-PCS | Mod: ,,, | Performed by: INTERNAL MEDICINE

## 2023-09-05 PROCEDURE — 3008F BODY MASS INDEX DOCD: CPT | Mod: ,,, | Performed by: INTERNAL MEDICINE

## 2023-09-05 PROCEDURE — 3079F DIAST BP 80-89 MM HG: CPT | Mod: ,,, | Performed by: INTERNAL MEDICINE

## 2023-09-05 PROCEDURE — 1159F PR MEDICATION LIST DOCUMENTED IN MEDICAL RECORD: ICD-10-PCS | Mod: ,,, | Performed by: INTERNAL MEDICINE

## 2023-09-05 RX ORDER — NAPROXEN 500 MG/1
500 TABLET ORAL 2 TIMES DAILY PRN
Qty: 20 TABLET | Refills: 0 | Status: SHIPPED | OUTPATIENT
Start: 2023-09-05 | End: 2023-10-26 | Stop reason: SDUPTHER

## 2023-09-11 PROBLEM — N40.0 BENIGN PROSTATIC HYPERPLASIA: Status: ACTIVE | Noted: 2023-09-11

## 2023-09-11 NOTE — PROGRESS NOTES
Subjective:       Patient ID: Hola Burrell is a 58 y.o. male.    Chief Complaint: Low-back Pain and Shortness of Breath  Patient complains of moderate to severe low back pain patient rates the 8/10.  Patient also has BPH.  The plan Naprosyn 500 mg 1 p.o. b.i.d..  Low-back Pain  Pertinent negatives include no abdominal pain, arthralgias, chest pain, congestion, coughing, fatigue, myalgias or rash.   Shortness of Breath  Pertinent negatives include no abdominal pain, chest pain, leg swelling, rash or rhinorrhea.     .    Current Medications:    Current Outpatient Medications:     cariprazine (VRAYLAR) 3 mg Cap, Take 3 mg by mouth once daily., Disp: , Rfl:     carvediloL (COREG) 6.25 MG tablet, Take 1 tablet (6.25 mg total) by mouth 2 (two) times daily with meals., Disp: 60 tablet, Rfl: 11    fluticasone propionate (FLONASE) 50 mcg/actuation nasal spray, 1 spray (50 mcg total) by Each Nostril route once daily., Disp: 16 g, Rfl: 2    rosuvastatin (CRESTOR) 40 MG Tab, Take 1 tablet (40 mg total) by mouth every evening., Disp: 90 tablet, Rfl: 3    sildenafiL (VIAGRA) 100 MG tablet, Take 1 tablet (100 mg total) by mouth daily as needed for Erectile Dysfunction., Disp: 10 tablet, Rfl: 3    tamsulosin (FLOMAX) 0.4 mg Cap, Take 2 capsules (0.8 mg total) by mouth once daily., Disp: 90 capsule, Rfl: 1    zaleplon (SONATA) 10 MG capsule, Take 10 mg by mouth nightly as needed., Disp: , Rfl:     naproxen (NAPROSYN) 500 MG tablet, Take 1 tablet (500 mg total) by mouth 2 (two) times daily as needed (pain)., Disp: 20 tablet, Rfl: 0           Review of Systems   Constitutional:  Negative for appetite change and fatigue.   HENT:  Negative for nasal congestion and rhinorrhea.    Eyes:  Negative for redness and visual disturbance.   Respiratory:  Positive for shortness of breath. Negative for cough.    Cardiovascular:  Negative for chest pain and leg swelling.   Gastrointestinal:  Negative for abdominal pain, constipation and  "diarrhea.   Endocrine: Negative for polyuria.   Genitourinary:  Negative for difficulty urinating, dysuria and erectile dysfunction.   Musculoskeletal:  Negative for arthralgias and myalgias.   Integumentary:  Negative for rash and mole/lesion.   All other systems reviewed and are negative.               Vitals:    09/05/23 0903   BP: 127/86   BP Location: Right arm   Patient Position: Sitting   BP Method: Large (Automatic)   Pulse: 102   Resp: 18   Temp: 97.7 °F (36.5 °C)   TempSrc: Temporal   SpO2: 99%   Weight: 108.9 kg (240 lb)   Height: 6' 4" (1.93 m)        Physical Exam  Vitals and nursing note reviewed.   Constitutional:       Appearance: Normal appearance.   Cardiovascular:      Rate and Rhythm: Normal rate and regular rhythm.      Pulses: Normal pulses.      Heart sounds: Normal heart sounds.   Pulmonary:      Effort: Pulmonary effort is normal.      Breath sounds: Normal breath sounds.   Abdominal:      General: Abdomen is flat. Bowel sounds are normal.      Palpations: Abdomen is soft.   Musculoskeletal:         General: Normal range of motion.   Skin:     General: Skin is warm and dry.   Neurological:      General: No focal deficit present.      Mental Status: He is alert and oriented to person, place, and time. Mental status is at baseline.           Last Labs:     No visits with results within 1 Month(s) from this visit.   Latest known visit with results is:   Procedure visit on 10/13/2022   Component Date Value    Final Diagnosis 10/13/2022                      Value:This result contains rich text formatting which cannot be displayed here.    Specimen A Procedure 10/13/2022 Excision     Specimen A Morphology 10/13/2022 Subcutaneous nodule     Specimen A DDx 10/13/2022 Pilar cyst vs lipoma     Microscopic Description 10/13/2022                      Value:This result contains rich text formatting which cannot be displayed here.    Gross Description 10/13/2022                      Value:This result " contains rich text formatting which cannot be displayed here.    Laboratory Notes 10/13/2022                      Value:This result contains rich text formatting which cannot be displayed here.    Case Report 10/13/2022                      Value:Surgical Pathology                                Case: [id number]                                   Authorizing Provider:  Palmira Cochran MD        Collected:           10/13/2022 10:06 AM          Ordering Location:     [facility] Received:            10/14/2022 08:33 AM          Pathologist:           Alberto Morris III, MD                                                                           Specimen:    Scalp, vertex                                                                                 Last Imaging:  US Arterial Doppler W/Exercise  Narrative: EXAMINATION:  US ARTERIAL DOPPLER W/ EXERCISE    CLINICAL HISTORY:  Dizziness and giddiness    TECHNIQUE:  Continuous wave Doppler, incline treadmill, pneumatic cuffs    COMPARISON:  None    FINDINGS:  Right brachial pressure 140 mm Hg, JORGE 1.16    Left brachial pressure 146 mm Hg, JORGE 1.25    Left JORGE at rest 1.16, post exercise 0.96, 2 minutes 1.02, 5 minutes 1.04    Left JORGE at rest 1.25, post exercise 0.97, 2 minutes 1.02, 5 minutes 1.04  Impression: ABIs are normal at rest, there is mild decrease  with exercise, quickly returns normal    TECHNOLOGIST: Melva Tao (RT) (VS) RVT    Electronically signed by: Asmita Matthews  Date:    06/21/2023  Time:    10:09  US Carotid Bilateral  Narrative: EXAMINATION:  US CAROTID BILATERAL    CLINICAL HISTORY:  Dizziness and giddiness    TECHNIQUE:  Grayscale and color Doppler ultrasound examination of the carotid and vertebral artery systems bilaterally.  Stenosis estimates are per the NASCET measurement criteria.    COMPARISON:  None.    FINDINGS:  Right:    Internal Carotid Artery  (ICA) peak systolic velocity 57 cm/sec    ICA/CCA peak systolic velocity ratio: 0.72    External carotid 96 centimeters/second    Plaque formation: Mixed    Vertebral artery: Antegrade flow and normal waveform.    Left:    Internal Carotid Artery (ICA)  peak systolic velocity 80 cm/sec    ICA/CCA peak systolic velocity ratio: 0.99    External carotid 53 centimeters/seconds    Plaque formation: Mixed    Vertebral artery: Antegrade flow and normal waveform.  Impression: Bilateral carotids exhibit less than 50% stenosis    TECHNOLOGIST: Melva Tao (RT) () RVT    Electronically signed by: Asmita Matthews  Date:    06/21/2023  Time:    10:06         **Labs and x-rays personally reviewed by me    ** reviewed      Objective:        Assessment:       1. Low back pain, unspecified back pain laterality, unspecified chronicity, unspecified whether sciatica present        2. Benign prostatic hyperplasia, unspecified whether lower urinary tract symptoms present             Plan:         [unfilled]

## 2023-09-25 ENCOUNTER — OFFICE VISIT (OUTPATIENT)
Dept: CARDIOLOGY | Facility: CLINIC | Age: 58
End: 2023-09-25
Payer: MEDICARE

## 2023-09-25 VITALS
HEIGHT: 76 IN | BODY MASS INDEX: 30.44 KG/M2 | WEIGHT: 250 LBS | SYSTOLIC BLOOD PRESSURE: 127 MMHG | OXYGEN SATURATION: 98 % | DIASTOLIC BLOOD PRESSURE: 78 MMHG | HEART RATE: 79 BPM

## 2023-09-25 DIAGNOSIS — I10 PRIMARY HYPERTENSION: Primary | ICD-10-CM

## 2023-09-25 PROCEDURE — 3008F PR BODY MASS INDEX (BMI) DOCUMENTED: ICD-10-PCS | Mod: CPTII,,, | Performed by: STUDENT IN AN ORGANIZED HEALTH CARE EDUCATION/TRAINING PROGRAM

## 2023-09-25 PROCEDURE — 93010 ELECTROCARDIOGRAM REPORT: CPT | Mod: S$PBB,,, | Performed by: STUDENT IN AN ORGANIZED HEALTH CARE EDUCATION/TRAINING PROGRAM

## 2023-09-25 PROCEDURE — 3078F PR MOST RECENT DIASTOLIC BLOOD PRESSURE < 80 MM HG: ICD-10-PCS | Mod: CPTII,,, | Performed by: STUDENT IN AN ORGANIZED HEALTH CARE EDUCATION/TRAINING PROGRAM

## 2023-09-25 PROCEDURE — 3008F BODY MASS INDEX DOCD: CPT | Mod: CPTII,,, | Performed by: STUDENT IN AN ORGANIZED HEALTH CARE EDUCATION/TRAINING PROGRAM

## 2023-09-25 PROCEDURE — 93005 ELECTROCARDIOGRAM TRACING: CPT | Mod: PBBFAC | Performed by: STUDENT IN AN ORGANIZED HEALTH CARE EDUCATION/TRAINING PROGRAM

## 2023-09-25 PROCEDURE — 99214 PR OFFICE/OUTPT VISIT, EST, LEVL IV, 30-39 MIN: ICD-10-PCS | Mod: S$PBB,,, | Performed by: STUDENT IN AN ORGANIZED HEALTH CARE EDUCATION/TRAINING PROGRAM

## 2023-09-25 PROCEDURE — 3074F PR MOST RECENT SYSTOLIC BLOOD PRESSURE < 130 MM HG: ICD-10-PCS | Mod: CPTII,,, | Performed by: STUDENT IN AN ORGANIZED HEALTH CARE EDUCATION/TRAINING PROGRAM

## 2023-09-25 PROCEDURE — 1159F MED LIST DOCD IN RCRD: CPT | Mod: CPTII,,, | Performed by: STUDENT IN AN ORGANIZED HEALTH CARE EDUCATION/TRAINING PROGRAM

## 2023-09-25 PROCEDURE — 1159F PR MEDICATION LIST DOCUMENTED IN MEDICAL RECORD: ICD-10-PCS | Mod: CPTII,,, | Performed by: STUDENT IN AN ORGANIZED HEALTH CARE EDUCATION/TRAINING PROGRAM

## 2023-09-25 PROCEDURE — 93010 EKG 12-LEAD: ICD-10-PCS | Mod: S$PBB,,, | Performed by: STUDENT IN AN ORGANIZED HEALTH CARE EDUCATION/TRAINING PROGRAM

## 2023-09-25 PROCEDURE — 99213 OFFICE O/P EST LOW 20 MIN: CPT | Mod: PBBFAC | Performed by: STUDENT IN AN ORGANIZED HEALTH CARE EDUCATION/TRAINING PROGRAM

## 2023-09-25 PROCEDURE — 3074F SYST BP LT 130 MM HG: CPT | Mod: CPTII,,, | Performed by: STUDENT IN AN ORGANIZED HEALTH CARE EDUCATION/TRAINING PROGRAM

## 2023-09-25 PROCEDURE — 3078F DIAST BP <80 MM HG: CPT | Mod: CPTII,,, | Performed by: STUDENT IN AN ORGANIZED HEALTH CARE EDUCATION/TRAINING PROGRAM

## 2023-09-25 PROCEDURE — 99214 OFFICE O/P EST MOD 30 MIN: CPT | Mod: S$PBB,,, | Performed by: STUDENT IN AN ORGANIZED HEALTH CARE EDUCATION/TRAINING PROGRAM

## 2023-09-25 NOTE — PROGRESS NOTES
PCP: Kali Yañez MD    Referring Provider:     Subjective:   Hola Burrell is a 58 y.o. male with hx of HTN, HLD, bipolar disorder, amphetamine use who presents for follow up.    9/25/23 - Doing well. Reports occasional orthostatic symptoms; taking tamsulosin for BPH. Did not bring meds, unclear if taking coreg however his BP in clinic is well controlled. Able to keep up with his activities. Denies any cardiac symptoms.     9/22/22 - Doing well. BP is better controlled. Reports getting started on losartan but had dizzy episodes that lead him to discontinue it. Currently well controlled coreg    5/2022 -Patient had syncopal spell that he attributed to low blood sugar but does not have diabetes.  Was in a bar at the time.  He states he has had heart flutters in the past, lasting a few seconds.   He denies any chest pain.  He reports shortness of breath with exertion.  Denies lower extremity edema.  Blood pressure elevated.      Fhx: Negative for heart disease.   Shx: Smoker(Vapes), occasional etoh, states he is around drug use.     EKG 5/24/22 -  Sinus rhythm with premature atrial complexes.            4/21/22 -  sinus rhythm, QS wave in lead V1, poor r wave progression (V2), suspect anteroseptal myocardial infarction?, inverted T wave (V1,V2)  ECHO         Lab Results   Component Value Date     04/06/2022    K 3.8 04/06/2022     (H) 04/06/2022    CO2 29 04/06/2022    BUN 7 04/06/2022    CREATININE 0.99 04/06/2022    CALCIUM 8.9 04/06/2022    ANIONGAP 5 (L) 04/06/2022    ESTGFRAFRICA 94 08/17/2021    EGFRNONAA 83 04/06/2022       Lab Results   Component Value Date    CHOL 90 08/30/2022    CHOL 145 08/17/2021     Lab Results   Component Value Date    HDL 43 08/30/2022    HDL 33 (L) 08/17/2021     Lab Results   Component Value Date    LDLCALC 28 08/30/2022    LDLCALC 71 08/17/2021     Lab Results   Component Value Date    TRIG 96 08/30/2022    TRIG 206 (H) 08/17/2021     Lab Results   Component  "Value Date    CHOLHDL 2.1 08/30/2022    CHOLHDL 4.4 08/17/2021       Lab Results   Component Value Date    WBC 10.36 08/17/2021    HGB 13.8 08/17/2021    HCT 42.5 08/17/2021    MCV 93.4 08/17/2021     08/17/2021           Review of Systems   Respiratory:  Negative for cough and shortness of breath.    Cardiovascular:  Negative for chest pain, palpitations, orthopnea, claudication, leg swelling and PND.   Neurological:  Negative for loss of consciousness.         Objective:   /78 (BP Location: Left arm, Patient Position: Sitting)   Pulse 79   Ht 6' 4" (1.93 m)   Wt 113.4 kg (250 lb)   SpO2 98%   BMI 30.43 kg/m²     Physical Exam  Cardiovascular:      Rate and Rhythm: Normal rate.      Pulses: Normal pulses.      Heart sounds: Normal heart sounds. No murmur heard.  Pulmonary:      Effort: Pulmonary effort is normal.      Breath sounds: Normal breath sounds.   Musculoskeletal:         General: No swelling.      Cervical back: Neck supple.   Skin:     Findings: No rash.   Neurological:      Mental Status: He is alert and oriented to person, place, and time.           Assessment:     1. Primary hypertension                Plan:   Hypertension  Controlled  Did not bring meds  Unclear if taking coreg      Syncope and collapse  Single episode; at a bar  [place] dizzy and passed out  Improved after eating something       Mixed hyperlipidemia  On crestor 40mg qd  ASCVD risk 14.9%     Smoker  Smoking counseling performed      "

## 2023-10-10 NOTE — PROGRESS NOTES
"  Hola Burrell presented for a  Medicare AWV and comprehensive Health Risk Assessment today.     The following components were reviewed and updated:    Medical history  Family History  Social history  Allergies and Current Medications  Health Risk Assessment  Health Maintenance  Care Team         ** See Completed Assessments for Annual Wellness Visit within the encounter summary.**           The following assessments were completed:  Living Situation  CAGE  Depression Screening  Timed Get Up and Go  Whisper Test  Cognitive Function Screening  Nutrition Screening  ADL Screening  PAQ Screening        Vitals:    10/17/23 1304   BP: 110/80   BP Location: Right arm   Patient Position: Sitting   Pulse: 90   Resp: 16   Temp: 98.6 °F (37 °C)   TempSrc: Oral   SpO2: 98%   Weight: 111.1 kg (245 lb)   Height: 6' 4" (1.93 m)     Body mass index is 29.82 kg/m².  Physical Exam  Vitals reviewed.   Constitutional:       Appearance: Normal appearance. He is obese.   HENT:      Head: Normocephalic.      Mouth/Throat:      Mouth: Mucous membranes are moist.   Cardiovascular:      Rate and Rhythm: Normal rate and regular rhythm.      Pulses: Normal pulses.      Heart sounds: Normal heart sounds.   Pulmonary:      Effort: Pulmonary effort is normal.      Breath sounds: Normal breath sounds.   Abdominal:      Palpations: Abdomen is soft.      Tenderness: There is no abdominal tenderness.   Musculoskeletal:         General: Normal range of motion.      Cervical back: Neck supple.   Skin:     General: Skin is warm and dry.   Neurological:      Mental Status: He is alert and oriented to person, place, and time.   Psychiatric:         Mood and Affect: Mood normal.         Behavior: Behavior normal.           Diagnoses and health risks identified today and associated recommendations/orders:    1. Encounter for subsequent annual wellness visit (AWV) in Medicare patient  Screenings performed, as noted above. Personal preventative testing " needs reviewed.  Return for AWV in 12 months or thereafter       2. Primary hypertension  Chronic - Stable - Followed up by PCP  Continue heart healthy diet intake   Continue carvedilol 6.25 mg po BID    3. Dyslipidemia  Chronic - Stable - Followed by PCP  Continue heart healthy diet intake  Continue rosuvastatin 40 mg po every evening    4. PAD (peripheral artery disease)  6/26/23 ABIs are normal at rest, there is mild decrease  with exercise, quickly returns normal    5. DDD (degenerative disc disease), lumbar  Chronic - Stable - Followed by PCP  Continue naproxen 500 mg po BID as needed for pain    7. BMI 29.0-29.9,adult  Eat a well-balanced diet to include  vegetables, fruit, lean meats, and whole grains      Provided Hola with a 5-10 year written screening schedule and personal prevention plan. Recommendations were developed using the USPSTF age appropriate recommendations. Education, counseling, and referrals were provided as needed. After Visit Summary printed and given to patient which includes a list of additional screenings\tests needed.    Follow up in about 1 year (around 10/17/2024).    LAWANDA CASTRO RN    Lung Screen - Low dose CT Lung Screen ordered this visit - with appointment given, Shingles vaccine - order sent to pharmacy, Influenza vaccine - declined, Pneumonia vaccine - declined, Lipid panel - ordered this visit, PSA level - done this visit.    I offered to discuss advanced care planning, including how to pick a person who would make decisions for you if you were unable to make them for yourself, called a health care power of , and what kind of decisions you might make such as use of life sustaining treatments such as ventilators and tube feeding when faced with a life limiting illness recorded on a living will that they will need to know. (How you want to be cared for as you near the end of your natural life)     X Patient is interested in learning more about how to make  advanced directives.  I provided them paperwork and offered to discuss this with them.

## 2023-10-17 ENCOUNTER — OFFICE VISIT (OUTPATIENT)
Dept: FAMILY MEDICINE | Facility: CLINIC | Age: 58
End: 2023-10-17
Payer: MEDICARE

## 2023-10-17 VITALS
HEART RATE: 90 BPM | BODY MASS INDEX: 29.83 KG/M2 | RESPIRATION RATE: 16 BRPM | TEMPERATURE: 99 F | OXYGEN SATURATION: 98 % | DIASTOLIC BLOOD PRESSURE: 80 MMHG | SYSTOLIC BLOOD PRESSURE: 110 MMHG | WEIGHT: 245 LBS | HEIGHT: 76 IN

## 2023-10-17 DIAGNOSIS — R55 SYNCOPE AND COLLAPSE: ICD-10-CM

## 2023-10-17 DIAGNOSIS — I73.9 PAD (PERIPHERAL ARTERY DISEASE): ICD-10-CM

## 2023-10-17 DIAGNOSIS — Z12.2 SCREENING FOR LUNG CANCER: ICD-10-CM

## 2023-10-17 DIAGNOSIS — M51.36 DDD (DEGENERATIVE DISC DISEASE), LUMBAR: ICD-10-CM

## 2023-10-17 DIAGNOSIS — E78.5 DYSLIPIDEMIA: ICD-10-CM

## 2023-10-17 DIAGNOSIS — Z12.5 SCREENING FOR PROSTATE CANCER: ICD-10-CM

## 2023-10-17 DIAGNOSIS — Z00.00 ENCOUNTER FOR SUBSEQUENT ANNUAL WELLNESS VISIT (AWV) IN MEDICARE PATIENT: Primary | ICD-10-CM

## 2023-10-17 DIAGNOSIS — Z23 NEED FOR VACCINATION: ICD-10-CM

## 2023-10-17 DIAGNOSIS — I10 PRIMARY HYPERTENSION: ICD-10-CM

## 2023-10-17 DIAGNOSIS — F17.210 CIGARETTE NICOTINE DEPENDENCE WITHOUT COMPLICATION: ICD-10-CM

## 2023-10-17 LAB
CHOLEST SERPL-MCNC: 84 MG/DL (ref 0–200)
CHOLEST/HDLC SERPL: 1.9 {RATIO}
HDLC SERPL-MCNC: 45 MG/DL (ref 40–60)
LDLC SERPL CALC-MCNC: 21 MG/DL
NONHDLC SERPL-MCNC: 39 MG/DL
PSA SERPL-MCNC: 4.25 NG/ML
TRIGL SERPL-MCNC: 89 MG/DL (ref 35–150)
VLDLC SERPL-MCNC: 18 MG/DL

## 2023-10-17 PROCEDURE — 80061 LIPID PANEL: CPT | Mod: ,,, | Performed by: CLINICAL MEDICAL LABORATORY

## 2023-10-17 PROCEDURE — G0103 PSA SCREENING: HCPCS | Mod: ,,, | Performed by: CLINICAL MEDICAL LABORATORY

## 2023-10-17 PROCEDURE — 80061 LIPID PANEL: ICD-10-PCS | Mod: ,,, | Performed by: CLINICAL MEDICAL LABORATORY

## 2023-10-17 PROCEDURE — G0103 PSA, SCREENING: ICD-10-PCS | Mod: ,,, | Performed by: CLINICAL MEDICAL LABORATORY

## 2023-10-17 PROCEDURE — G0439 PR MEDICARE ANNUAL WELLNESS SUBSEQUENT VISIT: ICD-10-PCS | Mod: ,,, | Performed by: NURSE PRACTITIONER

## 2023-10-17 PROCEDURE — 3079F DIAST BP 80-89 MM HG: CPT | Mod: ,,, | Performed by: NURSE PRACTITIONER

## 2023-10-17 PROCEDURE — G0439 PPPS, SUBSEQ VISIT: HCPCS | Mod: ,,, | Performed by: NURSE PRACTITIONER

## 2023-10-17 PROCEDURE — 3074F PR MOST RECENT SYSTOLIC BLOOD PRESSURE < 130 MM HG: ICD-10-PCS | Mod: ,,, | Performed by: NURSE PRACTITIONER

## 2023-10-17 PROCEDURE — 1159F MED LIST DOCD IN RCRD: CPT | Mod: ,,, | Performed by: NURSE PRACTITIONER

## 2023-10-17 PROCEDURE — 3074F SYST BP LT 130 MM HG: CPT | Mod: ,,, | Performed by: NURSE PRACTITIONER

## 2023-10-17 PROCEDURE — 3079F PR MOST RECENT DIASTOLIC BLOOD PRESSURE 80-89 MM HG: ICD-10-PCS | Mod: ,,, | Performed by: NURSE PRACTITIONER

## 2023-10-17 PROCEDURE — 1159F PR MEDICATION LIST DOCUMENTED IN MEDICAL RECORD: ICD-10-PCS | Mod: ,,, | Performed by: NURSE PRACTITIONER

## 2023-10-17 RX ORDER — CARVEDILOL 6.25 MG/1
6.25 TABLET ORAL 2 TIMES DAILY WITH MEALS
Qty: 60 TABLET | Refills: 11 | Status: SHIPPED | OUTPATIENT
Start: 2023-10-17 | End: 2023-10-26 | Stop reason: SDUPTHER

## 2023-10-17 RX ORDER — ZOSTER VACCINE RECOMBINANT, ADJUVANTED 50 MCG/0.5
0.5 KIT INTRAMUSCULAR ONCE
Qty: 1 EACH | Refills: 0 | Status: SHIPPED | OUTPATIENT
Start: 2023-10-17 | End: 2023-10-17

## 2023-10-17 NOTE — PATIENT INSTRUCTIONS
Counseling and Referral of Other Preventative  (Italic type indicates deductible and co-insurance are waived)    Patient Name: Hola Burrell  Today's Date: 10/17/2023    Health Maintenance       Date Due Completion Date    Influenza Vaccine (1) Never done ---    LDCT Lung Screen 08/01/2024 (Originally 7/15/2021) 7/15/2020    Shingles Vaccine (2 of 2) 08/01/2024 (Originally 10/12/2021) 8/17/2021    Pneumococcal Vaccines (Age 0-64) (1 - PCV) 08/01/2024 (Originally 1/13/1971) ---    COVID-19 Vaccine (5 - 2023-24 season) 09/05/2024 (Originally 9/1/2023) 6/10/2022    Hemoglobin A1c (Diabetic Prevention Screening) 04/06/2025 4/6/2022    Lipid Panel 08/30/2027 8/30/2022    Colorectal Cancer Screening 08/11/2030 8/11/2020    TETANUS VACCINE 08/17/2031 8/17/2021        No orders of the defined types were placed in this encounter.      The following information is provided to all patients.  This information is to help you find resources for any of the problems found today that may be affecting your health:                Living healthy guide: www.[facility].[place].gov      Understanding Diabetes: www.diabetes.org      Eating healthy: www.cdc.gov/healthyweight      CDC home safety checklist: www.cdc.gov/steadi/patient.html      Agency on Aging: www.goea.[place].[facility]      Alcoholics anonymous (AA): www.aa.org      Physical Activity: www.ezio.nih.gov/eh3ycii      Tobacco use: www.quitwithusla.org

## 2023-10-19 ENCOUNTER — TELEPHONE (OUTPATIENT)
Dept: FAMILY MEDICINE | Facility: CLINIC | Age: 58
End: 2023-10-19
Payer: MEDICARE

## 2023-10-19 NOTE — TELEPHONE ENCOUNTER
----- Message from Kali Yañez MD sent at 10/19/2023 12:41 PM CDT -----  Need to see next Tuesday or Wednesday October 24th 25th 1638 Pt is scheduled to come in on 10/26/23

## 2023-10-23 RX ORDER — TAMSULOSIN HYDROCHLORIDE 0.4 MG/1
2 CAPSULE ORAL
Qty: 90 CAPSULE | Refills: 0 | Status: SHIPPED | OUTPATIENT
Start: 2023-10-23 | End: 2023-10-26 | Stop reason: SDUPTHER

## 2023-10-26 ENCOUNTER — OFFICE VISIT (OUTPATIENT)
Dept: FAMILY MEDICINE | Facility: CLINIC | Age: 58
End: 2023-10-26
Payer: MEDICARE

## 2023-10-26 VITALS
HEART RATE: 80 BPM | DIASTOLIC BLOOD PRESSURE: 77 MMHG | BODY MASS INDEX: 30.56 KG/M2 | OXYGEN SATURATION: 98 % | HEIGHT: 76 IN | WEIGHT: 251 LBS | RESPIRATION RATE: 18 BRPM | TEMPERATURE: 98 F | SYSTOLIC BLOOD PRESSURE: 137 MMHG

## 2023-10-26 DIAGNOSIS — N40.0 BENIGN PROSTATIC HYPERPLASIA, UNSPECIFIED WHETHER LOWER URINARY TRACT SYMPTOMS PRESENT: Primary | ICD-10-CM

## 2023-10-26 DIAGNOSIS — N52.9 ERECTILE DYSFUNCTION, UNSPECIFIED ERECTILE DYSFUNCTION TYPE: ICD-10-CM

## 2023-10-26 DIAGNOSIS — I10 PRIMARY HYPERTENSION: ICD-10-CM

## 2023-10-26 PROCEDURE — 1160F RVW MEDS BY RX/DR IN RCRD: CPT | Mod: ,,, | Performed by: INTERNAL MEDICINE

## 2023-10-26 PROCEDURE — 99214 PR OFFICE/OUTPT VISIT, EST, LEVL IV, 30-39 MIN: ICD-10-PCS | Mod: ,,, | Performed by: INTERNAL MEDICINE

## 2023-10-26 PROCEDURE — 3078F PR MOST RECENT DIASTOLIC BLOOD PRESSURE < 80 MM HG: ICD-10-PCS | Mod: ,,, | Performed by: INTERNAL MEDICINE

## 2023-10-26 PROCEDURE — 1159F PR MEDICATION LIST DOCUMENTED IN MEDICAL RECORD: ICD-10-PCS | Mod: ,,, | Performed by: INTERNAL MEDICINE

## 2023-10-26 PROCEDURE — 3008F BODY MASS INDEX DOCD: CPT | Mod: ,,, | Performed by: INTERNAL MEDICINE

## 2023-10-26 PROCEDURE — 3078F DIAST BP <80 MM HG: CPT | Mod: ,,, | Performed by: INTERNAL MEDICINE

## 2023-10-26 PROCEDURE — 3008F PR BODY MASS INDEX (BMI) DOCUMENTED: ICD-10-PCS | Mod: ,,, | Performed by: INTERNAL MEDICINE

## 2023-10-26 PROCEDURE — 3075F PR MOST RECENT SYSTOLIC BLOOD PRESS GE 130-139MM HG: ICD-10-PCS | Mod: ,,, | Performed by: INTERNAL MEDICINE

## 2023-10-26 PROCEDURE — 3075F SYST BP GE 130 - 139MM HG: CPT | Mod: ,,, | Performed by: INTERNAL MEDICINE

## 2023-10-26 PROCEDURE — 1160F PR REVIEW ALL MEDS BY PRESCRIBER/CLIN PHARMACIST DOCUMENTED: ICD-10-PCS | Mod: ,,, | Performed by: INTERNAL MEDICINE

## 2023-10-26 PROCEDURE — 1159F MED LIST DOCD IN RCRD: CPT | Mod: ,,, | Performed by: INTERNAL MEDICINE

## 2023-10-26 PROCEDURE — 99214 OFFICE O/P EST MOD 30 MIN: CPT | Mod: ,,, | Performed by: INTERNAL MEDICINE

## 2023-10-26 RX ORDER — SILDENAFIL 100 MG/1
100 TABLET, FILM COATED ORAL DAILY PRN
Qty: 10 TABLET | Refills: 3 | Status: SHIPPED | OUTPATIENT
Start: 2023-10-26

## 2023-10-26 RX ORDER — ROSUVASTATIN CALCIUM 40 MG/1
40 TABLET, COATED ORAL NIGHTLY
Qty: 90 TABLET | Refills: 3 | Status: SHIPPED | OUTPATIENT
Start: 2023-10-26 | End: 2024-10-25

## 2023-10-26 RX ORDER — NAPROXEN 500 MG/1
500 TABLET ORAL 2 TIMES DAILY PRN
Qty: 20 TABLET | Refills: 0 | Status: SHIPPED | OUTPATIENT
Start: 2023-10-26

## 2023-10-26 RX ORDER — CARVEDILOL 6.25 MG/1
6.25 TABLET ORAL 2 TIMES DAILY WITH MEALS
Qty: 60 TABLET | Refills: 11 | Status: SHIPPED | OUTPATIENT
Start: 2023-10-26 | End: 2024-10-25

## 2023-10-26 RX ORDER — CELECOXIB 200 MG/1
200 CAPSULE ORAL DAILY PRN
COMMUNITY
Start: 2023-10-23

## 2023-10-26 RX ORDER — TAMSULOSIN HYDROCHLORIDE 0.4 MG/1
0.4 CAPSULE ORAL 2 TIMES DAILY
Qty: 180 CAPSULE | Refills: 1 | Status: SHIPPED | OUTPATIENT
Start: 2023-10-26

## 2023-10-26 NOTE — PROGRESS NOTES
Subjective:       Patient ID: Hola Burrell is a 58 y.o. male.    Chief Complaint: Results (Abnl results) and Back Pain  Patient seen and evaluated today he presents with elevated PSA.  He stated he does have polyuria going today and also during night.  Patient also has erectile dysfunction.    Today increase Flomax to 0.4 mg 1 p.o. b.i.d. and refer to Urology.  [place].  Is chronic erectile dysfunction refill Viagra 100 mg as needed for erectile dysfunction   For his chronic dyslipidemia refill Crestor 40 mg 1 p.o. q.day.  Back Pain  Pertinent negatives include no abdominal pain, chest pain or dysuria.     .    Current Medications:    Current Outpatient Medications:     cariprazine (VRAYLAR) 3 mg Cap, Take 3 mg by mouth once daily., Disp: , Rfl:     fluticasone propionate (FLONASE) 50 mcg/actuation nasal spray, 1 spray (50 mcg total) by Each Nostril route once daily., Disp: 16 g, Rfl: 2    zaleplon (SONATA) 10 MG capsule, Take 10 mg by mouth nightly as needed., Disp: , Rfl:     carvediloL (COREG) 6.25 MG tablet, Take 1 tablet (6.25 mg total) by mouth 2 (two) times daily with meals., Disp: 60 tablet, Rfl: 11    celecoxib (CELEBREX) 200 MG capsule, Take 200 mg by mouth daily as needed., Disp: , Rfl:     naproxen (NAPROSYN) 500 MG tablet, Take 1 tablet (500 mg total) by mouth 2 (two) times daily as needed (pain)., Disp: 20 tablet, Rfl: 0    rosuvastatin (CRESTOR) 40 MG Tab, Take 1 tablet (40 mg total) by mouth every evening., Disp: 90 tablet, Rfl: 3    sildenafiL (VIAGRA) 100 MG tablet, Take 1 tablet (100 mg total) by mouth daily as needed for Erectile Dysfunction., Disp: 10 tablet, Rfl: 3    tamsulosin (FLOMAX) 0.4 mg Cap, Take 1 capsule (0.4 mg total) by mouth 2 (two) times a day., Disp: 180 capsule, Rfl: 1           Review of Systems   Constitutional:  Negative for appetite change and fatigue.   HENT:  Negative for nasal congestion and rhinorrhea.    Eyes:  Negative for redness and visual disturbance.  "  Respiratory:  Negative for cough and shortness of breath.    Cardiovascular:  Negative for chest pain and leg swelling.   Gastrointestinal:  Negative for abdominal pain, constipation and diarrhea.   Endocrine: Negative for polyuria.   Genitourinary:  Negative for difficulty urinating, dysuria and erectile dysfunction.   Musculoskeletal:  Positive for back pain. Negative for arthralgias and myalgias.   Integumentary:  Negative for rash and mole/lesion.   All other systems reviewed and are negative.               Vitals:    10/26/23 0943   BP: 137/77   BP Location: Right arm   Patient Position: Sitting   BP Method: Large (Automatic)   Pulse: 80   Resp: 18   Temp: 98 °F (36.7 °C)   TempSrc: Temporal   SpO2: 98%   Weight: 113.9 kg (251 lb)   Height: 6' 4" (1.93 m)        Physical Exam  Vitals and nursing note reviewed.   Constitutional:       Appearance: Normal appearance.   Cardiovascular:      Rate and Rhythm: Normal rate and regular rhythm.      Pulses: Normal pulses.      Heart sounds: Normal heart sounds.   Pulmonary:      Effort: Pulmonary effort is normal.      Breath sounds: Normal breath sounds.   Abdominal:      General: Abdomen is flat. Bowel sounds are normal.      Palpations: Abdomen is soft.   Musculoskeletal:         General: Normal range of motion.   Skin:     General: Skin is warm and dry.   Neurological:      General: No focal deficit present.      Mental Status: He is alert and oriented to person, place, and time. Mental status is at baseline.           Last Labs:     Office Visit on 10/17/2023   Component Date Value    Triglycerides 10/17/2023 89     Cholesterol 10/17/2023 84     HDL Cholesterol 10/17/2023 45     Cholesterol/HDL Ratio (R* 10/17/2023 1.9     Non-HDL 10/17/2023 39     LDL Calculated 10/17/2023 21     VLDL 10/17/2023 18     PSA Total 10/17/2023 4.250 (H)        Last Imaging:  US Arterial Doppler W/Exercise  Narrative: EXAMINATION:  US ARTERIAL DOPPLER W/ EXERCISE    CLINICAL " HISTORY:  Dizziness and giddiness    TECHNIQUE:  Continuous wave Doppler, incline treadmill, pneumatic cuffs    COMPARISON:  None    FINDINGS:  Right brachial pressure 140 mm Hg, JORGE 1.16    Left brachial pressure 146 mm Hg, JORGE 1.25    Left JORGE at rest 1.16, post exercise 0.96, 2 minutes 1.02, 5 minutes 1.04    Left JORGE at rest 1.25, post exercise 0.97, 2 minutes 1.02, 5 minutes 1.04  Impression: ABIs are normal at rest, there is mild decrease  with exercise, quickly returns normal    TECHNOLOGIST: Melva GALLO) () RVT    Electronically signed by: Asmita Matthews  Date:    06/21/2023  Time:    10:09  US Carotid Bilateral  Narrative: EXAMINATION:  US CAROTID BILATERAL    CLINICAL HISTORY:  Dizziness and giddiness    TECHNIQUE:  Grayscale and color Doppler ultrasound examination of the carotid and vertebral artery systems bilaterally.  Stenosis estimates are per the NASCET measurement criteria.    COMPARISON:  None.    FINDINGS:  Right:    Internal Carotid Artery (ICA) peak systolic velocity 57 cm/sec    ICA/CCA peak systolic velocity ratio: 0.72    External carotid 96 centimeters/second    Plaque formation: Mixed    Vertebral artery: Antegrade flow and normal waveform.    Left:    Internal Carotid Artery (ICA)  peak systolic velocity 80 cm/sec    ICA/CCA peak systolic velocity ratio: 0.99    External carotid 53 centimeters/seconds    Plaque formation: Mixed    Vertebral artery: Antegrade flow and normal waveform.  Impression: Bilateral carotids exhibit less than 50% stenosis    TECHNOLOGIST: Melva GALLO) () RVT    Electronically signed by: Asmita Matthews  Date:    06/21/2023  Time:    10:06         **Labs and x-rays personally reviewed by me    ** reviewed      Objective:        Assessment:       1. Benign prostatic hyperplasia, unspecified whether lower urinary tract symptoms present  Ambulatory referral/consult to Urology      2. Primary hypertension  carvediloL (COREG) 6.25 MG tablet      3. Erectile  dysfunction, unspecified erectile dysfunction type             Plan:         @[email or web address]@

## 2023-10-31 ENCOUNTER — HOSPITAL ENCOUNTER (OUTPATIENT)
Dept: RADIOLOGY | Facility: HOSPITAL | Age: 58
Discharge: HOME | End: 2023-10-31
Attending: NURSE PRACTITIONER
Payer: MEDICARE

## 2023-10-31 VITALS — WEIGHT: 250 LBS | BODY MASS INDEX: 30.44 KG/M2 | HEIGHT: 76 IN

## 2023-10-31 DIAGNOSIS — F17.210 CIGARETTE NICOTINE DEPENDENCE WITHOUT COMPLICATION: ICD-10-CM

## 2023-10-31 DIAGNOSIS — Z12.2 SCREENING FOR LUNG CANCER: ICD-10-CM

## 2023-10-31 PROCEDURE — 71271 CT THORAX LUNG CANCER SCR C-: CPT | Mod: 26,,, | Performed by: RADIOLOGY

## 2023-10-31 PROCEDURE — 71271 CT CHEST LUNG SCREENING LOW DOSE: ICD-10-PCS | Mod: 26,,, | Performed by: RADIOLOGY

## 2023-10-31 PROCEDURE — 71271 CT THORAX LUNG CANCER SCR C-: CPT | Mod: TC

## 2023-12-13 ENCOUNTER — OFFICE VISIT (OUTPATIENT)
Dept: FAMILY MEDICINE | Facility: CLINIC | Age: 58
End: 2023-12-13
Payer: MEDICARE

## 2023-12-13 VITALS
RESPIRATION RATE: 17 BRPM | WEIGHT: 252.19 LBS | HEART RATE: 76 BPM | HEIGHT: 76 IN | SYSTOLIC BLOOD PRESSURE: 108 MMHG | TEMPERATURE: 98 F | BODY MASS INDEX: 30.71 KG/M2 | DIASTOLIC BLOOD PRESSURE: 74 MMHG | OXYGEN SATURATION: 97 %

## 2023-12-13 DIAGNOSIS — M16.0 OSTEOARTHRITIS OF BOTH HIPS, UNSPECIFIED OSTEOARTHRITIS TYPE: Primary | ICD-10-CM

## 2023-12-13 DIAGNOSIS — G89.29 CHRONIC BILATERAL LOW BACK PAIN WITH LEFT-SIDED SCIATICA: ICD-10-CM

## 2023-12-13 DIAGNOSIS — M54.42 CHRONIC BILATERAL LOW BACK PAIN WITH LEFT-SIDED SCIATICA: ICD-10-CM

## 2023-12-13 PROCEDURE — 3074F SYST BP LT 130 MM HG: CPT | Mod: ,,, | Performed by: INTERNAL MEDICINE

## 2023-12-13 PROCEDURE — 3008F BODY MASS INDEX DOCD: CPT | Mod: ,,, | Performed by: INTERNAL MEDICINE

## 2023-12-13 PROCEDURE — 3078F PR MOST RECENT DIASTOLIC BLOOD PRESSURE < 80 MM HG: ICD-10-PCS | Mod: ,,, | Performed by: INTERNAL MEDICINE

## 2023-12-13 PROCEDURE — 1159F PR MEDICATION LIST DOCUMENTED IN MEDICAL RECORD: ICD-10-PCS | Mod: ,,, | Performed by: INTERNAL MEDICINE

## 2023-12-13 PROCEDURE — 96372 PR INJECTION,THERAP/PROPH/DIAG2ST, IM OR SUBCUT: ICD-10-PCS | Mod: ,,, | Performed by: INTERNAL MEDICINE

## 2023-12-13 PROCEDURE — 3078F DIAST BP <80 MM HG: CPT | Mod: ,,, | Performed by: INTERNAL MEDICINE

## 2023-12-13 PROCEDURE — 96372 THER/PROPH/DIAG INJ SC/IM: CPT | Mod: ,,, | Performed by: INTERNAL MEDICINE

## 2023-12-13 PROCEDURE — 99214 OFFICE O/P EST MOD 30 MIN: CPT | Mod: 25,,, | Performed by: INTERNAL MEDICINE

## 2023-12-13 PROCEDURE — 1159F MED LIST DOCD IN RCRD: CPT | Mod: ,,, | Performed by: INTERNAL MEDICINE

## 2023-12-13 PROCEDURE — 1160F RVW MEDS BY RX/DR IN RCRD: CPT | Mod: ,,, | Performed by: INTERNAL MEDICINE

## 2023-12-13 PROCEDURE — 1160F PR REVIEW ALL MEDS BY PRESCRIBER/CLIN PHARMACIST DOCUMENTED: ICD-10-PCS | Mod: ,,, | Performed by: INTERNAL MEDICINE

## 2023-12-13 PROCEDURE — 99214 PR OFFICE/OUTPT VISIT, EST, LEVL IV, 30-39 MIN: ICD-10-PCS | Mod: 25,,, | Performed by: INTERNAL MEDICINE

## 2023-12-13 PROCEDURE — 3074F PR MOST RECENT SYSTOLIC BLOOD PRESSURE < 130 MM HG: ICD-10-PCS | Mod: ,,, | Performed by: INTERNAL MEDICINE

## 2023-12-13 PROCEDURE — 3008F PR BODY MASS INDEX (BMI) DOCUMENTED: ICD-10-PCS | Mod: ,,, | Performed by: INTERNAL MEDICINE

## 2023-12-13 RX ORDER — KETOROLAC TROMETHAMINE 30 MG/ML
15 INJECTION, SOLUTION INTRAMUSCULAR; INTRAVENOUS
Status: COMPLETED | OUTPATIENT
Start: 2023-12-13 | End: 2023-12-13

## 2023-12-13 RX ADMIN — KETOROLAC TROMETHAMINE 15 MG: 30 INJECTION, SOLUTION INTRAMUSCULAR; INTRAVENOUS at 10:12

## 2023-12-21 PROBLEM — M16.0 OSTEOARTHRITIS OF BOTH HIPS: Status: ACTIVE | Noted: 2023-12-21

## 2023-12-21 NOTE — PROGRESS NOTES
Subjective:       Patient ID: Hola Burrell is a 58 y.o. male.    Chief Complaint: Hip Pain (Left ) and Back Pain    Hip Pain     Back Pain    Patient complains of pain in both hips patient complains of worsening back pain.  Patient rates the 10/10.  The plan will be Toradol 15 mg IM.  For the Orthopedics.  Patient does have osteoarthritis of the hip and chronic low back pain.  Patient also has cardiac problems and would like to see another cardiologist refer the patient to Dr. Mitchell for cardiac problems 12 point review of system has been done it is unremarkable.  .    Current Medications:    Current Outpatient Medications:     cariprazine (VRAYLAR) 3 mg Cap, Take 3 mg by mouth once daily., Disp: , Rfl:     carvediloL (COREG) 6.25 MG tablet, Take 1 tablet (6.25 mg total) by mouth 2 (two) times daily with meals., Disp: 60 tablet, Rfl: 11    celecoxib (CELEBREX) 200 MG capsule, Take 200 mg by mouth daily as needed., Disp: , Rfl:     fluticasone propionate (FLONASE) 50 mcg/actuation nasal spray, 1 spray (50 mcg total) by Each Nostril route once daily., Disp: 16 g, Rfl: 2    naproxen (NAPROSYN) 500 MG tablet, Take 1 tablet (500 mg total) by mouth 2 (two) times daily as needed (pain)., Disp: 20 tablet, Rfl: 0    rosuvastatin (CRESTOR) 40 MG Tab, Take 1 tablet (40 mg total) by mouth every evening., Disp: 90 tablet, Rfl: 3    sildenafiL (VIAGRA) 100 MG tablet, Take 1 tablet (100 mg total) by mouth daily as needed for Erectile Dysfunction., Disp: 10 tablet, Rfl: 3    tamsulosin (FLOMAX) 0.4 mg Cap, Take 1 capsule (0.4 mg total) by mouth 2 (two) times a day., Disp: 180 capsule, Rfl: 1    zaleplon (SONATA) 10 MG capsule, Take 10 mg by mouth nightly as needed., Disp: , Rfl:            Review of Systems   Musculoskeletal:  Positive for back pain.                Vitals:    12/13/23 0907   BP: 108/74   BP Location: Right arm   Patient Position: Sitting   BP Method: Large (Automatic)   Pulse: 76   Resp: 17   Temp: 97.8  "°F (36.6 °C)   TempSrc: Temporal   SpO2: 97%   Weight: 114.4 kg (252 lb 3.2 oz)   Height: 6' 4" (1.93 m)        Physical Exam  Vitals and nursing note reviewed.   Constitutional:       Appearance: Normal appearance.   Cardiovascular:      Rate and Rhythm: Normal rate and regular rhythm.      Pulses: Normal pulses.      Heart sounds: Normal heart sounds.   Pulmonary:      Effort: Pulmonary effort is normal.      Breath sounds: Normal breath sounds.   Abdominal:      General: Abdomen is flat. Bowel sounds are normal.      Palpations: Abdomen is soft.   Musculoskeletal:         General: Normal range of motion.   Skin:     General: Skin is warm and dry.   Neurological:      General: No focal deficit present.      Mental Status: He is alert and oriented to person, place, and time. Mental status is at baseline.           Last Labs:     No visits with results within 1 Month(s) from this visit.   Latest known visit with results is:   Office Visit on 10/17/2023   Component Date Value    Triglycerides 10/17/2023 89     Cholesterol 10/17/2023 84     HDL Cholesterol 10/17/2023 45     Cholesterol/HDL Ratio (R* 10/17/2023 1.9     Non-HDL 10/17/2023 39     LDL Calculated 10/17/2023 21     VLDL 10/17/2023 18     PSA Total 10/17/2023 4.250 (H)        Last Imaging:  CT Chest Lung Screening Low Dose  Narrative: EXAMINATION:  CT CHEST LUNG SCREENING LOW DOSE    CLINICAL HISTORY:  Lung cancer screening, >= 30 pk-yr current smoker (Age 55-80y); Encounter for screening for malignant neoplasm of respiratory organs    TECHNIQUE:  CT of the thorax was performed with low dose, lung screening protocol.  No contrast was administered.  Sagittal and coronal reconstructions were obtained.    COMPARISON:  July 15, 2020    FINDINGS:  Heart size appears within normal limits.  Chronic/granulomatous change without focal consolidation, pleural effusion, or pneumothorax.  A few noncalcified pulmonary nodular densities are present which measure 2-3 mm or " less.  No new or progressive pulmonary nodule.  Mild emphysematous change of the lungs present.  Visualized upper abdomen demonstrates no acute abnormality.  Scattered skeletal degenerative change.  Impression: Lung-RADS Category:  2 - Benign Appearance or Behavior - continue annual screening with LDCT in 12 months.    Clinically or potentially clinically significant non lung cancer finding:  None.    Prior Lung Cancer Modifier:  No history of prior lung cancer.    This report is based on the LungRADS algorithm. This study was designed exclusively for lung cancer screening and nodule detection. The image quality is not intended to identify pathology in other organs and should not be used in lieu of a diagnostic CT for complete evaluation of the chest.    The CT exam was performed using one or more of the following dose    reduction techniques- Automated exposure control, adjustment of the mA and/or kV according to patient size, and/or use of iterative    reconstructed technique.    Electronically signed by: Artur West  Date:    10/31/2023  Time:    14:03         **Labs and x-rays personally reviewed by me    ** reviewed      Objective:        Assessment:       1. Osteoarthritis of both hips, unspecified osteoarthritis type  ketorolac injection 15 mg    Ambulatory referral/consult to Orthopedics      2. Chronic bilateral low back pain with left-sided sciatica             Plan:         1. Osteoarthritis of both hips, unspecified osteoarthritis type  -     ketorolac injection 15 mg  -     Ambulatory referral/consult to Orthopedics; Future; Expected date: 12/20/2023    2. Chronic bilateral low back pain with left-sided sciatica

## 2024-01-09 DIAGNOSIS — Z71.89 COMPLEX CARE COORDINATION: ICD-10-CM

## 2024-01-10 DIAGNOSIS — M25.551 BILATERAL HIP PAIN: Primary | ICD-10-CM

## 2024-01-10 DIAGNOSIS — M25.552 BILATERAL HIP PAIN: Primary | ICD-10-CM

## 2024-01-16 ENCOUNTER — HOSPITAL ENCOUNTER (OUTPATIENT)
Dept: RADIOLOGY | Facility: HOSPITAL | Age: 59
Discharge: HOME | End: 2024-01-16
Attending: ORTHOPAEDIC SURGERY
Payer: MEDICARE

## 2024-01-16 ENCOUNTER — OFFICE VISIT (OUTPATIENT)
Dept: ORTHOPEDICS | Facility: CLINIC | Age: 59
End: 2024-01-16
Payer: MEDICARE

## 2024-01-16 VITALS — BODY MASS INDEX: 29.22 KG/M2 | HEIGHT: 76 IN | WEIGHT: 240 LBS

## 2024-01-16 DIAGNOSIS — M25.552 BILATERAL HIP PAIN: ICD-10-CM

## 2024-01-16 DIAGNOSIS — M16.0 OSTEOARTHRITIS OF BOTH HIPS, UNSPECIFIED OSTEOARTHRITIS TYPE: ICD-10-CM

## 2024-01-16 DIAGNOSIS — M25.551 BILATERAL HIP PAIN: ICD-10-CM

## 2024-01-16 DIAGNOSIS — G57.00 PIRIFORMIS SYNDROME, UNSPECIFIED LATERALITY: Primary | ICD-10-CM

## 2024-01-16 PROCEDURE — 73522 X-RAY EXAM HIPS BI 3-4 VIEWS: CPT | Mod: 26,,, | Performed by: ORTHOPAEDIC SURGERY

## 2024-01-16 PROCEDURE — 73522 X-RAY EXAM HIPS BI 3-4 VIEWS: CPT | Mod: TC

## 2024-01-16 PROCEDURE — 99213 OFFICE O/P EST LOW 20 MIN: CPT | Mod: PBBFAC | Performed by: ORTHOPAEDIC SURGERY

## 2024-01-16 PROCEDURE — 3008F BODY MASS INDEX DOCD: CPT | Mod: CPTII,,, | Performed by: ORTHOPAEDIC SURGERY

## 2024-01-16 PROCEDURE — 99204 OFFICE O/P NEW MOD 45 MIN: CPT | Mod: S$PBB,,, | Performed by: ORTHOPAEDIC SURGERY

## 2024-01-16 NOTE — PROGRESS NOTES
ASSESSMENT:      ICD-10-CM ICD-9-CM   1. Piriformis syndrome, unspecified laterality  G57.00 355.0   2. Osteoarthritis of both hips, unspecified osteoarthritis type  M16.0 715.95       PLAN:     -Findings and treatment options were discussed with the patient  -All questions answered  Natural history and expected course discussed. Questions answered.  Educational materials distributed.  Home exercises discussed.  PT referral.  Issues appeared to be more related to piriformis syndrome as opposed to intra-articular hip pathology.  May ultimately require an injection into the piriformis space around the sciatic nerve from her pain management specialist.  In the interim will start physical therapy which should be beneficial.  He understands this is not a surgical issue.  He voiced understanding will follow up as needed  There are no Patient Instructions on file for this visit.    IMAGING:  X-Ray Hip 3 or 4 views Bilateral    Result Date: 1/16/2024  See Procedure Notes for results. IMPRESSION: Please see Ortho procedure notes for report.  This procedure was auto-finalized by: Virtual Radiologist   Two views of the right hip two views left hip and an AP of the pelvis were obtained today demonstrating well-preserved hip joint spaces.  There has no signs of fracture dislocation or pathologic bone.        CC: Hip pain  59 y.o. Male who presents as a new patient to me for evaluation of bilateral hip pain. States he has been in pain for several years.  Pain has become worse since falling off a ladder and landing on the right hip a few days prior    Pain has been present for several years.  Mechanism of injury:   Location of the pain: buttock  Occupation: Disability  Mechanical symptoms, such as catching and popping of the hip are present.    Symptoms are worsened with activity.  Better with rest. Treatment thus far has included rest, activity modifications, and oral medications.    he has not  had formal physical therapy  he  has not had previous advanced imaging such as MRI.   he has not  had previous hip injections.   he has not  had previous hip or back surgery.   Here today to discuss diagnosis and treatment options.        Review of Systems  All other review of symptoms were reviewed and found to be noncontributory.     PAST MEDICAL HISTORY:   Past Medical History:   Diagnosis Date    Alcohol dependence with uncomplicated intoxication     Asthma     Bilateral primary osteoarthritis of hip     Bipolar disorder     Drug use     Herpes simplex     hyperlipidemia     Hypertension     Syncope and collapse        PAST SURGICAL HISTORY:   History reviewed. No pertinent surgical history.    FAMILY HISTORY:   Family History   Problem Relation Age of Onset    Cancer Mother     Cancer Father     Cancer Sister     Mental illness Sister     Cancer Brother     Diabetes Maternal Aunt     Diabetes Maternal Uncle     Hypertension Maternal Grandfather        SOCIAL HISTORY:   Social History     Socioeconomic History    Marital status: Single   Tobacco Use    Smoking status: Every Day     Current packs/day: 1.50     Average packs/day: 1.5 packs/day for 40.0 years (60.0 ttl pk-yrs)     Types: Cigarettes     Passive exposure: Past    Smokeless tobacco: Current    Tobacco comments:     vapoing   Substance and Sexual Activity    Alcohol use: Not Currently    Drug use: Yes     Types: Marijuana    Sexual activity: Not Currently     Social Determinants of Health     Financial Resource Strain: High Risk (10/17/2023)    Overall Financial Resource Strain (CARDIA)     Difficulty of Paying Living Expenses: Very hard   Food Insecurity: Food Insecurity Present (10/17/2023)    Hunger Vital Sign     Worried About Running Out of Food in the Last Year: Often true     Ran Out of Food in the Last Year: Often true   Transportation Needs: No Transportation Needs (10/17/2023)    PRAPARE - Transportation     Lack of Transportation (Medical): No     Lack of Transportation  (Non-Medical): No   Physical Activity: Inactive (10/17/2023)    Exercise Vital Sign     Days of Exercise per Week: 0 days     Minutes of Exercise per Session: 0 min   Stress: Stress Concern Present (10/17/2023)    [place] [employer] - Occupational Stress Questionnaire     Feeling of Stress : To some extent   Social Connections: Socially Isolated (10/17/2023)    Social Connection and Isolation Panel [NHANES]     Frequency of Communication with Friends and Family: More than three times a week     Frequency of Social Gatherings with Friends and Family: Once a week     Attends [employer] Services: Never     Active Member of Clubs or Organizations: No     Attends Club or Organization Meetings: Never     Marital Status: Never    Housing Stability: Low Risk  (10/17/2023)    Housing Stability Vital Sign     Unable to Pay for Housing in the Last Year: No     Number of Places Lived in the Last Year: 1     Unstable Housing in the Last Year: No       MEDICATIONS:     Current Outpatient Medications:     cariprazine (VRAYLAR) 3 mg Cap, Take 3 mg by mouth once daily., Disp: , Rfl:     carvediloL (COREG) 6.25 MG tablet, Take 1 tablet (6.25 mg total) by mouth 2 (two) times daily with meals., Disp: 60 tablet, Rfl: 11    celecoxib (CELEBREX) 200 MG capsule, Take 200 mg by mouth daily as needed., Disp: , Rfl:     fluticasone propionate (FLONASE) 50 mcg/actuation nasal spray, 1 spray (50 mcg total) by Each Nostril route once daily., Disp: 16 g, Rfl: 2    naproxen (NAPROSYN) 500 MG tablet, Take 1 tablet (500 mg total) by mouth 2 (two) times daily as needed (pain)., Disp: 20 tablet, Rfl: 0    rosuvastatin (CRESTOR) 40 MG Tab, Take 1 tablet (40 mg total) by mouth every evening., Disp: 90 tablet, Rfl: 3    sildenafiL (VIAGRA) 100 MG tablet, Take 1 tablet (100 mg total) by mouth daily as needed for Erectile Dysfunction., Disp: 10 tablet, Rfl: 3    tamsulosin (FLOMAX) 0.4 mg Cap, Take 1 capsule (0.4 mg total) by  "mouth 2 (two) times a day., Disp: 180 capsule, Rfl: 1    zaleplon (SONATA) 10 MG capsule, Take 10 mg by mouth nightly as needed., Disp: , Rfl:     ALLERGIES:   Review of patient's allergies indicates:   Allergen Reactions    Desyrel [trazodone]         PHYSICAL EXAMINATION:  Ht 6' 4" (1.93 m)   Wt 108.9 kg (240 lb)   BMI 29.21 kg/m²   General    Nursing note and vitals reviewed.  Constitutional: He is oriented to person, place, and time. He appears well-developed and well-nourished.   HENT:   Head: Normocephalic and atraumatic.   Nose: Nose normal.   Eyes: EOM are normal. Pupils are equal, round, and reactive to light.   Neck: Neck supple.   Cardiovascular:  Normal rate and intact distal pulses.            Pulmonary/Chest: Effort normal. No respiratory distress. He exhibits no tenderness.   Abdominal: Soft. He exhibits no distension. There is no abdominal tenderness.   Neurological: He is alert and oriented to person, place, and time. He has normal reflexes.   Psychiatric: He has a normal mood and affect. His behavior is normal. Judgment and thought content normal.     General Musculoskeletal Exam   Gait: Trendelenburg     Left Hip Exam     Range of Motion   Abduction:  normal   Adduction:  normal   Extension:  normal   Flexion:  normal   External rotation:  normal   Internal rotation: normal     Tests   Pain w/ forced internal rotation (TRISTEN): absent  Pain w/ forced external rotation (FADIR): absent    Other   Sensation: normal          Muscle Strength   Right Lower Extremity   Hip Abduction: 4/5   Left Lower Extremity   Hip Abduction: 4/5         Orders Placed This Encounter   Procedures    Ambulatory referral/consult to Physical/Occupational Therapy     Standing Status:   Future     Standing Expiration Date:   2/16/2025     Referral Priority:   Routine     Referral Type:   Physical Medicine     Referral Reason:   Specialty Services Required     Number of Visits Requested:   1     Procedures      "

## 2024-01-31 ENCOUNTER — CLINICAL SUPPORT (OUTPATIENT)
Dept: REHABILITATION | Facility: HOSPITAL | Age: 59
End: 2024-01-31
Payer: MEDICARE

## 2024-01-31 DIAGNOSIS — R26.9 GAIT DISTURBANCE: ICD-10-CM

## 2024-01-31 DIAGNOSIS — M25.651 STIFFNESS OF RIGHT HIP JOINT: ICD-10-CM

## 2024-01-31 DIAGNOSIS — G57.00 PIRIFORMIS SYNDROME, UNSPECIFIED LATERALITY: Primary | ICD-10-CM

## 2024-01-31 PROCEDURE — 97161 PT EVAL LOW COMPLEX 20 MIN: CPT

## 2024-01-31 PROCEDURE — 97110 THERAPEUTIC EXERCISES: CPT

## 2024-01-31 NOTE — PLAN OF CARE
"[facility]   Physical Therapy Initial Evaluation      Name: Hola Burrell  Clinic Number: [id number]    Therapy Diagnosis:   Encounter Diagnosis   Name Primary?    Piriformis syndrome, unspecified laterality         Physician: John Cochran MD    Physician Orders: PT Eval and Treat   Medical Diagnosis from Referral: see above  Evaluation Date: 1/31/2024  Authorization Period Expiration: n/a  Plan of Care Expiration: 3/29/2024    Date of Surgery: n/a  Visit # / Visits authorized: 1/ no limits   FOTO: 1/ 3    Precautions: Standard     Time In: 9:15 am  Time Out: 9:57 am  Total Billable Time: 42 minutes    Subjective     Date of onset: chronic - >2 years     History of current condition - Hola reports: fell off a ladder about 3 weeks ago and landed on right side - but states he was having some hip pain for the last couple of yeras    Falls: fell off a ladder about 3 weeks ago    Imaging: xrays - negative    Prior Therapy: "he thinks he had some for hips before but can't remember when"  Social History: lives alone  Occupation: does not work - on disability  Prior Level of Function: independent   Current Level of Function: independent w/ activiites of daily living     Pain:  Current 1/10, worst 7/10, best 0/10   Location: right buttocks and hip  Description: Aching, Throbbing, Grabbing, Tight, and Variable  Aggravating Factors: Standing, Walking, and strenuous activity  Easing Factors: rest and Naproxen    Patients goals: get rid of hip pain     Medical History:   Past Medical History:   Diagnosis Date    Alcohol dependence with uncomplicated intoxication     Asthma     Bilateral primary osteoarthritis of hip     Bipolar disorder     Drug use     Herpes simplex     hyperlipidemia     Hypertension     Syncope and collapse        Surgical History:   Hola Burrell  has no past surgical history on file.    Medications:   Hola has a current medication list which includes the " following prescription(s): vraylar, carvedilol, celecoxib, fluticasone propionate, naproxen, rosuvastatin, sildenafil, tamsulosin, and zaleplon.    Allergies:   Review of patient's allergies indicates:   Allergen Reactions    Desyrel [trazodone]         Objective      Clinical Special Tests:  A. Hip  1. Leg length: NT  2. Asaf test: right Negative left Negative  3. Hip scour test: right Negative left Negative  4. Piriformis test: right Positive left Positive  5. Josh's test: right Negative left Negative  6. TRISTEN test: right positive; left positive    Range of motion:  Motion Right Left    Hip flexion  WITHIN FUNCTIONAL LIMITS  WITHIN FUNCTIONAL LIMITS   Hip extension  10 degrees   10 degrees    Hip abduction  WITHIN FUNCTIONAL LIMITS  WITHIN FUNCTIONAL LIMITS   Hip adduction  WITHIN FUNCTIONAL LIMITS  WITHIN FUNCTIONAL LIMITS   Internal rotation  20 degrees   30 degrees    External rotation  45 degrees   45 degrees    Knee extension  WITHIN FUNCTIONAL LIMITS  WITHIN FUNCTIONAL LIMITS   Knee flexion  WITHIN FUNCTIONAL LIMITS  WITHIN FUNCTIONAL LIMITS       Manual muscle test   Muscle Right  Left    Hip flexion  MMT strength: 4/5  MMT strength: 4/5   Hip extension  MMT strength: 3+/5  MMT strength: 4-/5   Hip abduction  MMT strength: 4/5  MMT strength: 4/5   Hip adduction  MMT strength: 4/5  MMT strength: 4/5   Hip internal rotation  NT  NT   Hip external rotation  NT  NT   Knee extension  MMT strength: 5/5  MMT strength: 5/5   Knee flexion  MMT strength: 5/5  MMT strength: 5/5       Gait:  Weight bearing precautions: FWB  Assistive device: none  Ambulation distance and deviations: mild antalgia noted on right  Stairs: reports he is able but says it is slow - lives in a camper   Comments:        Intake Outcome Measure for FOTO Hip Survey    Therapist reviewed FOTO scores for Hola Burrell on 1/31/2024.   FOTO report - see Media section or FOTO account episode details.    Intake Score: 43         Treatment      Total Treatment time (time-based codes) separate from Evaluation: 10 minutes     Hola received the treatments listed below:      Bridge with abduction w/ blue band, hooklying marching w/ blue band, supine figure 4 piriformis stretch using towel, and seated hamstring stretch    Patient Education and Home Exercises     Education provided:   - evaluation results, plan of care and home exercise program     Written Home Exercises Provided: yes. Exercises were reviewed and Hola was able to demonstrate them prior to the end of the session.  Hola demonstrated good  understanding of the education provided. See EMR under Patient Instructions for exercises provided during therapy sessions.    Assessment     Hola is a 59 y.o. male referred to outpatient Physical Therapy with a medical diagnosis of piriformis syndrome. Patient presents with complaints of right hip/buttock pain after falling off a ladder about 3 weeks ago. He is stiff throughout the right hip with slight decrease in strength. He has increased pain with standing and walking and is having difficulty going up and down the stairs to his camper. He would benefit from skilled therapy to improve strength, functional mobility and decrease pain.    Patient prognosis is Good.   Patient will benefit from skilled outpatient Physical Therapy to address the deficits stated above and in the chart below, provide patient /family education, and to maximize patientt's level of independence.     Plan of care discussed with patient: Yes  Patient's spiritual, cultural and educational needs considered and patient is agreeable to the plan of care and goals as stated below:     Anticipated Barriers for therapy: none    Medical Necessity is demonstrated by the following  History  Co-morbidities and personal factors that may impact the plan of care [x] LOW: no personal factors / co-morbidities  [] MODERATE: 1-2 personal factors / co-morbidities  [] HIGH: 3+ personal factors  / co-morbidities    Moderate / High Support Documentation:   Co-morbidities affecting plan of care: none    Personal Factors:   no deficits     Examination  Body Structures and Functions, activity limitations and participation restrictions that may impact the plan of care [x] LOW: addressing 1-2 elements  [] MODERATE: 3+ elements  [] HIGH: 4+ elements (please support below)    Moderate / High Support Documentation: n/a     Clinical Presentation [x] LOW: stable  [] MODERATE: Evolving  [] HIGH: Unstable     Decision Making/ Complexity Score: low       Goals:  Patient will be independent with home exercise program to facilitate carryover between visits.  Patient will increase bilateral hip strength to 5/5 to improve stability with gait and activiites of daily living.  Patient will be able to go up/down stairs reciprocally with 1 rail as needed to be able to get in and out of his camper safely.  Patient will be able to stand/walk > 30 minutes with </= 2/10 pain in right hip/buttock for improved ability to perform activiites of daily living.  Patient will improve right hip range of motion by 10 degrees each way for improved mobility.      Plan     Plan of care Certification: 1/31/2024 to 3/29/2024.    Outpatient Physical Therapy 2 times weekly for 8 weeks to include the following interventions: Manual Therapy, Moist Heat/ Ice, Neuromuscular Re-ed, Patient Education, Therapeutic Activities, and Therapeutic Exercise.     ÁNGEL HO, PT        Physician's Signature: _________________________________________ Date: ________________

## 2024-02-05 ENCOUNTER — CLINICAL SUPPORT (OUTPATIENT)
Dept: REHABILITATION | Facility: HOSPITAL | Age: 59
End: 2024-02-05
Payer: MEDICARE

## 2024-02-05 DIAGNOSIS — M25.651 STIFFNESS OF RIGHT HIP JOINT: ICD-10-CM

## 2024-02-05 DIAGNOSIS — G57.00 PIRIFORMIS SYNDROME, UNSPECIFIED LATERALITY: Primary | ICD-10-CM

## 2024-02-05 PROBLEM — R26.9 GAIT DISTURBANCE: Status: ACTIVE | Noted: 2024-02-05

## 2024-02-05 PROCEDURE — 97150 GROUP THERAPEUTIC PROCEDURES: CPT | Mod: CQ

## 2024-02-05 PROCEDURE — 97110 THERAPEUTIC EXERCISES: CPT | Mod: CQ

## 2024-02-05 PROCEDURE — 97530 THERAPEUTIC ACTIVITIES: CPT | Mod: CQ,59

## 2024-02-05 PROCEDURE — 97112 NEUROMUSCULAR REEDUCATION: CPT | Mod: CQ

## 2024-02-05 NOTE — PROGRESS NOTES
[facility]   Physical Therapy Treatment Note      Name: Hola Payton [facility] Number: [id number]    Therapy Diagnosis: No diagnosis found.  Physician: John Cochran MD    Physician Orders: PT Eval and Treat   Medical Diagnosis from Referral: see above  Evaluation Date: 1/31/2024  Authorization Period Expiration: n/a  Plan of Care Expiration: 3/29/2024     Date of Surgery: n/a  Visit # / Visits authorized: 1/ no limits   FOTO: 1/ 3  Visit Date: 2/5/2024      PTA Visit #: 1/5     Time In: 7:45 am  Time Out: 8:30 am  Total Billable Time: 41 individual minutes (4 min group)    Subjective     Pt reports: No pain in R hip on arrival.  He was compliant with home exercise program.  Response to previous treatment: first since eval  Functional change: none identified    Pain: 0/10  Location: right hip      Objective      Objective Measures updated at progress report unless specified.     Treatment     Hola received the treatments listed below:      therapeutic exercises to develop strength, endurance, ROM, flexibility, posture, and core stabilization for 25 minutes including:  Hooklying Clam Shells--Green band--3 x 10  Piriformis stretch--3 x 30 sec  Figure 4 stretch--3 x 30 sec  SL Hip Abd--Green band--3 x 10 -Charged as Group   IT band with strap--3 x 30 sec    manual therapy techniques: Joint mobilizations, Manual traction, Myofacial release, Manual Lymphatic Drainage, Soft tissue Mobilization, and Friction Massage were applied to the: R hip for 0 minutes, including:  -    neuromuscular re-education activities to improve: Balance, Coordination, Kinesthetic, Sense, Proprioception, and Posture for 8 minutes. The following activities were included:  Bridges with abduction--green band--3 x 10    therapeutic activities to improve functional performance for 8  minutes, including:  Bike x 8 min    Patient Education and Home Exercises       Education provided:   - review of home exercise  program and current Plan of Care/rational of treatment.    Written Home Exercises Provided: Patient instructed to cont prior HEP. Exercises were reviewed and Hola was able to demonstrate them prior to the end of the session.  Hloa demonstrated good understanding of the education provided. See EMR under Patient Instructions for exercises provided during therapy sessions    Assessment     At Evaluation:  Hola is a 59 y.o. male referred to outpatient Physical Therapy with a medical diagnosis of piriformis syndrome. Patient presents with complaints of right hip/buttock pain after falling off a ladder about 3 weeks ago. He is stiff throughout the right hip with slight decrease in strength. He has increased pain with standing and walking and is having difficulty going up and down the stairs to his camper. He would benefit from skilled therapy to improve strength, functional mobility and decrease pain.       Current Assessment:  Patient arrives without pain in R hip.  Reports that it is steadily getting better. Reports Ind with HEP.  Session focused on lateral and posterior hip stretching and strengthening.  No pain with session.  Will cont POC and progress as able    Hola Is progressing towards his goals.   Pt prognosis is Good.     Pt will continue to benefit from skilled outpatient physical therapy to address the deficits listed in the problem list box on initial evaluation, provide pt/family education and to maximize pt's level of independence in the home and community environment.     Pt's spiritual, cultural and educational needs considered and pt agreeable to plan of care and goals.     Anticipated barriers to physical therapy: none    Goals:    Patient will be independent with home exercise program to facilitate carryover between visits.  2.  Patient will increase bilateral hip strength to 5/5 to improve stability with gait and activiites of daily living.  3.  Patient will be able to go up/down stairs  reciprocally with 1 rail as needed to be able to get in and out of his camper safely.  4.  Patient will be able to stand/walk > 30 minutes with </= 2/10 pain in right hip/buttock for improved ability to perform activiites of daily living.  5.  Patient will improve right hip range of motion by 10 degrees each way for improved mobility.    Plan     Plan of care Certification: 1/31/2024 to 3/29/2024.     Outpatient Physical Therapy 2 times weekly for 8 weeks to include the following interventions: Manual Therapy, Moist Heat/ Ice, Neuromuscular Re-ed, Patient Education, Therapeutic Activities, and Therapeutic Exercise.     Zacarias Newman, PTA   02/05/2024

## 2024-02-12 ENCOUNTER — CLINICAL SUPPORT (OUTPATIENT)
Dept: REHABILITATION | Facility: HOSPITAL | Age: 59
End: 2024-02-12
Payer: MEDICARE

## 2024-02-12 DIAGNOSIS — M25.651 STIFFNESS OF RIGHT HIP JOINT: ICD-10-CM

## 2024-02-12 DIAGNOSIS — R26.9 GAIT DISTURBANCE: ICD-10-CM

## 2024-02-12 DIAGNOSIS — G57.00 PIRIFORMIS SYNDROME, UNSPECIFIED LATERALITY: Primary | ICD-10-CM

## 2024-02-12 PROCEDURE — 97110 THERAPEUTIC EXERCISES: CPT | Mod: CQ

## 2024-02-12 PROCEDURE — 97530 THERAPEUTIC ACTIVITIES: CPT | Mod: CQ

## 2024-02-12 PROCEDURE — 97112 NEUROMUSCULAR REEDUCATION: CPT | Mod: CQ

## 2024-02-12 NOTE — PROGRESS NOTES
[facility]   Physical Therapy Treatment Note      Name: Hola Payton [facility] Number: [id number]    Therapy Diagnosis:   Encounter Diagnoses   Name Primary?    Piriformis syndrome, unspecified laterality Yes    Stiffness of right hip joint     Gait disturbance      Physician: John Cochran MD    Physician Orders: PT Eval and Treat   Medical Diagnosis from Referral: see above  Evaluation Date: 1/31/2024  Authorization Period Expiration: n/a  Plan of Care Expiration: 3/29/2024     Date of Surgery: n/a  Visit # / Visits authorized: 3/ no limits   FOTO: 1/ 3  Visit Date: 2/12/2024      PTA Visit #: 2/5     Time In: 7:40 am  Time Out: 8:25 am  Total Billable Time: 45 individual minutes    Subjective     Pt reports: No pain in R hip on arrival.  He was compliant with home exercise program.  Response to previous treatment: less pain  Functional change: none identified    Pain: 0/10  Location: right hip      Objective      Objective Measures updated at progress report unless specified.     Treatment     Hola received the treatments listed below:      therapeutic exercises to develop strength, endurance, ROM, flexibility, posture, and core stabilization for 29 minutes including:  Hooklying Clam Shells--Green band--3 x 10  Piriformis stretch--3 x 30 sec  Figure 4 stretch--3 x 30 sec  SL Hip Abd--Green band--3 x 10   IT band with strap--3 x 30 sec    manual therapy techniques: Joint mobilizations, Manual traction, Myofacial release, Manual Lymphatic Drainage, Soft tissue Mobilization, and Friction Massage were applied to the: R hip for 0 minutes, including:  -    neuromuscular re-education activities to improve: Balance, Coordination, Kinesthetic, Sense, Proprioception, and Posture for 8 minutes. The following activities were included:  Bridges with abduction--green band--3 x 10    therapeutic activities to improve functional performance for 8  minutes, including:  Bike x 8  min    Patient Education and Home Exercises       Education provided:   - review of home exercise program and current Plan of Care/rational of treatment.    Written Home Exercises Provided: Patient instructed to cont prior HEP. Exercises were reviewed and Hola was able to demonstrate them prior to the end of the session.  Hola demonstrated good understanding of the education provided. See EMR under Patient Instructions for exercises provided during therapy sessions    Assessment     At Evaluation:  Hola is a 59 y.o. male referred to outpatient Physical Therapy with a medical diagnosis of piriformis syndrome. Patient presents with complaints of right hip/buttock pain after falling off a ladder about 3 weeks ago. He is stiff throughout the right hip with slight decrease in strength. He has increased pain with standing and walking and is having difficulty going up and down the stairs to his camper. He would benefit from skilled therapy to improve strength, functional mobility and decrease pain.       Current Assessment:  Patient arrives without pain in R hip.  Reports that it is steadily getting better. Reports Ind with HEP.  Session focused on lateral and posterior hip stretching and strengthening.  No pain with session.  Will cont POC and progress as able.  Will try and progress to machines next visit    Hola Is progressing towards his goals.   Pt prognosis is Good.     Pt will continue to benefit from skilled outpatient physical therapy to address the deficits listed in the problem list box on initial evaluation, provide pt/family education and to maximize pt's level of independence in the home and community environment.     Pt's spiritual, cultural and educational needs considered and pt agreeable to plan of care and goals.     Anticipated barriers to physical therapy: none    Goals:    Patient will be independent with home exercise program to facilitate carryover between visits.  2.  Patient will  increase bilateral hip strength to 5/5 to improve stability with gait and activiites of daily living.  3.  Patient will be able to go up/down stairs reciprocally with 1 rail as needed to be able to get in and out of his camper safely.  4.  Patient will be able to stand/walk > 30 minutes with </= 2/10 pain in right hip/buttock for improved ability to perform activiites of daily living.  5.  Patient will improve right hip range of motion by 10 degrees each way for improved mobility.    Plan     Plan of care Certification: 1/31/2024 to 3/29/2024.     Outpatient Physical Therapy 2 times weekly for 8 weeks to include the following interventions: Manual Therapy, Moist Heat/ Ice, Neuromuscular Re-ed, Patient Education, Therapeutic Activities, and Therapeutic Exercise.     Zacarias Newman, PTA   02/12/2024

## 2024-02-14 ENCOUNTER — CLINICAL SUPPORT (OUTPATIENT)
Dept: REHABILITATION | Facility: HOSPITAL | Age: 59
End: 2024-02-14
Payer: MEDICARE

## 2024-02-14 DIAGNOSIS — M25.651 STIFFNESS OF RIGHT HIP JOINT: ICD-10-CM

## 2024-02-14 DIAGNOSIS — G57.01 PIRIFORMIS SYNDROME OF RIGHT SIDE: Primary | ICD-10-CM

## 2024-02-14 DIAGNOSIS — R26.9 GAIT DISTURBANCE: ICD-10-CM

## 2024-02-14 PROCEDURE — 97530 THERAPEUTIC ACTIVITIES: CPT

## 2024-02-14 PROCEDURE — 97110 THERAPEUTIC EXERCISES: CPT

## 2024-02-14 PROCEDURE — 97112 NEUROMUSCULAR REEDUCATION: CPT

## 2024-02-14 NOTE — PROGRESS NOTES
[facility]   Physical Therapy Treatment Note      Name: Hola Payton [facility] Number: [id number]    Therapy Diagnosis:   Encounter Diagnoses   Name Primary?    Piriformis syndrome of right side Yes    Stiffness of right hip joint     Gait disturbance      Physician: John Cochran MD    Physician Orders: PT Eval and Treat   Medical Diagnosis from Referral: see above  Evaluation Date: 1/31/2024  Authorization Period Expiration: n/a  Plan of Care Expiration: 3/29/2024     Date of Surgery: n/a  Visit # / Visits authorized: 4/ no limits   FOTO: 1/3 = 43    Visit Date: 2/14/2024      PTA Visit #: 0/5     Time In: 7:32 am  Time Out: 8:17 am  Total Billable Time:45 individual minutes    Subjective     Pt reports: No pain in R hip on arrival.  He was compliant with home exercise program.  Response to previous treatment: less pain  Functional change: none identified    Pain: 0/10  Location: right hip      Objective      Objective Measures updated at progress report unless specified.     Treatment     Hola received the treatments listed below:      therapeutic exercises to develop strength, endurance, ROM, flexibility, posture, and core stabilization for 22 minutes including:  Slant board stretch 3 x 30 sec  Piriformis stretch--3 x 30 sec  Figure 4 stretch--3 x 30 sec   IT band with strap--3 x 30 sec  Seated hamstring curls 5 plates x 30  Hamstring stretch @ stairs 3 x 30 sec  (All stretches performed on both legs)    manual therapy techniques: Joint mobilizations, Manual traction, Myofacial release, Manual Lymphatic Drainage, Soft tissue Mobilization, and Friction Massage were applied to the: R hip for 0 minutes, including:  -    neuromuscular re-education activities to improve: Balance, Coordination, Kinesthetic, Sense, Proprioception, and Posture for 12 minutes. The following activities were included:  Bridges with abduction--blue band--3 x 10  SL Hip Abd--Green band--3 x 10 each  leg  Hooklying Clam Shells--blue band--3 x 10 each leg    therapeutic activities to improve functional performance for 11 minutes, including:  Bike x 5 min to improve endurance  Cybex hip abduction 2 plates x 20 each leg     Patient Education and Home Exercises       Education provided:   - review of home exercise program and current Plan of Care/rational of treatment.    Written Home Exercises Provided: Patient instructed to cont prior HEP. Exercises were reviewed and Hola was able to demonstrate them prior to the end of the session.  Hola demonstrated good understanding of the education provided. See EMR under Patient Instructions for exercises provided during therapy sessions    Assessment     At Evaluation:  Hola is a 59 y.o. male referred to outpatient Physical Therapy with a medical diagnosis of piriformis syndrome. Patient presents with complaints of right hip/buttock pain after falling off a ladder about 3 weeks ago. He is stiff throughout the right hip with slight decrease in strength. He has increased pain with standing and walking and is having difficulty going up and down the stairs to his camper. He would benefit from skilled therapy to improve strength, functional mobility and decrease pain.       Current Assessment:  Patient arrives without pain in R hip again this morning.  Reports that it is steadily getting better. Added Cybex hip abduction and seated hamstring curls today. He found the abduction challenging. Continue to focus on lateral and posterior hip stretching and strengthening. Fatigued at treatment end but no pain. Will cont POC and progress as able.      Hola Is progressing towards his goals.   Pt prognosis is Good.     Pt will continue to benefit from skilled outpatient physical therapy to address the deficits listed in the problem list box on initial evaluation, provide pt/family education and to maximize pt's level of independence in the home and community environment.      Pt's spiritual, cultural and educational needs considered and pt agreeable to plan of care and goals.     Anticipated barriers to physical therapy: none    Goals:    Patient will be independent with home exercise program to facilitate carryover between visits.  2.  Patient will increase bilateral hip strength to 5/5 to improve stability with gait and activiites of daily living.  3.  Patient will be able to go up/down stairs reciprocally with 1 rail as needed to be able to get in and out of his camper safely.  4.  Patient will be able to stand/walk > 30 minutes with </= 2/10 pain in right hip/buttock for improved ability to perform activiites of daily living.  5.  Patient will improve right hip range of motion by 10 degrees each way for improved mobility.    Plan     Plan of care Certification: 1/31/2024 to 3/29/2024.     Outpatient Physical Therapy 2 times weekly for 8 weeks to include the following interventions: Manual Therapy, Moist Heat/ Ice, Neuromuscular Re-ed, Patient Education, Therapeutic Activities, and Therapeutic Exercise.     ÁNGEL HO, PT   02/14/2024

## 2024-02-19 ENCOUNTER — CLINICAL SUPPORT (OUTPATIENT)
Dept: REHABILITATION | Facility: HOSPITAL | Age: 59
End: 2024-02-19
Payer: MEDICARE

## 2024-02-19 DIAGNOSIS — R26.9 GAIT DISTURBANCE: ICD-10-CM

## 2024-02-19 DIAGNOSIS — G57.01 PIRIFORMIS SYNDROME OF RIGHT SIDE: Primary | ICD-10-CM

## 2024-02-19 DIAGNOSIS — M25.651 STIFFNESS OF RIGHT HIP JOINT: ICD-10-CM

## 2024-02-19 PROCEDURE — 97150 GROUP THERAPEUTIC PROCEDURES: CPT | Mod: CQ

## 2024-02-19 PROCEDURE — 97530 THERAPEUTIC ACTIVITIES: CPT | Mod: CQ

## 2024-02-19 NOTE — PROGRESS NOTES
[facility]   Physical Therapy Treatment and DC Note     Name: Hola Payton [facility] Number: [id number]    Therapy Diagnosis:   Encounter Diagnoses   Name Primary?    Piriformis syndrome of right side Yes    Stiffness of right hip joint     Gait disturbance      Physician: John Cochran MD    Physician Orders: PT Eval and Treat   Medical Diagnosis from Referral: see above  Evaluation Date: 1/31/2024  Authorization Period Expiration: n/a  Plan of Care Expiration: 3/29/2024     Date of Surgery: n/a  Visit # / Visits authorized: 5/no limits   FOTO: 1/3 = 43   73 @ DC    Visit Date: 2/19/2024      PTA Visit #: 1/5     Time In: 7:45 am  Time Out:  8:12 am  Total Billable Time: 9 individual minutes (10 min group and 8 min non billed)    Subjective     Pt reports: No pain in R hip on arrival.  He was compliant with home exercise program.  Response to previous treatment: less pain  Functional change: none identified    Pain: 0/10  Location: right hip      Objective      Range of motion:  Motion Right Left    Hip flexion  WITHIN FUNCTIONAL LIMITS  WITHIN FUNCTIONAL LIMITS   Hip extension  WITHIN FUNCTIONAL LIMITS  WITHIN FUNCTIONAL LIMITS   Hip abduction  WITHIN FUNCTIONAL LIMITS  WITHIN FUNCTIONAL LIMITS   Hip adduction  WITHIN FUNCTIONAL LIMITS  WITHIN FUNCTIONAL LIMITS   Internal rotation  WITHIN FUNCTIONAL LIMITS  WITHIN FUNCTIONAL LIMITS   External rotation  WITHIN FUNCTIONAL LIMITS  WITHIN FUNCTIONAL LIMITS   Knee extension  WITHIN FUNCTIONAL LIMITS  WITHIN FUNCTIONAL LIMITS   Knee flexion  WITHIN FUNCTIONAL LIMITS  WITHIN FUNCTIONAL LIMITS         Manual muscle test   Muscle Right  Left    Hip flexion  MMT strength: 5/5  MMT strength: 5/5   Hip extension  MMT strength: 5/5  MMT strength: 5/5   Hip abduction  MMT strength: 5/5  MMT strength: 5/5   Hip adduction  MMT strength: 5/5  MMT strength: 5/5   Hip internal rotation  NT  NT   Hip external rotation  NT  NT   Knee extension   MMT strength: 5/5  MMT strength: 5/5   Knee flexion  MMT strength: 5/5  MMT strength: 5/5         Functional tasks:    Weight bearing precautions: FWB  Assistive device: none  Ambulation distance and deviations: No deviation  Stairs: No difficulty with reciprocal pattern    Treatment     Hola received the treatments listed below:      therapeutic exercises to develop strength, endurance, ROM, flexibility, posture, and core stabilization for 0 individual minutes including:  Slant board stretch 3 x 30 sec  Piriformis stretch--3 x 30 sec  Figure 4 stretch--3 x 30 sec -Charged as Group   IT band with strap--3 x 30 sec  Seated hamstring curls 5 plates x 30 -Charged as Group   Hamstring stretch @ stairs 3 x 30 sec -Charged as Group   (All stretches performed on both legs)    manual therapy techniques: Joint mobilizations, Manual traction, Myofacial release, Manual Lymphatic Drainage, Soft tissue Mobilization, and Friction Massage were applied to the: R hip for 0 minutes, including:  -    neuromuscular re-education activities to improve: Balance, Coordination, Kinesthetic, Sense, Proprioception, and Posture for 0 individual minutes. The following activities were included:  Bridges with abduction--blue band--3 x 10  SL Hip Abd--Green band--3 x 10 each leg  Hooklying Clam Shells--blue band--3 x 10 each leg    therapeutic activities to improve functional performance for 9 minutes, including:  Bike x 5 min to improve endurance  Cybex hip abduction 2 plates x 20 each leg     Patient Education and Home Exercises       Education provided:   - review of home exercise program and current Plan of Care/rational of treatment.    Written Home Exercises Provided: Patient instructed to cont prior HEP. Exercises were reviewed and Hola was able to demonstrate them prior to the end of the session.  Hola demonstrated good understanding of the education provided. See EMR under Patient Instructions for exercises provided during  therapy sessions    Assessment     At Evaluation:  Hola is a 59 y.o. male referred to outpatient Physical Therapy with a medical diagnosis of piriformis syndrome. Patient presents with complaints of right hip/buttock pain after falling off a ladder about 3 weeks ago. He is stiff throughout the right hip with slight decrease in strength. He has increased pain with standing and walking and is having difficulty going up and down the stairs to his camper. He would benefit from skilled therapy to improve strength, functional mobility and decrease pain.       Current Assessment:  Patient arrives without pain in R hip again this morning.  Reports that he has not had any pain in a few days.  Patient has met all goals and will be DC from PT today.        Hola Is progressing towards his goals.   Pt prognosis is Good.     Pt's spiritual, cultural and educational needs considered and pt agreeable to plan of care and goals.     Anticipated barriers to physical therapy: none    Goals:    Patient will be independent with home exercise program to facilitate carryover between visits.--Reports compliance--MET  2.  Patient will increase bilateral hip strength to 5/5 to improve stability with gait and activiites of daily living.--MET  3.  Patient will be able to go up/down stairs reciprocally with 1 rail as needed to be able to get in and out of his camper safely.--MET  4.  Patient will be able to stand/walk > 30 minutes with </= 2/10 pain in right hip/buttock for improved ability to perform activiites of daily living.--MET  5.  Patient will improve right hip range of motion by 10 degrees each way for improved mobility.--MET    Plan     Patient has met all goals and will be DC from PT    Zacarias Newman, SANDRA   02/19/2024

## 2024-03-11 ENCOUNTER — OFFICE VISIT (OUTPATIENT)
Dept: FAMILY MEDICINE | Facility: CLINIC | Age: 59
End: 2024-03-11
Payer: MEDICARE

## 2024-03-11 VITALS
OXYGEN SATURATION: 98 % | HEIGHT: 76 IN | DIASTOLIC BLOOD PRESSURE: 89 MMHG | BODY MASS INDEX: 30.93 KG/M2 | RESPIRATION RATE: 18 BRPM | TEMPERATURE: 98 F | SYSTOLIC BLOOD PRESSURE: 135 MMHG | HEART RATE: 82 BPM | WEIGHT: 254 LBS

## 2024-03-11 DIAGNOSIS — E78.5 DYSLIPIDEMIA: Primary | ICD-10-CM

## 2024-03-11 PROCEDURE — 3079F DIAST BP 80-89 MM HG: CPT | Mod: ,,, | Performed by: INTERNAL MEDICINE

## 2024-03-11 PROCEDURE — 99212 OFFICE O/P EST SF 10 MIN: CPT | Mod: ,,, | Performed by: INTERNAL MEDICINE

## 2024-03-11 PROCEDURE — 3075F SYST BP GE 130 - 139MM HG: CPT | Mod: ,,, | Performed by: INTERNAL MEDICINE

## 2024-03-11 PROCEDURE — 3008F BODY MASS INDEX DOCD: CPT | Mod: ,,, | Performed by: INTERNAL MEDICINE

## 2024-03-11 PROCEDURE — 1159F MED LIST DOCD IN RCRD: CPT | Mod: ,,, | Performed by: INTERNAL MEDICINE

## 2024-03-11 NOTE — PROGRESS NOTES
"Subjective:       Patient ID: Hola Burrell is a 59 y.o. male.    Chief Complaint: Follow-up (4 month follow up )    Follow-up      .  Patient presents today stating that he has not sure why she is here at the visit then he stated that he is here for regular follow-up no major issues cholesterol has been stable I reviewed his x-rays no broken bones on his hips basically the patient is doing quite well will see him back again in 6 months    Current Medications:    Current Outpatient Medications:     cariprazine (VRAYLAR) 3 mg Cap, Take 3 mg by mouth once daily., Disp: , Rfl:     carvediloL (COREG) 6.25 MG tablet, Take 1 tablet (6.25 mg total) by mouth 2 (two) times daily with meals., Disp: 60 tablet, Rfl: 11    celecoxib (CELEBREX) 200 MG capsule, Take 200 mg by mouth daily as needed., Disp: , Rfl:     fluticasone propionate (FLONASE) 50 mcg/actuation nasal spray, 1 spray (50 mcg total) by Each Nostril route once daily., Disp: 16 g, Rfl: 2    naproxen (NAPROSYN) 500 MG tablet, Take 1 tablet (500 mg total) by mouth 2 (two) times daily as needed (pain)., Disp: 20 tablet, Rfl: 0    rosuvastatin (CRESTOR) 40 MG Tab, Take 1 tablet (40 mg total) by mouth every evening., Disp: 90 tablet, Rfl: 3    sildenafiL (VIAGRA) 100 MG tablet, Take 1 tablet (100 mg total) by mouth daily as needed for Erectile Dysfunction., Disp: 10 tablet, Rfl: 3    tamsulosin (FLOMAX) 0.4 mg Cap, Take 1 capsule (0.4 mg total) by mouth 2 (two) times a day., Disp: 180 capsule, Rfl: 1    zaleplon (SONATA) 10 MG capsule, Take 10 mg by mouth nightly as needed., Disp: , Rfl:            Review of Systems             Vitals:    03/11/24 0932   BP: 135/89   BP Location: Left arm   Patient Position: Sitting   BP Method: Large (Automatic)   Pulse: 82   Resp: 18   Temp: 97.7 °F (36.5 °C)   TempSrc: Temporal   SpO2: 98%   Weight: 115.2 kg (254 lb)   Height: 6' 4" (1.93 m)        Physical Exam  Vitals and nursing note reviewed.   Constitutional:       " Appearance: Normal appearance.   Cardiovascular:      Rate and Rhythm: Normal rate and regular rhythm.      Pulses: Normal pulses.      Heart sounds: Normal heart sounds.   Pulmonary:      Effort: Pulmonary effort is normal.      Breath sounds: Normal breath sounds.   Abdominal:      General: Abdomen is flat. Bowel sounds are normal.      Palpations: Abdomen is soft.   Musculoskeletal:         General: Normal range of motion.   Skin:     General: Skin is warm and dry.   Neurological:      General: No focal deficit present.      Mental Status: He is alert and oriented to person, place, and time. Mental status is at baseline.           Last Labs:     No visits with results within 1 Month(s) from this visit.   Latest known visit with results is:   Office Visit on 10/17/2023   Component Date Value    Triglycerides 10/17/2023 89     Cholesterol 10/17/2023 84     HDL Cholesterol 10/17/2023 45     Cholesterol/HDL Ratio (R* 10/17/2023 1.9     Non-HDL 10/17/2023 39     LDL Calculated 10/17/2023 21     VLDL 10/17/2023 18     PSA Total 10/17/2023 4.250 (H)        Last Imaging:  X-Ray Hip 3 or 4 views Bilateral  See Procedure Notes for results.     IMPRESSION: Please see Ortho procedure notes for report.      This procedure was auto-finalized by: Virtual Radiologist         **Labs and x-rays personally reviewed by me    ** reviewed      Objective:        Assessment:       1. Dyslipidemia             Plan:         1. Dyslipidemia

## 2024-08-07 RX ORDER — TAMSULOSIN HYDROCHLORIDE 0.4 MG/1
2 CAPSULE ORAL
Qty: 90 CAPSULE | Refills: 0 | Status: SHIPPED | OUTPATIENT
Start: 2024-08-07

## 2024-08-09 DIAGNOSIS — Z71.89 COMPLEX CARE COORDINATION: ICD-10-CM

## 2024-09-04 ENCOUNTER — HOSPITAL ENCOUNTER (EMERGENCY)
Facility: HOSPITAL | Age: 59
Discharge: HOME | End: 2024-09-04
Payer: MEDICARE

## 2024-09-04 VITALS
DIASTOLIC BLOOD PRESSURE: 92 MMHG | TEMPERATURE: 98 F | SYSTOLIC BLOOD PRESSURE: 154 MMHG | OXYGEN SATURATION: 97 % | WEIGHT: 259 LBS | BODY MASS INDEX: 31.54 KG/M2 | HEIGHT: 76 IN | HEART RATE: 94 BPM | RESPIRATION RATE: 18 BRPM

## 2024-09-04 DIAGNOSIS — M25.519 SHOULDER PAIN: ICD-10-CM

## 2024-09-04 PROCEDURE — 99283 EMERGENCY DEPT VISIT LOW MDM: CPT | Mod: 25

## 2024-09-04 NOTE — ED PROVIDER NOTES
Encounter Date: 9/4/2024       History     Chief Complaint   Patient presents with    Shoulder Pain     Right shoulder pain.  No injury      59-year-old male presents to the emergency department to be evaluated for right shoulder pain.  He said that he began having right shoulder pain 3 days ago.  He said he might have done something to it in his sleep.  Denies any injury.  He said that he tore his rotator cuff when he was 17 years old playing football and has had shoulder pain since then.    The history is provided by the patient.   Shoulder Pain  This is a recurrent problem. Pertinent negatives include no chest pain, no abdominal pain, no headaches and no shortness of breath.     Review of patient's allergies indicates:   Allergen Reactions    Desyrel [trazodone]      Past Medical History:   Diagnosis Date    Alcohol dependence with uncomplicated intoxication     Asthma     Bilateral primary osteoarthritis of hip     Bipolar disorder     Drug use     Herpes simplex     hyperlipidemia     Hypertension     Syncope and collapse      History reviewed. No pertinent surgical history.  Family History   Problem Relation Name Age of Onset    Cancer Mother      Cancer Father      Cancer Sister      Mental illness Sister      Cancer Brother      Diabetes Maternal Aunt      Diabetes Maternal Uncle      Hypertension Maternal Grandfather       Social History     Tobacco Use    Smoking status: Every Day     Current packs/day: 1.50     Average packs/day: 1.5 packs/day for 40.0 years (60.0 ttl pk-yrs)     Types: Cigarettes     Passive exposure: Past    Smokeless tobacco: Current    Tobacco comments:     vapoing   Substance Use Topics    Alcohol use: Not Currently    Drug use: Yes     Types: Marijuana     Review of Systems   Constitutional:  Negative for appetite change and chills.   Respiratory:  Negative for shortness of breath.    Cardiovascular:  Negative for chest pain.   Gastrointestinal:  Negative for abdominal pain.    Neurological:  Negative for headaches.   All other systems reviewed and are negative.      Physical Exam     Initial Vitals [09/04/24 1235]   BP Pulse Resp Temp SpO2   (!) 154/92 94 18 98 °F (36.7 °C) 97 %      MAP       --         Physical Exam    Vitals reviewed.  Constitutional: He appears well-developed and well-nourished.   Neck: Neck supple.   Cardiovascular:  Normal rate and regular rhythm.           Pulmonary/Chest: Breath sounds normal.   Musculoskeletal:      Right shoulder: No swelling, deformity, effusion, laceration, tenderness, bony tenderness or crepitus. Decreased range of motion. Normal strength. Normal pulse.      Cervical back: Neck supple.     Neurological: He is alert and oriented to person, place, and time. He has normal strength. GCS score is 15. GCS eye subscore is 4. GCS verbal subscore is 5. GCS motor subscore is 6.   Skin: Skin is warm and dry. Capillary refill takes less than 2 seconds.   Psychiatric: He has a normal mood and affect.         Medical Screening Exam   See Full Note    ED Course   Procedures  Labs Reviewed - No data to display       Imaging Results              X-Ray Shoulder Complete 2 View Right (Final result)  Result time 09/04/24 13:21:27      Final result by Steven Williamson MD (09/04/24 13:21:27)                   Impression:      No acute displaced fracture.      Electronically signed by: Steven Williamson MD  Date:    09/04/2024  Time:    13:21               Narrative:    EXAMINATION:  XR SHOULDER COMPLETE 2 OR MORE VIEWS RIGHT    CLINICAL HISTORY:  Pain in unspecified shoulder.    TECHNIQUE:  Two or three views of the right shoulder were performed.    COMPARISON:  None.    FINDINGS:  No acute fracture or dislocation.  Mild degenerative changes in the right shoulder.  Few punctate metallic densities or calcifications in the chest wall and right shoulder region, likely remote as similar findings were seen on prior CT chest in 2023.  Soft tissues are otherwise  unremarkable.                                       Medications - No data to display  Medical Decision Making  59-year-old male presents to the emergency department to be evaluated for right shoulder pain.  He said that he began having right shoulder pain 3 days ago.  He said he might have done something to it in his sleep.  Denies any injury.  He said that he tore his rotator cuff when he was 17 years old playing football and has had shoulder pain since then.  Diagnosis: Shoulder pain  X-ray ordered, reviewed as well as radiologist's interpretation significant for no acute process    Amount and/or Complexity of Data Reviewed  Radiology: ordered.                                      Clinical Impression:   Final diagnoses:  [M25.519] Shoulder pain        ED Disposition Condition    Discharge Stable          ED Prescriptions    None       Follow-up Information    None          Rupali Eisenberg, JERSON  09/04/24 1318

## 2024-09-11 ENCOUNTER — OFFICE VISIT (OUTPATIENT)
Dept: FAMILY MEDICINE | Facility: CLINIC | Age: 59
End: 2024-09-11
Payer: MEDICARE

## 2024-09-11 ENCOUNTER — APPOINTMENT (OUTPATIENT)
Dept: RADIOLOGY | Facility: CLINIC | Age: 59
End: 2024-09-11
Attending: INTERNAL MEDICINE
Payer: MEDICARE

## 2024-09-11 VITALS
RESPIRATION RATE: 18 BRPM | HEIGHT: 76 IN | WEIGHT: 255 LBS | SYSTOLIC BLOOD PRESSURE: 143 MMHG | HEART RATE: 80 BPM | BODY MASS INDEX: 31.05 KG/M2 | TEMPERATURE: 97 F | OXYGEN SATURATION: 97 % | DIASTOLIC BLOOD PRESSURE: 97 MMHG

## 2024-09-11 DIAGNOSIS — G89.29 OTHER CHRONIC PAIN: ICD-10-CM

## 2024-09-11 DIAGNOSIS — M19.011 OSTEOARTHRITIS OF RIGHT SHOULDER, UNSPECIFIED OSTEOARTHRITIS TYPE: Primary | ICD-10-CM

## 2024-09-11 DIAGNOSIS — M19.011 OSTEOARTHRITIS OF RIGHT SHOULDER, UNSPECIFIED OSTEOARTHRITIS TYPE: ICD-10-CM

## 2024-09-11 DIAGNOSIS — I10 PRIMARY HYPERTENSION: ICD-10-CM

## 2024-09-11 PROCEDURE — 96372 THER/PROPH/DIAG INJ SC/IM: CPT | Mod: ,,, | Performed by: INTERNAL MEDICINE

## 2024-09-11 PROCEDURE — 73030 X-RAY EXAM OF SHOULDER: CPT | Mod: TC,RHCUB,RT | Performed by: INTERNAL MEDICINE

## 2024-09-11 PROCEDURE — 3008F BODY MASS INDEX DOCD: CPT | Mod: ,,, | Performed by: INTERNAL MEDICINE

## 2024-09-11 PROCEDURE — 99214 OFFICE O/P EST MOD 30 MIN: CPT | Mod: 25,,, | Performed by: INTERNAL MEDICINE

## 2024-09-11 PROCEDURE — 73030 X-RAY EXAM OF SHOULDER: CPT | Mod: 26,RT,, | Performed by: RADIOLOGY

## 2024-09-11 PROCEDURE — 1159F MED LIST DOCD IN RCRD: CPT | Mod: ,,, | Performed by: INTERNAL MEDICINE

## 2024-09-11 PROCEDURE — 3080F DIAST BP >= 90 MM HG: CPT | Mod: ,,, | Performed by: INTERNAL MEDICINE

## 2024-09-11 PROCEDURE — 3077F SYST BP >= 140 MM HG: CPT | Mod: ,,, | Performed by: INTERNAL MEDICINE

## 2024-09-11 RX ORDER — SILDENAFIL 100 MG/1
100 TABLET, FILM COATED ORAL DAILY PRN
Qty: 10 TABLET | Refills: 3 | Status: SHIPPED | OUTPATIENT
Start: 2024-09-11

## 2024-09-11 RX ORDER — HYDROCODONE BITARTRATE AND ACETAMINOPHEN 7.5; 325 MG/1; MG/1
1 TABLET ORAL 2 TIMES DAILY PRN
Qty: 8 TABLET | Refills: 0 | Status: SHIPPED | OUTPATIENT
Start: 2024-09-11

## 2024-09-11 RX ORDER — CARIPRAZINE 3 MG/1
3 CAPSULE, GELATIN COATED ORAL DAILY
Qty: 30 CAPSULE | Refills: 2 | Status: SHIPPED | OUTPATIENT
Start: 2024-09-11

## 2024-09-11 RX ORDER — ROSUVASTATIN CALCIUM 40 MG/1
40 TABLET, COATED ORAL NIGHTLY
Qty: 90 TABLET | Refills: 3 | Status: SHIPPED | OUTPATIENT
Start: 2024-09-11 | End: 2025-09-11

## 2024-09-11 RX ORDER — KETOROLAC TROMETHAMINE 30 MG/ML
30 INJECTION, SOLUTION INTRAMUSCULAR; INTRAVENOUS
Status: COMPLETED | OUTPATIENT
Start: 2024-09-11 | End: 2024-09-11

## 2024-09-11 RX ORDER — CELECOXIB 200 MG/1
200 CAPSULE ORAL DAILY PRN
Qty: 30 CAPSULE | Refills: 1 | Status: CANCELLED | OUTPATIENT
Start: 2024-09-11

## 2024-09-11 RX ORDER — CARVEDILOL 6.25 MG/1
6.25 TABLET ORAL 2 TIMES DAILY WITH MEALS
Qty: 60 TABLET | Refills: 1 | Status: SHIPPED | OUTPATIENT
Start: 2024-09-11

## 2024-09-11 RX ORDER — NAPROXEN 500 MG/1
500 TABLET ORAL 2 TIMES DAILY PRN
Qty: 30 TABLET | Refills: 1 | Status: SHIPPED | OUTPATIENT
Start: 2024-09-11 | End: 2024-09-18

## 2024-09-11 RX ADMIN — KETOROLAC TROMETHAMINE 30 MG: 30 INJECTION, SOLUTION INTRAMUSCULAR; INTRAVENOUS at 09:09

## 2024-09-16 RX ORDER — TAMSULOSIN HYDROCHLORIDE 0.4 MG/1
2 CAPSULE ORAL
Qty: 90 CAPSULE | Refills: 0 | Status: SHIPPED | OUTPATIENT
Start: 2024-09-16
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
## 2024-09-18 ENCOUNTER — OFFICE VISIT (OUTPATIENT)
Dept: ORTHOPEDICS | Facility: CLINIC | Age: 59
End: 2024-09-18
Payer: MEDICARE

## 2024-09-18 DIAGNOSIS — M25.511 CHRONIC RIGHT SHOULDER PAIN: Primary | ICD-10-CM

## 2024-09-18 DIAGNOSIS — G89.29 CHRONIC RIGHT SHOULDER PAIN: Primary | ICD-10-CM

## 2024-09-18 DIAGNOSIS — M19.011 OSTEOARTHRITIS OF RIGHT SHOULDER, UNSPECIFIED OSTEOARTHRITIS TYPE: ICD-10-CM

## 2024-09-18 PROCEDURE — 99999 PR PBB SHADOW E&M-EST. PATIENT-LVL III: CPT | Mod: PBBFAC,,, | Performed by: NURSE PRACTITIONER

## 2024-09-18 PROCEDURE — 99213 OFFICE O/P EST LOW 20 MIN: CPT | Mod: PBBFAC | Performed by: NURSE PRACTITIONER

## 2024-09-18 PROCEDURE — 99203 OFFICE O/P NEW LOW 30 MIN: CPT | Mod: S$PBB,,, | Performed by: NURSE PRACTITIONER

## 2024-09-18 PROCEDURE — 1159F MED LIST DOCD IN RCRD: CPT | Mod: CPTII,,, | Performed by: NURSE PRACTITIONER

## 2024-09-18 RX ORDER — NAPROXEN 500 MG/1
500 TABLET ORAL 2 TIMES DAILY WITH MEALS
Qty: 60 TABLET | Refills: 0 | Status: SHIPPED | OUTPATIENT
Start: 2024-09-18
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
## 2024-10-22 ENCOUNTER — OFFICE VISIT (OUTPATIENT)
Dept: CARDIOLOGY | Facility: CLINIC | Age: 59
End: 2024-10-22
Payer: MEDICARE

## 2024-10-22 VITALS
SYSTOLIC BLOOD PRESSURE: 115 MMHG | RESPIRATION RATE: 16 BRPM | HEIGHT: 74 IN | BODY MASS INDEX: 33.75 KG/M2 | HEART RATE: 89 BPM | DIASTOLIC BLOOD PRESSURE: 70 MMHG | WEIGHT: 263 LBS

## 2024-10-22 DIAGNOSIS — I10 PRIMARY HYPERTENSION: Primary | ICD-10-CM

## 2024-10-22 PROCEDURE — 3074F SYST BP LT 130 MM HG: CPT | Mod: CPTII,,, | Performed by: STUDENT IN AN ORGANIZED HEALTH CARE EDUCATION/TRAINING PROGRAM

## 2024-10-22 PROCEDURE — 3078F DIAST BP <80 MM HG: CPT | Mod: CPTII,,, | Performed by: STUDENT IN AN ORGANIZED HEALTH CARE EDUCATION/TRAINING PROGRAM

## 2024-10-22 PROCEDURE — 99214 OFFICE O/P EST MOD 30 MIN: CPT | Mod: PBBFAC | Performed by: STUDENT IN AN ORGANIZED HEALTH CARE EDUCATION/TRAINING PROGRAM

## 2024-10-22 PROCEDURE — 99999 PR PBB SHADOW E&M-EST. PATIENT-LVL IV: CPT | Mod: PBBFAC,,, | Performed by: STUDENT IN AN ORGANIZED HEALTH CARE EDUCATION/TRAINING PROGRAM

## 2024-10-22 PROCEDURE — 99213 OFFICE O/P EST LOW 20 MIN: CPT | Mod: S$PBB,,, | Performed by: STUDENT IN AN ORGANIZED HEALTH CARE EDUCATION/TRAINING PROGRAM

## 2024-10-22 PROCEDURE — 1159F MED LIST DOCD IN RCRD: CPT | Mod: CPTII,,, | Performed by: STUDENT IN AN ORGANIZED HEALTH CARE EDUCATION/TRAINING PROGRAM

## 2024-10-22 PROCEDURE — 3008F BODY MASS INDEX DOCD: CPT | Mod: CPTII,,, | Performed by: STUDENT IN AN ORGANIZED HEALTH CARE EDUCATION/TRAINING PROGRAM

## 2024-10-22 NOTE — ASSESSMENT & PLAN NOTE
Controlled  Did not bring meds  Taking coreg - improve palpitations   Reason for Call:  Form, our goal is to have forms completed with 72 hours, however, some forms may require a visit or additional information.    Type of letter, form or note:  medical    Who is the form from?: [facility] (if other please explain)    Where did the form come from: form was faxed in    What clinic location was the form placed at?: [facility], [place] - [phone]    Where the form was placed: 's Box    What number is listed as a contact on the form?: [phone]       Additional comments: sign fax back    Call taken on 7/11/2018 at 9:09 AM by Adry Mireles

## 2024-10-22 NOTE — PROGRESS NOTES
PCP: Kali Yañez MD    Referring Provider:     Subjective:   Hola Burrell is a 59 y.o. male with hx of HTN, HLD, bipolar disorder, amphetamine use who presents for follow up.    10/22/24 - Doing well. Denies any complaints.     9/25/23 - Doing well. Reports occasional orthostatic symptoms; taking tamsulosin for BPH. Did not bring meds, unclear if taking coreg however his BP in clinic is well controlled. Able to keep up with his activities. Denies any cardiac symptoms.     9/22/22 - Doing well. BP is better controlled. Reports getting started on losartan but had dizzy episodes that lead him to discontinue it. Currently well controlled coreg    5/2022 -Patient had syncopal spell that he attributed to low blood sugar but does not have diabetes.  Was in a bar at the time.  He states he has had heart flutters in the past, lasting a few seconds.   He denies any chest pain.  He reports shortness of breath with exertion.  Denies lower extremity edema.  Blood pressure elevated.      Fhx: Negative for heart disease.   Shx: Smoker(Vapes), occasional etoh, states he is around drug use.     EKG 5/24/22 -  Sinus rhythm with premature atrial complexes.            4/21/22 -  sinus rhythm, QS wave in lead V1, poor r wave progression (V2), suspect anteroseptal myocardial infarction?, inverted T wave (V1,V2)  ECHO         Lab Results   Component Value Date     04/06/2022    K 3.8 04/06/2022     (H) 04/06/2022    CO2 29 04/06/2022    BUN 7 04/06/2022    CREATININE 0.99 04/06/2022    CALCIUM 8.9 04/06/2022    ANIONGAP 5 (L) 04/06/2022    ESTGFRAFRICA 94 08/17/2021    EGFRNONAA 83 04/06/2022       Lab Results   Component Value Date    CHOL 84 10/17/2023    CHOL 90 08/30/2022    CHOL 145 08/17/2021     Lab Results   Component Value Date    HDL 45 10/17/2023    HDL 43 08/30/2022    HDL 33 (L) 08/17/2021     Lab Results   Component Value Date    LDLCALC 21 10/17/2023    LDLCALC 28 08/30/2022    LDLCALC 71 08/17/2021  · Patient appears compensated at this time  Did get IV Lasix while in the ICU    · Continue beta-blocker "    Lab Results   Component Value Date    TRIG 89 10/17/2023    TRIG 96 08/30/2022    TRIG 206 (H) 08/17/2021     Lab Results   Component Value Date    CHOLHDL 1.9 10/17/2023    CHOLHDL 2.1 08/30/2022    CHOLHDL 4.4 08/17/2021       Lab Results   Component Value Date    WBC 10.36 08/17/2021    HGB 13.8 08/17/2021    HCT 42.5 08/17/2021    MCV 93.4 08/17/2021     08/17/2021           Review of Systems   Respiratory:  Negative for cough and shortness of breath.    Cardiovascular:  Negative for chest pain, palpitations, orthopnea, claudication, leg swelling and PND.   Neurological:  Negative for loss of consciousness.         Objective:   /70   Pulse 89   Resp 16   Ht 6' 2" (1.88 m)   Wt 119.3 kg (263 lb)   BMI 33.77 kg/m²     Physical Exam  Cardiovascular:      Rate and Rhythm: Normal rate.      Pulses: Normal pulses.      Heart sounds: Normal heart sounds. No murmur heard.  Pulmonary:      Effort: Pulmonary effort is normal.      Breath sounds: Normal breath sounds.   Musculoskeletal:         General: No swelling.      Cervical back: Neck supple.   Skin:     Findings: No rash.   Neurological:      Mental Status: He is alert and oriented to person, place, and time.           Assessment:     1. Primary hypertension                  Plan:   Hypertension  Controlled  Did not bring meds  Taking coreg - improve palpitations        Syncope and collapse  Single episode; at a bar  Orma dizzy and passed out  Improved after eating something       Mixed hyperlipidemia  On crestor 40mg qd  ASCVD risk 14.9%     Smoker  Smoking counseling performed        "

## 2024-10-28 RX ORDER — TAMSULOSIN HYDROCHLORIDE 0.4 MG/1
2 CAPSULE ORAL
Qty: 90 CAPSULE | Refills: 0 | Status: SHIPPED | OUTPATIENT
Start: 2024-10-28

## 2024-12-10 DIAGNOSIS — I10 PRIMARY HYPERTENSION: ICD-10-CM

## 2024-12-10 RX ORDER — CARVEDILOL 6.25 MG/1
6.25 TABLET ORAL 2 TIMES DAILY WITH MEALS
Qty: 60 TABLET | Refills: 0 | Status: SHIPPED | OUTPATIENT
Start: 2024-12-10

## 2024-12-11 ENCOUNTER — OFFICE VISIT (OUTPATIENT)
Dept: FAMILY MEDICINE | Facility: CLINIC | Age: 59
End: 2024-12-11
Payer: MEDICARE

## 2024-12-11 VITALS
WEIGHT: 259 LBS | DIASTOLIC BLOOD PRESSURE: 91 MMHG | TEMPERATURE: 98 F | HEIGHT: 74 IN | OXYGEN SATURATION: 100 % | SYSTOLIC BLOOD PRESSURE: 139 MMHG | RESPIRATION RATE: 18 BRPM | HEART RATE: 63 BPM | BODY MASS INDEX: 33.24 KG/M2

## 2024-12-11 DIAGNOSIS — G56.01 CARPAL TUNNEL SYNDROME OF RIGHT WRIST: ICD-10-CM

## 2024-12-11 DIAGNOSIS — M54.50 LOW BACK PAIN, UNSPECIFIED BACK PAIN LATERALITY, UNSPECIFIED CHRONICITY, UNSPECIFIED WHETHER SCIATICA PRESENT: Primary | ICD-10-CM

## 2024-12-11 PROCEDURE — 3080F DIAST BP >= 90 MM HG: CPT | Mod: ,,, | Performed by: INTERNAL MEDICINE

## 2024-12-11 PROCEDURE — 1159F MED LIST DOCD IN RCRD: CPT | Mod: ,,, | Performed by: INTERNAL MEDICINE

## 2024-12-11 PROCEDURE — 3008F BODY MASS INDEX DOCD: CPT | Mod: ,,, | Performed by: INTERNAL MEDICINE

## 2024-12-11 PROCEDURE — 3075F SYST BP GE 130 - 139MM HG: CPT | Mod: ,,, | Performed by: INTERNAL MEDICINE

## 2024-12-11 PROCEDURE — 99214 OFFICE O/P EST MOD 30 MIN: CPT | Mod: ,,, | Performed by: INTERNAL MEDICINE

## 2024-12-11 RX ORDER — NAPROXEN 500 MG/1
500 TABLET ORAL 2 TIMES DAILY WITH MEALS
Qty: 60 TABLET | Refills: 0 | Status: SHIPPED | OUTPATIENT
Start: 2024-12-11

## 2024-12-11 RX ORDER — GABAPENTIN 100 MG/1
100 CAPSULE ORAL NIGHTLY
Qty: 90 CAPSULE | Refills: 0 | Status: SHIPPED | OUTPATIENT
Start: 2024-12-11
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
## 2024-12-18 ENCOUNTER — OFFICE VISIT (OUTPATIENT)
Dept: ORTHOPEDICS | Facility: CLINIC | Age: 59
End: 2024-12-18
Payer: MEDICARE

## 2024-12-18 ENCOUNTER — HOSPITAL ENCOUNTER (OUTPATIENT)
Dept: RADIOLOGY | Facility: HOSPITAL | Age: 59
Discharge: HOME | End: 2024-12-18
Attending: NURSE PRACTITIONER
Payer: MEDICARE

## 2024-12-18 VITALS
HEART RATE: 94 BPM | WEIGHT: 256.31 LBS | OXYGEN SATURATION: 98 % | SYSTOLIC BLOOD PRESSURE: 113 MMHG | HEIGHT: 76 IN | DIASTOLIC BLOOD PRESSURE: 72 MMHG | BODY MASS INDEX: 31.21 KG/M2

## 2024-12-18 DIAGNOSIS — M25.531 RIGHT WRIST PAIN: ICD-10-CM

## 2024-12-18 DIAGNOSIS — M25.531 RIGHT WRIST PAIN: Primary | ICD-10-CM

## 2024-12-18 DIAGNOSIS — M19.011 OSTEOARTHRITIS OF RIGHT SHOULDER, UNSPECIFIED OSTEOARTHRITIS TYPE: ICD-10-CM

## 2024-12-18 DIAGNOSIS — G56.01 CARPAL TUNNEL SYNDROME OF RIGHT WRIST: ICD-10-CM

## 2024-12-18 DIAGNOSIS — M25.511 RIGHT SHOULDER PAIN, UNSPECIFIED CHRONICITY: ICD-10-CM

## 2024-12-18 PROCEDURE — 99215 OFFICE O/P EST HI 40 MIN: CPT | Mod: PBBFAC,25 | Performed by: NURSE PRACTITIONER

## 2024-12-18 PROCEDURE — 73110 X-RAY EXAM OF WRIST: CPT | Mod: TC,RT

## 2024-12-18 PROCEDURE — 3078F DIAST BP <80 MM HG: CPT | Mod: CPTII,,, | Performed by: NURSE PRACTITIONER

## 2024-12-18 PROCEDURE — 73110 X-RAY EXAM OF WRIST: CPT | Mod: 26,RT,, | Performed by: RADIOLOGY

## 2024-12-18 PROCEDURE — 1159F MED LIST DOCD IN RCRD: CPT | Mod: CPTII,,, | Performed by: NURSE PRACTITIONER

## 2024-12-18 PROCEDURE — 99214 OFFICE O/P EST MOD 30 MIN: CPT | Mod: S$PBB,,, | Performed by: NURSE PRACTITIONER

## 2024-12-18 PROCEDURE — 99999 PR PBB SHADOW E&M-EST. PATIENT-LVL V: CPT | Mod: PBBFAC,,, | Performed by: NURSE PRACTITIONER

## 2024-12-18 PROCEDURE — 3008F BODY MASS INDEX DOCD: CPT | Mod: CPTII,,, | Performed by: NURSE PRACTITIONER

## 2024-12-18 PROCEDURE — 3074F SYST BP LT 130 MM HG: CPT | Mod: CPTII,,, | Performed by: NURSE PRACTITIONER

## 2024-12-18 RX ORDER — NAPROXEN 500 MG/1
500 TABLET ORAL 2 TIMES DAILY WITH MEALS
Qty: 30 TABLET | Refills: 0 | Status: SHIPPED | OUTPATIENT
Start: 2024-12-18

## 2024-12-18 NOTE — PROGRESS NOTES
HPI:   Hola Burrell is a pleasant 59 y.o. patient who reports to clinic for evaluation of right wrist pain.     Injury onset and description: He states he has been having pain for a couple of weeks.  Reports he is very active, does a lot of painting.  He is hurting ride over his wrist joint.  He is also complaining of right shoulder pain.  We did check him for his shoulder back in September.  He is unable to have an MRI.  He reports his shoulder has been dislocating for years.  He is able to pop it back into place on his own.  It is painful to lift overhead and across his body.  He does have metal fragments in his shoulder on x-ray.  Also reports he has metal fragments in his back.  No specific injury  Patient has painful ROM.   Patient's occupation:   This is not a work related injury.   This injury has been non-responsive to conservative care. The pain is worse with repetitive use, and strenuous activity is very difficult.    his pain improves with rest.  he rates pain as a  10/10on the Visual Analog Scale.        PAST MEDICAL HISTORY:   Past Medical History:   Diagnosis Date    Alcohol dependence with uncomplicated intoxication     Asthma     Bilateral primary osteoarthritis of hip     Bipolar disorder     Drug use     Herpes simplex     hyperlipidemia     Hypertension     Syncope and collapse      PAST SURGICAL HISTORY:   History reviewed. No pertinent surgical history.  MEDICATIONS:    Current Outpatient Medications:     cariprazine (VRAYLAR) 3 mg Cap, Take 1 capsule (3 mg total) by mouth once daily., Disp: 30 capsule, Rfl: 2    carvediloL (COREG) 6.25 MG tablet, TAKE 1 TABLET BY MOUTH TWICE DAILY WITH MEALS, Disp: 60 tablet, Rfl: 0    celecoxib (CELEBREX) 200 MG capsule, Take 200 mg by mouth daily as needed., Disp: , Rfl:     fluticasone propionate (FLONASE) 50 mcg/actuation nasal spray, 1 spray (50 mcg total) by Each Nostril route once daily., Disp: 16 g, Rfl: 2    gabapentin (NEURONTIN) 100 MG  "capsule, Take 1 capsule (100 mg total) by mouth every evening., Disp: 90 capsule, Rfl: 0    HYDROcodone-acetaminophen (NORCO) 7.5-325 mg per tablet, Take 1 tablet by mouth 2 (two) times daily as needed for Pain., Disp: 8 tablet, Rfl: 0    naproxen (NAPROSYN) 500 MG tablet, Take 1 tablet (500 mg total) by mouth 2 (two) times daily with meals., Disp: 60 tablet, Rfl: 0    rosuvastatin (CRESTOR) 40 MG Tab, Take 1 tablet (40 mg total) by mouth every evening., Disp: 90 tablet, Rfl: 3    sildenafiL (VIAGRA) 100 MG tablet, Take 1 tablet (100 mg total) by mouth daily as needed for Erectile Dysfunction., Disp: 10 tablet, Rfl: 3    tamsulosin (FLOMAX) 0.4 mg Cap, Take 2 capsules by mouth once daily, Disp: 90 capsule, Rfl: 0    zaleplon (SONATA) 10 MG capsule, Take 10 mg by mouth nightly as needed., Disp: , Rfl:   ALLERGIES:   Review of patient's allergies indicates:   Allergen Reactions    Desyrel [trazodone]          PHYSICAL EXAM:  VITAL SIGNS: /72   Pulse 94   Ht 6' 4" (1.93 m)   Wt 116.3 kg (256 lb 4.8 oz)   SpO2 98%   BMI 31.20 kg/m²   General: Well-developed well-nourished 59 y.o. malein no acute distress;Cardiovascular: Regular rhythm by palpation of distal pulse, normal color and temperature, no concerning varicosities on symptomatic side Lungs: No labored breathing or wheezing appreciated Neuro: Alert and oriented ×3 Psychiatric: well oriented to person, place and time, demonstrates normal mood and affect Skin: No rashes, lesions or ulcers, normal temperature, turgor, and texture on uninvolved extremity                Right Hand/Wrist Exam     Inspection   Scars: Wrist - present   Effusion: Wrist - absent   Bruising: Wrist - absent     Tenderness   The patient is tender to palpation of the ulnar area and radial area.    Range of Motion     Wrist   Extension:  normal   Flexion:  normal   Pronation:  normal   Supination:  normal       Right Shoulder Exam     Inspection/Observation   Swelling: " absent  Bruising: absent    Tenderness   The patient is tender to palpation of the greater tuberosity, supraspinatus and biceps tendon.    Range of Motion   Active abduction:  abnormal   Passive abduction:  normal   Extension:  normal     Tests & Signs   Cross arm: positive  Speed's Test: positive    Other   Sensation: normal    Vascular Exam     Right Pulses      Radial:                    2+      Capillary Refill  Right Hand: normal capillary refill        IMAGING:  No results found.    EXAMINATION:  XR WRIST COMPLETE, 3 VIEWS, RIGHT     CLINICAL HISTORY:  Right wrist pain.     TECHNIQUE:  PA, lateral, oblique views of right wrist obtained.     COMPARISON:  No past imaging of the right wrist at this time.     Impression:     No acute osseous abnormality with no acute fracture, dislocation or osseous destruction.     Chronic healed fracture of 5th metacarpal with palmar bowing of distal 5th metacarpal.     Mild osteoarthritis of right wrist with slight narrowing at radial-carpal and greater multangular-1st metacarpal joints, a few tiny marginal osteophytes and a subchondral cyst at distal navicular bone.        Electronically signed by:Kali Reynaga  Date:                                            12/18/2024  ASSESSMENT:      ICD-10-CM ICD-9-CM   1. Carpal tunnel syndrome of right wrist  G56.01 354.0       PLAN:     -Findings and treatment options were discussed with the patient  -All questions answered  We will order a CT of his right shoulder.  We will put him in a removable wrist brace today.  Naproxen p.o. b.i.d..  We will call with results    There are no Patient Instructions on file for this visit.  No orders of the defined types were placed in this encounter.    Procedures

## 2024-12-19 DIAGNOSIS — M54.16 LUMBAR RADICULOPATHY: Primary | ICD-10-CM

## 2024-12-20 ENCOUNTER — HOSPITAL ENCOUNTER (OUTPATIENT)
Dept: RADIOLOGY | Facility: HOSPITAL | Age: 59
Discharge: HOME | End: 2024-12-20
Attending: ORTHOPAEDIC SURGERY
Payer: MEDICARE

## 2024-12-20 ENCOUNTER — OFFICE VISIT (OUTPATIENT)
Dept: SPINE | Facility: CLINIC | Age: 59
End: 2024-12-20
Payer: MEDICARE

## 2024-12-20 VITALS — HEIGHT: 76 IN | BODY MASS INDEX: 31.17 KG/M2 | WEIGHT: 256 LBS

## 2024-12-20 DIAGNOSIS — M54.16 LUMBAR RADICULOPATHY: ICD-10-CM

## 2024-12-20 DIAGNOSIS — M54.50 LOW BACK PAIN, UNSPECIFIED BACK PAIN LATERALITY, UNSPECIFIED CHRONICITY, UNSPECIFIED WHETHER SCIATICA PRESENT: Primary | ICD-10-CM

## 2024-12-20 DIAGNOSIS — F17.210 NICOTINE DEPENDENCE, CIGARETTES, UNCOMPLICATED: ICD-10-CM

## 2024-12-20 PROCEDURE — 99999 PR PBB SHADOW E&M-EST. PATIENT-LVL IV: CPT | Mod: PBBFAC,,, | Performed by: ORTHOPAEDIC SURGERY

## 2024-12-20 PROCEDURE — 72110 X-RAY EXAM L-2 SPINE 4/>VWS: CPT | Mod: 26,,, | Performed by: ORTHOPAEDIC SURGERY

## 2024-12-20 PROCEDURE — 99214 OFFICE O/P EST MOD 30 MIN: CPT | Mod: PBBFAC,25 | Performed by: ORTHOPAEDIC SURGERY

## 2024-12-20 PROCEDURE — 72110 X-RAY EXAM L-2 SPINE 4/>VWS: CPT | Mod: TC

## 2024-12-20 NOTE — PROGRESS NOTES
AP, lateral, flexion/extension views of the lumbar spine reviewed    On the AP there is lumbar curvature with trunk shift to the left.  There are 5 non-rib-bearing lumbar vertebrae.  On the lateral there is decreased lumbar lordosis.  There is spondylotic disease with decreased disc height and osteophyte formation noted.  No fractures or listhesis noted.  No instability on flexion-extension views.    Impression:  Spondylotic changes of the lumbar spine as noted above

## 2024-12-20 NOTE — PROGRESS NOTES
MDM/time:  30-35 minutes spent on this encounter including 10 minutes reviewing imaging and notes, 15 minutes with the patient, 5 minutes documentation    ASSESSMENT:  59 y.o. male with lumbar scoliosis with lumbar spondylosis and radiculopathy    PLAN:  Referral to pain management - [facility]   Follow up as needed     HPI:  59 y.o. male here for evaluation of low back pain that radiates into bilateral legs.  Patient has been seen in our office in the past 8/3/2022.  Reports a history of back pain since was in high school after being diagnosed with scoliosis.  Reports a history of gout in his knees and ankles.  Decreased ability to walk over the past 3 weeks.  Patient reports  strength issues in hands with a history of carpal tunnel.  Balance issues no recent falls.  Denies bladder bowel incontinence.  Decreased walking tolerance due to pain.  Currently takes naproxen in the morning and Neurontin 100 mg at bedtime.  No recent physical therapy he had some back in 2022 that did not improve his pain.  No prior pain management.  No recent MRI.  No prior spine surgery.  Patient currently smokes a half pack a day.    IMAGING:  AP, lateral, flexion/extension views of the lumbar spine reviewed     On the AP there is lumbar curvature with trunk shift to the left.  There are 5 non-rib-bearing lumbar vertebrae.  On the lateral there is decreased lumbar lordosis.  There is spondylotic disease with decreased disc height and osteophyte formation noted.  No fractures or listhesis noted.  No instability on flexion-extension views.     Impression:  Spondylotic changes of the lumbar spine as noted above     MRI lumbar spine 08/03/2022 reviewed shows:  At L3-4 there is moderate bilateral lateral recess stenosis.  Moderate to severe right foraminal stenosis  At L4-5 there is severe left greater than right lateral recess stenosis.  Moderate bilateral foraminal stenosis  At L5-S1 there is moderate right lateral recess stenosis.  Severe  left and moderate right foraminal stenosis     Past Medical History:   Diagnosis Date    Alcohol dependence with uncomplicated intoxication     Asthma     Bilateral primary osteoarthritis of hip     Bipolar disorder     Drug use     Herpes simplex     hyperlipidemia     Hypertension     Syncope and collapse      History reviewed. No pertinent surgical history.  Social History     Tobacco Use    Smoking status: Every Day     Current packs/day: 1.50     Average packs/day: 1.5 packs/day for 40.0 years (60.0 ttl pk-yrs)     Types: Cigarettes     Passive exposure: Past    Smokeless tobacco: Current    Tobacco comments:     vapoing   Substance Use Topics    Alcohol use: Not Currently    Drug use: Yes     Types: Marijuana      Current Outpatient Medications   Medication Instructions    carvediloL (COREG) 6.25 mg, Oral, 2 times daily with meals    celecoxib (CELEBREX) 200 mg, Daily PRN    fluticasone propionate (FLONASE) 50 mcg, Each Nostril, Daily    gabapentin (NEURONTIN) 100 mg, Oral, Nightly    HYDROcodone-acetaminophen (NORCO) 7.5-325 mg per tablet 1 tablet, Oral, 2 times daily PRN    naproxen (NAPROSYN) 500 mg, Oral, 2 times daily with meals    rosuvastatin (CRESTOR) 40 mg, Oral, Nightly    sildenafiL (VIAGRA) 100 mg, Oral, Daily PRN    tamsulosin (FLOMAX) 0.8 mg, Oral    VRAYLAR 3 mg, Oral, Daily    zaleplon (SONATA) 10 mg, Nightly PRN        EXAM:  Constitutional  General Appearance:  Body mass index is 31.16 kg/m²., NAD  Psychiatric   Orientation: Oriented to time, oriented to place, oriented to person  Mood and Affect: Active and alert, normal mood, normal affect  Gait and Station   Appearance:  Antalgic gait, unable to tandem gait, unable to walk on toes, unable to walk on heels    LUMBAR  Musculoskeletal System   Hips: Normal appearance, no leg length discrepancy, normal motion; left, normal motion; right    Lumbar Spine                   Inspection:  Normal alignment, normal sagittal balance                   Range of motion:  Decreased flexion, extension, lateral bending, rotation. Pain with range of motion                  Bony Palpation of the Lumbar Spine:  No tenderness of the spinous process, no tenderness of the sacrum, no tenderness of the coccyx                  Bony Palpation of the Right Hip:  No tenderness of the iliac crest, no tenderness of the sciatic notch, no tenderness of the SI joint                  Bony Palpation of the Left Hip:  No tenderness of the iliac crest, no tenderness of the sciatic notch, no tenderness of the SI joint                  Soft Tissue Palpation on the Right:  No tenderness of the paraspinal region, no tenderness of the iliolumbar region                  Soft Tissue Palpation on the Left:  No tenderness of the paraspinal region, no tenderness of the iliolumbar region    Motor Strength   L1 Right:  Hip flexion iliopsoas 4/5    L1 Left:  Hip flexion iliopsoas 4/5              L2-L4 Right:  Knee extension quadriceps 5/5, tibialis anterior 5/5              L2-L4 Left:  Knee extension quadriceps 5/5, tibialis anterior 5/5   L5 Right:  Extensor hallucis llongus 5/5,    L5 Left:  Extensor hallucis longus 5/5,    S1 Right:  Plantar flexion gastrocnemius 5/5   S1 Left:  Plantar flexion gastrocnemius 5/5    Neurological System   Ankle Reflex Right:  normal   Ankle Reflex Left: normal   Knee Reflex Right:  normal   Knee Reflex Left:  normal   Sensation on the Right:  L2 normal, L3 normal, L4 normal, L5 normal, S1 normal   Sensation on the Left:  L2 normal, L3 normal, L4 normal, L5 normal, S1 normal              Special Test on the Right:  Seated straight leg raising test negative, no clonus of the ankle              Special Test on the Left:  Seated straight leg raising test negative, no clonus of the ankle    Skin   Lumbosacral Spine:  Normal skin    Cardiovascular System   Arterial Pulses Right:  Posterior tibialis normal, dorsalis pedis normal   Arterial Pulses Left:  Posterior tibialis  normal, dorsalis pedis normal   Edema Right: None   Edema Left:  None

## 2024-12-20 NOTE — PROGRESS NOTES
Smoking Cessation counseling    Time spent:  3-10 minutes (31709)    We discussed the importance, over both the short and long-term, of smoking cessation; reviewed the patient's willingness to quit; overall history and current use of tobacco smoking products; that there are a variety of methods to help with smoking diminution and cessation (stopping alone, using nicotine substitution medications/gums, Chantix, other medications, etcetera, which the patient will also discuss with his or her primary care physician); that the patient should set a date for smoking cessation; and the patient will follow up with his or her primary care physician for further support and oversight.    We also discussed that for the patient to have surgery while using nicotine, wound healing may be compromised relative to those who do not smoke; that recovery from anesthesia may sometimes be more difficult; and that quitting may decrease the heart, vascular, pulmonary effects in the short term as well as over the long term.  Smoking cessation may be useful and important for any patient who will have a fusion as part of surgery or have bone or tissue that has regrown heal (bone fusions, wound closures, etc.)  since surgical results with respect to wound healing, susceptibility to recovery from infection, bone fusion, and tissue and blood vessel health may be impaired or less optimal in smokers than nonsmokers.  We talked about the elevated risk of surgical bone fusion failure in the setting of smoking and the potential need for a higher-risk revision surgery should fusion not occur.    I reviewed this with the patient in detail and all questions were answered.  We discussed nature of the patient's condition; real alternatives and realistic options; pros and cons, risks and benefits of all the options, in detail.  I believe the patient understands the above and wishes to proceed as outlined.

## 2024-12-30 ENCOUNTER — HOSPITAL ENCOUNTER (OUTPATIENT)
Dept: RADIOLOGY | Facility: HOSPITAL | Age: 59
Discharge: HOME | End: 2024-12-30
Attending: NURSE PRACTITIONER
Payer: MEDICARE

## 2024-12-30 DIAGNOSIS — M25.511 RIGHT SHOULDER PAIN, UNSPECIFIED CHRONICITY: ICD-10-CM

## 2024-12-30 PROCEDURE — 73200 CT UPPER EXTREMITY W/O DYE: CPT | Mod: 26,RT,, | Performed by: RADIOLOGY

## 2024-12-30 PROCEDURE — 73200 CT UPPER EXTREMITY W/O DYE: CPT | Mod: TC,RT

## 2024-12-31 ENCOUNTER — HOSPITAL ENCOUNTER (EMERGENCY)
Facility: HOSPITAL | Age: 59
Discharge: HOME | End: 2024-12-31
Payer: MEDICARE

## 2024-12-31 VITALS
OXYGEN SATURATION: 100 % | TEMPERATURE: 98 F | WEIGHT: 262 LBS | HEART RATE: 77 BPM | SYSTOLIC BLOOD PRESSURE: 169 MMHG | DIASTOLIC BLOOD PRESSURE: 100 MMHG | HEIGHT: 76 IN | BODY MASS INDEX: 31.9 KG/M2 | RESPIRATION RATE: 18 BRPM

## 2024-12-31 DIAGNOSIS — M79.89 RIGHT LEG SWELLING: ICD-10-CM

## 2024-12-31 PROCEDURE — 99284 EMERGENCY DEPT VISIT MOD MDM: CPT | Mod: 25

## 2024-12-31 NOTE — DISCHARGE INSTRUCTIONS
Follow-up with your pain treatment doctor.  Follow up with your primary care provider in 2 days. Return to the emergency department for any increase in symptoms or for any other new or worrisome symptoms.

## 2024-12-31 NOTE — ED NOTES
Pt to nurses station asking if he can leave and have a follow-up appointment to review his results, and that he is hungry and irritated. Rupali Figueroa FNP made aware and wants to wait for ultrasound results before giving pt something to eat.

## 2024-12-31 NOTE — ED PROVIDER NOTES
Encounter Date: 12/31/2024       History     Chief Complaint   Patient presents with    Leg Pain     Sent from Dr. Yañez for possible DVT, pt has paper with him     59-year-old male presents to the emergency department to be evaluated for right leg swelling.  He has been having right knee pain for a couple of weeks.  He was evaluated by his primary care provider who [facility].  [facility] and was sent to the emergency department to have a venous Doppler was right lower extremity to rule out DVT.  He denies any chest pain or shortness of breath.    The history is provided by the patient.   Leg Pain   There was no injury mechanism. The incident occurred several weeks ago. Pertinent negatives include no numbness, no inability to bear weight, no loss of motion, no muscle weakness, no loss of sensation and no tingling.     Review of patient's allergies indicates:   Allergen Reactions    Desyrel [trazodone]      Past Medical History:   Diagnosis Date    Alcohol dependence with uncomplicated intoxication     Asthma     Bilateral primary osteoarthritis of hip     Bipolar disorder     Drug use     Herpes simplex     hyperlipidemia     Hypertension     Syncope and collapse      History reviewed. No pertinent surgical history.  Family History   Problem Relation Name Age of Onset    Cancer Mother      Cancer Father      Cancer Sister      Mental illness Sister      Cancer Brother      Diabetes Maternal Aunt      Diabetes Maternal Uncle      Hypertension Maternal Grandfather       Social History     Tobacco Use    Smoking status: Every Day     Current packs/day: 1.50     Average packs/day: 1.5 packs/day for 40.0 years (60.0 ttl pk-yrs)     Types: Cigarettes     Passive exposure: Past    Smokeless tobacco: Current    Tobacco comments:     vapoing   Substance Use Topics    Alcohol use: Not Currently    Drug use: Yes     Types: Marijuana     Review of Systems   Respiratory:   Negative for chest tightness and shortness of breath.    Cardiovascular:  Positive for leg swelling (Right leg swelling). Negative for chest pain.   Gastrointestinal:  Negative for nausea and vomiting.   Neurological:  Negative for tingling and numbness.   All other systems reviewed and are negative.      Physical Exam     Initial Vitals   BP Pulse Resp Temp SpO2   12/31/24 1016 12/31/24 1014 12/31/24 1014 12/31/24 1014 12/31/24 1014   (!) 160/108 75 18 98 °F (36.7 °C) 97 %      MAP       --                Physical Exam    Vitals reviewed.  Constitutional: He appears well-developed and well-nourished.   Neck: Neck supple.   Cardiovascular:  Normal rate and regular rhythm.           Pulses:       Dorsalis pedis pulses are 3+ on the right side.   Pulmonary/Chest: Breath sounds normal.   Abdominal: Abdomen is soft. He exhibits no distension and no mass. There is no abdominal tenderness. There is no rebound and no guarding.   Musculoskeletal:         General: Edema (1+ pitting edema right lower ectremity) present. No tenderness.      Cervical back: Neck supple.     Neurological: He is alert and oriented to person, place, and time. He has normal strength. GCS score is 15. GCS eye subscore is 4. GCS verbal subscore is 5. GCS motor subscore is 6.   Skin: Skin is warm and dry. Capillary refill takes less than 2 seconds.   Psychiatric: He has a normal mood and affect.         Medical Screening Exam   See Full Note    ED Course   Procedures  Labs Reviewed - No data to display       Imaging Results              US Lower Extremity Veins Right (Final result)  Result time 12/31/24 13:34:31      Final result by Hang Ramirez MD (12/31/24 13:34:31)                   Impression:      1. No evidence of right lower extremity deep vein thrombosis.  2. Well-marginated hypoechoic mass at the popliteal fossa coursing to the medial calf, likely a complex Baker cyst.  If there has been any recent trauma, hematoma could also give this  appearance.      Electronically signed by: Hang Ramirez  Date:    12/31/2024  Time:    13:34               Narrative:    EXAMINATION:  US LOWER EXTREMITY VEINS RIGHT    CLINICAL HISTORY:  Right leg pain    COMPARISON:  None.    FINDINGS:  Color and duplex Doppler sonographic assessment with spectral analysis of the right lower extremity demonstrates no evidence of deep vein thrombosis. Normal color Doppler flow, augmentation, and compressibility are demonstrated throughout.    There is an oblong hypoechoic well-marginated mass at the popliteal fossa, coursing distally to the medial calf.  This measures 2.1 cm in greatest transverse dimension and at least 8-9 cm in length.  This is likely a complex Baker cyst.  Hematoma could give this appearance if there has been any recent trauma.                                       Medications - No data to display  Medical Decision Making  59-year-old male presents to the emergency department to be evaluated for right leg swelling.  He has been having right knee pain for a couple of weeks.  He was evaluated by his primary care provider who [facility]  [facility] and was sent to the emergency department to have a venous Doppler was right lower extremity to rule out DVT.  He denies any chest pain or shortness of breath.  Venous Doppler negative for DVT, significant for probable Baker cyst  Diagnosis: Right leg swelling                                      Clinical Impression:   Final diagnoses:  [M79.89] Right leg swelling        ED Disposition Condition    Discharge Stable          ED Prescriptions    None       Follow-up Information    None          Rupali Eisenberg FNP  12/31/24 1651

## 2025-01-02 ENCOUNTER — OFFICE VISIT (OUTPATIENT)
Dept: FAMILY MEDICINE | Facility: CLINIC | Age: 60
End: 2025-01-02
Payer: MEDICARE

## 2025-01-02 VITALS
SYSTOLIC BLOOD PRESSURE: 164 MMHG | WEIGHT: 261 LBS | DIASTOLIC BLOOD PRESSURE: 99 MMHG | HEIGHT: 76 IN | RESPIRATION RATE: 18 BRPM | BODY MASS INDEX: 31.78 KG/M2 | TEMPERATURE: 98 F | HEART RATE: 74 BPM | OXYGEN SATURATION: 99 %

## 2025-01-02 DIAGNOSIS — M25.561 PAIN IN BOTH KNEES, UNSPECIFIED CHRONICITY: Primary | ICD-10-CM

## 2025-01-02 DIAGNOSIS — I10 PRIMARY HYPERTENSION: ICD-10-CM

## 2025-01-02 DIAGNOSIS — M25.562 PAIN IN BOTH KNEES, UNSPECIFIED CHRONICITY: Primary | ICD-10-CM

## 2025-01-02 PROCEDURE — 99214 OFFICE O/P EST MOD 30 MIN: CPT | Mod: ,,, | Performed by: INTERNAL MEDICINE

## 2025-01-02 PROCEDURE — 3080F DIAST BP >= 90 MM HG: CPT | Mod: ,,, | Performed by: INTERNAL MEDICINE

## 2025-01-02 PROCEDURE — 3008F BODY MASS INDEX DOCD: CPT | Mod: ,,, | Performed by: INTERNAL MEDICINE

## 2025-01-02 PROCEDURE — 3077F SYST BP >= 140 MM HG: CPT | Mod: ,,, | Performed by: INTERNAL MEDICINE

## 2025-01-02 RX ORDER — HYDROCODONE BITARTRATE AND ACETAMINOPHEN 7.5; 325 MG/1; MG/1
1 TABLET ORAL DAILY PRN
Qty: 8 TABLET | Refills: 0 | Status: SHIPPED | OUTPATIENT
Start: 2025-01-02

## 2025-01-02 RX ORDER — FUROSEMIDE 20 MG/1
20 TABLET ORAL DAILY PRN
Qty: 30 TABLET | Refills: 0 | Status: SHIPPED | OUTPATIENT
Start: 2025-01-02 | End: 2025-01-27

## 2025-01-02 RX ORDER — TAMSULOSIN HYDROCHLORIDE 0.4 MG/1
2 CAPSULE ORAL DAILY
Qty: 90 CAPSULE | Refills: 0 | Status: SHIPPED | OUTPATIENT
Start: 2025-01-02

## 2025-01-02 RX ORDER — NAPROXEN 500 MG/1
500 TABLET ORAL 2 TIMES DAILY WITH MEALS
Qty: 30 TABLET | Refills: 0 | Status: SHIPPED | OUTPATIENT
Start: 2025-01-02

## 2025-01-02 RX ORDER — GABAPENTIN 100 MG/1
100 CAPSULE ORAL NIGHTLY
Qty: 90 CAPSULE | Refills: 0 | Status: SHIPPED | OUTPATIENT
Start: 2025-01-02

## 2025-01-02 RX ORDER — CARVEDILOL 6.25 MG/1
6.25 TABLET ORAL 2 TIMES DAILY WITH MEALS
Qty: 60 TABLET | Refills: 0 | Status: SHIPPED | OUTPATIENT
Start: 2025-01-02 | End: 2025-02-03

## 2025-01-02 RX ORDER — CARIPRAZINE 3 MG/1
3 CAPSULE, GELATIN COATED ORAL DAILY
Qty: 30 CAPSULE | Refills: 2 | Status: SHIPPED | OUTPATIENT
Start: 2025-01-02

## 2025-01-02 RX ORDER — FLUTICASONE PROPIONATE 50 MCG
1 SPRAY, SUSPENSION (ML) NASAL DAILY
Qty: 16 G | Refills: 2 | Status: SHIPPED | OUTPATIENT
Start: 2025-01-02

## 2025-01-02 RX ORDER — ROSUVASTATIN CALCIUM 40 MG/1
40 TABLET, COATED ORAL NIGHTLY
Qty: 90 TABLET | Refills: 3 | Status: SHIPPED | OUTPATIENT
Start: 2025-01-02 | End: 2026-01-02

## 2025-01-06 ENCOUNTER — PATIENT OUTREACH (OUTPATIENT)
Dept: EMERGENCY MEDICINE | Facility: HOSPITAL | Age: 60
End: 2025-01-06
Payer: MEDICARE

## 2025-01-06 NOTE — PROGRESS NOTES
ED navigator attempted to contact patient to assist with scheduling a post ED 7 day follow up with PCP. Unable to reach patient or leave a message at this time.    Cecilia Quinones ED Navigator   [phone]

## 2025-01-07 ENCOUNTER — OFFICE VISIT (OUTPATIENT)
Dept: ORTHOPEDICS | Facility: CLINIC | Age: 60
End: 2025-01-07
Payer: MEDICARE

## 2025-01-07 ENCOUNTER — HOSPITAL ENCOUNTER (OUTPATIENT)
Dept: RADIOLOGY | Facility: HOSPITAL | Age: 60
Discharge: HOME | End: 2025-01-07
Attending: NURSE PRACTITIONER
Payer: MEDICARE

## 2025-01-07 DIAGNOSIS — M25.561 PAIN IN BOTH KNEES, UNSPECIFIED CHRONICITY: Primary | ICD-10-CM

## 2025-01-07 DIAGNOSIS — M10.9 GOUT, UNSPECIFIED CAUSE, UNSPECIFIED CHRONICITY, UNSPECIFIED SITE: ICD-10-CM

## 2025-01-07 DIAGNOSIS — M25.561 PAIN IN BOTH KNEES, UNSPECIFIED CHRONICITY: ICD-10-CM

## 2025-01-07 DIAGNOSIS — M25.562 PAIN IN BOTH KNEES, UNSPECIFIED CHRONICITY: ICD-10-CM

## 2025-01-07 DIAGNOSIS — M25.562 PAIN IN BOTH KNEES, UNSPECIFIED CHRONICITY: Primary | ICD-10-CM

## 2025-01-07 PROCEDURE — 73562 X-RAY EXAM OF KNEE 3: CPT | Mod: 26,50,, | Performed by: RADIOLOGY

## 2025-01-07 PROCEDURE — 99213 OFFICE O/P EST LOW 20 MIN: CPT | Mod: PBBFAC,25 | Performed by: NURSE PRACTITIONER

## 2025-01-07 PROCEDURE — 99999PBSHW PR PBB SHADOW TECHNICAL ONLY FILED TO HB: Mod: PBBFAC,,,

## 2025-01-07 PROCEDURE — 20610 DRAIN/INJ JOINT/BURSA W/O US: CPT | Mod: PBBFAC,LT | Performed by: NURSE PRACTITIONER

## 2025-01-07 PROCEDURE — 99999 PR PBB SHADOW E&M-EST. PATIENT-LVL III: CPT | Mod: PBBFAC,,, | Performed by: NURSE PRACTITIONER

## 2025-01-07 PROCEDURE — 73562 X-RAY EXAM OF KNEE 3: CPT | Mod: TC,50

## 2025-01-07 PROCEDURE — 20610 DRAIN/INJ JOINT/BURSA W/O US: CPT | Mod: PBBFAC,RT | Performed by: NURSE PRACTITIONER

## 2025-01-07 RX ADMIN — TRIAMCINOLONE ACETONIDE 40 MG: 40 INJECTION, SUSPENSION INTRA-ARTICULAR; INTRAMUSCULAR at 09:01

## 2025-01-07 NOTE — PROGRESS NOTES
ASSESSMENT:      ICD-10-CM ICD-9-CM   1. Pain in both knees, unspecified chronicity  M25.561 719.46    M25.562        PLAN:     Findings and treatment options were discussed with the patient regarding the diagnosis.   All questions were answered regarding Hola Burrell 's painful knee.      Natural history and expected course discussed. Questions answered. Educational materials distributed. Reduction in offending activity. OTC analgesics as needed. Arthrocentesis. See procedure note.  Bilateral knee injections today.  We will order uric acid level.  He will need to follow up with Dr. Abrams.  There are no Patient Instructions on file for this visit.    IMAGING:   EXAMINATION:  XR KNEE 3 VIEW BILATERAL     CLINICAL HISTORY:  Pain in right knee     TECHNIQUE:  AP, lateral, and Merchant views of both knees were performed.     COMPARISON:  None     FINDINGS:  No fracture, dislocation, or joint effusion.  Both knees demonstrate moderate medial compartment joint space loss.     Impression:     Moderate bilateral medial compartment joint space loss.        Electronically signed by:Tj Vee MD  Date:                                            01/08/2025  Time:                                           15:01        Exam Ended: 01/07/25 09:22 CST Last Resulted: 01/08/25 15:01 CST       CC: Knee pain    59 y.o. Male who presents as a new patient to me for evaluation of  bilateral knee pain with the right being worse.  He does have some swelling on his right knee today.  Also complaining of ankle pain.  Reports he thinks he has gout.  He has never been worked up for gout.  Reports his right knee is very painful to walk on a this time.  He did arrive in a wheelchair today.  He has had no specific injury  Occupation:   Pain has been present for a couple of weeks.  Injury description No recent injury. He states he had an old injury  Mechanical symptoms, such as clicking, locking, and popping are present.     Swelling and effusions  are present.   The patient does  describe symptoms of knee instability, such as the knee buckling and the knee giving way.   Symptoms are worsened with activity.  Better with rest. Treatment thus far has included rest, activity modifications, and oral medications.    he  has not had formal physical therapy.  he has not had prior injections injections into the knee.   he has not had previous advanced imaging such as MRI.     Here today to discuss diagnosis and treatment options.          REVIEW OF SYSTEMS:   Constitution: Negative. Negative for chills, fever and night sweats.    Hematologic/Lymphatic: Negative for bleeding problem. Does not bruise/bleed easily.   Skin: Negative for dry skin, itching and rash.   Musculoskeletal: Negative for falls. Positive for knee pain and muscle weakness.     All other review of symptoms were reviewed and found to be noncontributory.     PAST MEDICAL HISTORY:   Past Medical History:   Diagnosis Date    Alcohol dependence with uncomplicated intoxication     Asthma     Bilateral primary osteoarthritis of hip     Bipolar disorder     Drug use     Herpes simplex     hyperlipidemia     Hypertension     Syncope and collapse        PAST SURGICAL HISTORY:   History reviewed. No pertinent surgical history.    FAMILY HISTORY:   Family History   Problem Relation Name Age of Onset    Cancer Mother      Cancer Father      Cancer Sister      Mental illness Sister      Cancer Brother      Diabetes Maternal Aunt      Diabetes Maternal Uncle      Hypertension Maternal Grandfather         SOCIAL HISTORY:   Social History     Socioeconomic History    Marital status: Single   Tobacco Use    Smoking status: Every Day     Current packs/day: 1.50     Average packs/day: 1.5 packs/day for 40.0 years (60.0 ttl pk-yrs)     Types: Cigarettes     Passive exposure: Past    Smokeless tobacco: Current    Tobacco comments:     vapoing   Substance and Sexual Activity    Alcohol use: Not  Currently    Drug use: Yes     Types: Marijuana    Sexual activity: Not Currently     Social Drivers of Health     Financial Resource Strain: High Risk (10/17/2023)    Overall Financial Resource Strain (CARDIA)     Difficulty of Paying Living Expenses: Very hard   Food Insecurity: Food Insecurity Present (10/17/2023)    Hunger Vital Sign     Worried About Running Out of Food in the Last Year: Often true     Ran Out of Food in the Last Year: Often true   Transportation Needs: No Transportation Needs (10/17/2023)    PRAPARE - Transportation     Lack of Transportation (Medical): No     Lack of Transportation (Non-Medical): No   Physical Activity: Inactive (10/17/2023)    Exercise Vital Sign     Days of Exercise per Week: 0 days     Minutes of Exercise per Session: 0 min   Stress: Stress Concern Present (10/17/2023)    [employer] - Occupational Stress Questionnaire     Feeling of Stress : To some extent   Housing Stability: Low Risk  (10/17/2023)    Housing Stability Vital Sign     Unable to Pay for Housing in the Last Year: No     Number of Places Lived in the Last Year: 1     Unstable Housing in the Last Year: No       MEDICATIONS:     Current Outpatient Medications:     cariprazine (VRAYLAR) 3 mg Cap, Take 1 capsule (3 mg total) by mouth once daily., Disp: 30 capsule, Rfl: 2    carvediloL (COREG) 6.25 MG tablet, Take 1 tablet (6.25 mg total) by mouth 2 (two) times daily with meals., Disp: 60 tablet, Rfl: 0    celecoxib (CELEBREX) 200 MG capsule, Take 200 mg by mouth daily as needed., Disp: , Rfl:     fluticasone propionate (FLONASE) 50 mcg/actuation nasal spray, 1 spray (50 mcg total) by Each Nostril route once daily., Disp: 16 g, Rfl: 2    furosemide (LASIX) 20 MG tablet, Take 1 tablet (20 mg total) by mouth daily as needed (for swelling)., Disp: 30 tablet, Rfl: 0    gabapentin (NEURONTIN) 100 MG capsule, Take 1 capsule (100 mg total) by mouth every evening., Disp: 90 capsule, Rfl: 0     HYDROcodone-acetaminophen (NORCO) 7.5-325 mg per tablet, Take 1 tablet by mouth daily as needed for Pain. (Patient not taking: Reported on 1/7/2025), Disp: 8 tablet, Rfl: 0    naproxen (NAPROSYN) 500 MG tablet, Take 1 tablet (500 mg total) by mouth 2 (two) times daily with meals., Disp: 30 tablet, Rfl: 0    rosuvastatin (CRESTOR) 40 MG Tab, Take 1 tablet (40 mg total) by mouth every evening., Disp: 90 tablet, Rfl: 3    sildenafiL (VIAGRA) 100 MG tablet, Take 1 tablet (100 mg total) by mouth daily as needed for Erectile Dysfunction. (Patient not taking: Reported on 1/7/2025), Disp: 10 tablet, Rfl: 3    tamsulosin (FLOMAX) 0.4 mg Cap, Take 2 capsules (0.8 mg total) by mouth once daily., Disp: 90 capsule, Rfl: 0    zaleplon (SONATA) 10 MG capsule, Take 10 mg by mouth nightly as needed., Disp: , Rfl:     ALLERGIES:   Review of patient's allergies indicates:   Allergen Reactions    Desyrel [trazodone]         PHYSICAL EXAMINATION:    General    Constitutional: He is oriented to person, place, and time. He appears well-nourished.   HENT:   Head: Normocephalic and atraumatic.   Eyes: Pupils are equal, round, and reactive to light.   Neck: Neck supple.   Cardiovascular:  Normal rate and regular rhythm.            Pulmonary/Chest: Effort normal. No respiratory distress.   Abdominal: There is no abdominal tenderness. There is no guarding.   Neurological: He is alert and oriented to person, place, and time. He has normal reflexes.   Psychiatric: He has a normal mood and affect. His behavior is normal. Judgment and thought content normal.           Right Knee Exam     Inspection   Swelling: present  Effusion: present    Tenderness   The patient is tender to palpation of the medial joint line and lateral joint line.    Crepitus   The patient has crepitus of the patella.    Range of Motion   Extension:  normal   Flexion:  abnormal     Tests   Meniscus   David:  Medial - positive   Ligament Examination   Lachman: normal (-1 to  2mm)   PCL-Posterior Drawer: normal (0 to 2mm)     Patella   Patellar Tracking: normal  Patellar Grind: positive    Other   Sensation: normal    Left Knee Exam     Inspection   Swelling: present  Effusion: present    Tenderness   The patient tender to palpation of the medial joint line and lateral joint line.    Crepitus   The patient has crepitus of the patella.    Range of Motion   Extension:  normal   Flexion:  abnormal     Tests   Meniscus   David:  Medial - positive   Stability   Lachman: normal (-1 to 2mm)   PCL-Posterior Drawer: normal (0 to 2mm)  Patella   Patellar Tracking: normal    Other   Sensation: normal    Muscle Strength   Right Lower Extremity   Quadriceps:  5/5   Hamstrin/5   Left Lower Extremity   Quadriceps:  5/5   Hamstrin/5     Reflexes     Left Side  Achilles:  2+  Quadriceps:  2+    Right Side   Achilles:  2+  Quadriceps:  2+    Vascular Exam     Right Pulses  Dorsalis Pedis:      2+  Posterior Tibial:      2+        Left Pulses  Dorsalis Pedis:      2+  Posterior Tibial:      2+        Orders Placed This Encounter   Procedures    X-Ray Knee 3 View Bilateral     Standing Status:   Future     Number of Occurrences:   1     Standing Expiration Date:   2026     Order Specific Question:   May the Radiologist modify the order per protocol to meet the clinical needs of the patient?     Answer:   Yes     Order Specific Question:   Release to patient     Answer:   Immediate       Large Joint Aspiration/Injection: L supra patellar bursa    Date/Time: 2025 9:40 AM    Performed by: Julita Toribio FNP  Authorized by: Julita Toribio FNP    Consent Done?:  Yes (Verbal)  Indications:  Pain  Site marked: the procedure site was marked    Local anesthetic:  Bupivacaine 0.25% without epinephrine    Details:  Needle Size:  22 G  Location:  Knee  Site:  L supra patellar bursa  Medications:  40 mg triamcinolone acetonide 40 mg/mL  Patient tolerance:  Patient tolerated the procedure well  with no immediate complications  Large Joint Aspiration/Injection: R supra patellar bursa    Date/Time: 1/7/2025 9:40 AM    Performed by: Julita Toribio FNP  Authorized by: Julita Toribio FNP    Consent Done?:  Yes (Verbal)  Indications:  Pain  Site marked: the procedure site was marked    Local anesthetic:  Bupivacaine 0.25% without epinephrine    Details:  Needle Size:  22 G  Location:  Knee  Site:  R supra patellar bursa  Medications:  40 mg triamcinolone acetonide 40 mg/mL  Patient tolerance:  Patient tolerated the procedure well with no immediate complications

## 2025-01-07 NOTE — PROGRESS NOTES
ED navigator second attempt to contact patient to assist with scheduling a post ED 7 day follow up with PCP. Unable to reach patient at this time. ED navigator will close encounter at this time.    Cecilia Quinones ED Navigator   [phone]

## 2025-01-08 ENCOUNTER — TELEPHONE (OUTPATIENT)
Dept: ORTHOPEDICS | Facility: CLINIC | Age: 60
End: 2025-01-08
Payer: MEDICARE

## 2025-01-08 NOTE — TELEPHONE ENCOUNTER
----- Message from Ivonne sent at 1/8/2025  4:11 PM CST -----  Regarding: Requesting Lab Results  Who Called: Hola Payton Ashanti    Caller is requesting information on test results.    Name of Test (Lab/Mammo/Etc): Non Fasting Lab  Date of Test: 1/7  Where the test was performed: [facility]  Ordering Provider: Julita Toribio    Preferred Method of Contact: Phone Call  Patient's Preferred Phone Number on File: [phone]

## 2025-01-10 RX ORDER — TRIAMCINOLONE ACETONIDE 40 MG/ML
40 INJECTION, SUSPENSION INTRA-ARTICULAR; INTRAMUSCULAR
Status: DISCONTINUED | OUTPATIENT
Start: 2025-01-07 | End: 2025-01-10 | Stop reason: HOSPADM

## 2025-01-12 NOTE — PROGRESS NOTES
Subjective:       Patient ID: Hola Burrell is a 59 y.o. male.    Chief Complaint: Leg Pain and Leg Swelling    History of Present Illness    CHIEF COMPLAINT:  Patient presents today with a Baker's cyst causing pain and discomfort.    MUSCULOSKELETAL:  He reports pain and discomfort due to a Baker's cyst in the right popliteal fossa. He also experiences diffuse pain with swelling in the hand and both ankles.    CURRENT MEDICATIONS:  He is currently taking medications for allergies, blood pressure, and Flomax.         Current Medications:    Current Outpatient Medications:     celecoxib (CELEBREX) 200 MG capsule, Take 200 mg by mouth daily as needed., Disp: , Rfl:     sildenafiL (VIAGRA) 100 MG tablet, Take 1 tablet (100 mg total) by mouth daily as needed for Erectile Dysfunction. (Patient not taking: Reported on 1/7/2025), Disp: 10 tablet, Rfl: 3    zaleplon (SONATA) 10 MG capsule, Take 10 mg by mouth nightly as needed., Disp: , Rfl:     cariprazine (VRAYLAR) 3 mg Cap, Take 1 capsule (3 mg total) by mouth once daily., Disp: 30 capsule, Rfl: 2    carvediloL (COREG) 6.25 MG tablet, Take 1 tablet (6.25 mg total) by mouth 2 (two) times daily with meals., Disp: 60 tablet, Rfl: 0    fluticasone propionate (FLONASE) 50 mcg/actuation nasal spray, 1 spray (50 mcg total) by Each Nostril route once daily., Disp: 16 g, Rfl: 2    furosemide (LASIX) 20 MG tablet, Take 1 tablet (20 mg total) by mouth daily as needed (for swelling)., Disp: 30 tablet, Rfl: 0    gabapentin (NEURONTIN) 100 MG capsule, Take 1 capsule (100 mg total) by mouth every evening., Disp: 90 capsule, Rfl: 0    HYDROcodone-acetaminophen (NORCO) 7.5-325 mg per tablet, Take 1 tablet by mouth daily as needed for Pain. (Patient not taking: Reported on 1/7/2025), Disp: 8 tablet, Rfl: 0    naproxen (NAPROSYN) 500 MG tablet, Take 1 tablet (500 mg total) by mouth 2 (two) times daily with meals., Disp: 30 tablet, Rfl: 0    rosuvastatin (CRESTOR) 40 MG Tab, Take 1  "tablet (40 mg total) by mouth every evening., Disp: 90 tablet, Rfl: 3    tamsulosin (FLOMAX) 0.4 mg Cap, Take 2 capsules (0.8 mg total) by mouth once daily., Disp: 90 capsule, Rfl: 0           ROS  Twelve point system reviewed, unremarkable except for stated above in HPI.        Objective:         Vitals:    01/02/25 0934   BP: (!) 164/99   BP Location: Right arm   Patient Position: Sitting   Pulse: 74   Resp: 18   Temp: 97.5 °F (36.4 °C)   TempSrc: Temporal   SpO2: 99%   Weight: 118.4 kg (261 lb)   Height: 6' 4" (1.93 m)        Physical Exam     Patient is awake alert oriented person place and  Lungs are clear to auscultation bilaterally no crackles or wheezes   Cardiovascular S1-S2 regular rate and rhythm no murmurs rubs or gallops   Abdomen is soft positive bowel sounds nontender, extremities no clubbing cyanosis edema  Neuro no focal neurological deficits  Skin warm and dry.     Last Labs:     No visits with results within 1 Month(s) from this visit.   Latest known visit with results is:   Office Visit on 10/22/2024   Component Date Value    QRS Duration 10/22/2024 88     [facility] QTC Calculation 10/22/2024 433        Last Imaging:  X-Ray Knee 3 View Bilateral  Narrative: EXAMINATION:  XR KNEE 3 VIEW BILATERAL    CLINICAL HISTORY:  Pain in right knee    TECHNIQUE:  AP, lateral, and Merchant views of both knees were performed.    COMPARISON:  None    FINDINGS:  No fracture, dislocation, or joint effusion.  Both knees demonstrate moderate medial compartment joint space loss.  Impression: Moderate bilateral medial compartment joint space loss.    Electronically signed by: Tj Vee MD  Date:    01/08/2025  Time:    15:01         **Labs and x-rays personally reviewed by me    ** reviewed           Assessment & Plan:   Assessment & Plan    IMPRESSION:  - Identified Baker's cyst as the cause of patient's pain and discomfort  - Considered orthopedic intervention for Baker's cyst removal  - Assessed pain " management options  - Noted diffuse pain and swelling in multiple areas, including hand and ankles  - Acknowledged Dr. Houser's previous involvement in patient's care    AFFECTIVE DISORDER, REMISSION STATUS UNSPECIFIED:  - Continued current medication regimen, including treatments for blood pressure and allergies.    CYST:  - Evaluated the patient's Baker's cyst on the right knee causing pain and discomfort.  - Educated the patient about its presence and potential for ongoing pain if left untreated.  - Referred the patient to an orthopedic specialist for potential cyst removal.    MANAGEMENT:  - Prescribed a small quantity of narcotic medication along with continued use of ibuprofen for pain relief.    PAIN AND SWELLING:  - Noted the patient's report of diffuse pain and swelling in multiple areas, including ankles.    PROSTATIC HYPERPLASIA:  - Continued the patient's current Flomax medication for benign prostatic hyperplasia.           1. Pain in both knees, unspecified chronicity  -     Ambulatory referral/consult to Orthopedics; Future; Expected date: 01/09/2025  -     Ambulatory Referral/Consult to Physical Therapy; Future; Expected date: 01/09/2025  -     HYDROcodone-acetaminophen (NORCO) 7.5-325 mg per tablet; Take 1 tablet by mouth daily as needed for Pain. (Patient not taking: Reported on 1/7/2025)  Dispense: 8 tablet; Refill: 0    2. Primary hypertension  -     carvediloL (COREG) 6.25 MG tablet; Take 1 tablet (6.25 mg total) by mouth 2 (two) times daily with meals.  Dispense: 60 tablet; Refill: 0  -     HYDROcodone-acetaminophen (NORCO) 7.5-325 mg per tablet; Take 1 tablet by mouth daily as needed for Pain. (Patient not taking: Reported on 1/7/2025)  Dispense: 8 tablet; Refill: 0    Other orders  -     cariprazine (VRAYLAR) 3 mg Cap; Take 1 capsule (3 mg total) by mouth once daily.  Dispense: 30 capsule; Refill: 2  -     fluticasone propionate (FLONASE) 50 mcg/actuation nasal spray; 1 spray (50 mcg total)  by Each Nostril route once daily.  Dispense: 16 g; Refill: 2  -     gabapentin (NEURONTIN) 100 MG capsule; Take 1 capsule (100 mg total) by mouth every evening.  Dispense: 90 capsule; Refill: 0  -     naproxen (NAPROSYN) 500 MG tablet; Take 1 tablet (500 mg total) by mouth 2 (two) times daily with meals.  Dispense: 30 tablet; Refill: 0  -     rosuvastatin (CRESTOR) 40 MG Tab; Take 1 tablet (40 mg total) by mouth every evening.  Dispense: 90 tablet; Refill: 3  -     tamsulosin (FLOMAX) 0.4 mg Cap; Take 2 capsules (0.8 mg total) by mouth once daily.  Dispense: 90 capsule; Refill: 0  -     furosemide (LASIX) 20 MG tablet; Take 1 tablet (20 mg total) by mouth daily as needed (for swelling).  Dispense: 30 tablet; Refill: 0            Kali Yañez MD

## 2025-01-27 RX ORDER — FUROSEMIDE 20 MG/1
TABLET ORAL
Qty: 30 TABLET | Refills: 0 | Status: SHIPPED | OUTPATIENT
Start: 2025-01-27

## 2025-01-31 DIAGNOSIS — I10 PRIMARY HYPERTENSION: ICD-10-CM

## 2025-02-03 RX ORDER — CARVEDILOL 6.25 MG/1
6.25 TABLET ORAL 2 TIMES DAILY WITH MEALS
Qty: 60 TABLET | Refills: 0 | Status: SHIPPED | OUTPATIENT
Start: 2025-02-03 | End: 2025-03-04

## 2025-02-06 ENCOUNTER — TELEPHONE (OUTPATIENT)
Dept: FAMILY MEDICINE | Facility: CLINIC | Age: 60
End: 2025-02-06
Payer: MEDICARE

## 2025-02-06 ENCOUNTER — OFFICE VISIT (OUTPATIENT)
Dept: FAMILY MEDICINE | Facility: CLINIC | Age: 60
End: 2025-02-06
Payer: MEDICARE

## 2025-02-06 VITALS
HEIGHT: 76 IN | WEIGHT: 238 LBS | RESPIRATION RATE: 18 BRPM | DIASTOLIC BLOOD PRESSURE: 75 MMHG | BODY MASS INDEX: 28.98 KG/M2 | OXYGEN SATURATION: 97 % | SYSTOLIC BLOOD PRESSURE: 104 MMHG | TEMPERATURE: 98 F | HEART RATE: 90 BPM

## 2025-02-06 DIAGNOSIS — M25.561 ACUTE PAIN OF RIGHT KNEE: Primary | ICD-10-CM

## 2025-02-06 PROCEDURE — 3078F DIAST BP <80 MM HG: CPT | Mod: ,,, | Performed by: INTERNAL MEDICINE

## 2025-02-06 PROCEDURE — 3074F SYST BP LT 130 MM HG: CPT | Mod: ,,, | Performed by: INTERNAL MEDICINE

## 2025-02-06 PROCEDURE — 3008F BODY MASS INDEX DOCD: CPT | Mod: ,,, | Performed by: INTERNAL MEDICINE

## 2025-02-06 PROCEDURE — 99213 OFFICE O/P EST LOW 20 MIN: CPT | Mod: ,,, | Performed by: INTERNAL MEDICINE

## 2025-02-06 PROCEDURE — 1159F MED LIST DOCD IN RCRD: CPT | Mod: ,,, | Performed by: INTERNAL MEDICINE

## 2025-02-06 RX ORDER — HYDROCODONE BITARTRATE AND ACETAMINOPHEN 10; 325 MG/1; MG/1
1 TABLET ORAL DAILY PRN
Qty: 8 TABLET | Refills: 0 | Status: SHIPPED | OUTPATIENT
Start: 2025-02-06

## 2025-02-06 NOTE — TELEPHONE ENCOUNTER
----- Message from Kali Yañez MD sent at 2/6/2025  3:18 PM CST -----  Need to see in  2 week please  abnl results     1526 Pt is scheduled for 03/04/25

## 2025-02-09 NOTE — PROGRESS NOTES
Subjective:       Patient ID: Hola Burrell is a 60 y.o. male.    Chief Complaint: Knee Pain    History of Present Illness    CHIEF COMPLAINT:  Patient presents today with knee pain    HISTORY OF PRESENT ILLNESS:  He reports significant knee pain affecting mobility to the point where he can hardly walk and has concerns about losing balance. He has a history of Baker's cyst and continues to feel a knot in the posterior aspect of the knee. Due to progression of symptoms, he is seeking orthopedic surgical evaluation.         Current Medications:    Current Outpatient Medications:     cariprazine (VRAYLAR) 3 mg Cap, Take 1 capsule (3 mg total) by mouth once daily., Disp: 30 capsule, Rfl: 2    carvediloL (COREG) 6.25 MG tablet, TAKE 1 TABLET BY MOUTH TWICE DAILY WITH MEALS, Disp: 60 tablet, Rfl: 0    celecoxib (CELEBREX) 200 MG capsule, Take 200 mg by mouth daily as needed., Disp: , Rfl:     fluticasone propionate (FLONASE) 50 mcg/actuation nasal spray, 1 spray (50 mcg total) by Each Nostril route once daily., Disp: 16 g, Rfl: 2    furosemide (LASIX) 20 MG tablet, TAKE 1 TABLET BY MOUTH ONCE DAILY AS NEEDED FOR  SWELLING, Disp: 30 tablet, Rfl: 0    gabapentin (NEURONTIN) 100 MG capsule, Take 1 capsule (100 mg total) by mouth every evening., Disp: 90 capsule, Rfl: 0    naproxen (NAPROSYN) 500 MG tablet, Take 1 tablet (500 mg total) by mouth 2 (two) times daily with meals., Disp: 30 tablet, Rfl: 0    rosuvastatin (CRESTOR) 40 MG Tab, Take 1 tablet (40 mg total) by mouth every evening., Disp: 90 tablet, Rfl: 3    tamsulosin (FLOMAX) 0.4 mg Cap, Take 2 capsules (0.8 mg total) by mouth once daily., Disp: 90 capsule, Rfl: 0    zaleplon (SONATA) 10 MG capsule, Take 10 mg by mouth nightly as needed., Disp: , Rfl:     HYDROcodone-acetaminophen (NORCO)  mg per tablet, Take 1 tablet by mouth daily as needed for Pain., Disp: 8 tablet, Rfl: 0    HYDROcodone-acetaminophen (NORCO) 7.5-325 mg per tablet, Take 1 tablet by  "mouth daily as needed for Pain. (Patient not taking: Reported on 2/6/2025), Disp: 8 tablet, Rfl: 0    sildenafiL (VIAGRA) 100 MG tablet, Take 1 tablet (100 mg total) by mouth daily as needed for Erectile Dysfunction. (Patient not taking: Reported on 2/6/2025), Disp: 10 tablet, Rfl: 3           ROS  Twelve point system reviewed, unremarkable except for stated above in HPI.        Objective:         Vitals:    02/06/25 0844   BP: 104/75   BP Location: Right arm   Patient Position: Sitting   Pulse: 90   Resp: 18   Temp: 97.8 °F (36.6 °C)   TempSrc: Temporal   SpO2: 97%   Weight: 108 kg (238 lb)   Height: 6' 4" (1.93 m)        Physical Exam     Patient is awake alert oriented person place and  Lungs are clear to auscultation bilaterally no crackles or wheezes   Cardiovascular S1-S2 regular rate and rhythm no murmurs rubs or gallops   Abdomen is soft positive bowel sounds nontender, extremities no clubbing cyanosis edema  Neuro no focal neurological deficits  Skin warm and dry.     Last Labs:     Lab Visit on 01/07/2025   Component Date Value    Uric Acid 01/07/2025 6.2        Last Imaging:  X-Ray Knee 1 or 2 View Right  Narrative: EXAMINATION:  XR KNEE 1 OR 2 VIEW RIGHT    CLINICAL HISTORY:  Pain in right knee    COMPARISON:  None.    FINDINGS:  The alignment is within normal limits.  No displaced fractures identified.  No evidence of lytic or blastic lesions.Joint spaces are unremarkable.  While this may be projectional in nature, there appears to be irregularity of lateral tibial plateau.  Suprapatellar effusion.  Impression: No evidence of acute fracture.    Suspect age-indeterminate, likely chronic, irregularity of lateral tibial plateau may represent degenerative change or chronic post traumatic irregularity.  If indicated, CT or MRI could further evaluate.    Electronically signed by: Tj Tilley MD  Date:    02/06/2025  Time:    09:30         **Labs and x-rays personally reviewed by me    ** " reviewed           Assessment & Plan:   Assessment & Plan    IMPRESSION:  - Evaluated patient's knee pain, initially diagnosed as Baker's cyst  - Determined need for orthopedic intervention due to severity of symptoms affecting mobility and balance  - Will order knee x-ray for further assessment    METHAMPHETAMINE USE:  - Prescribed Narcos (narcotic pain medication) for the patient.    KNEE PAIN:  - Ordered knee x-ray to evaluate the patient's condition.  - Patient reports severe knee pain affecting mobility and balance.  - Referred the patient to an orthopedic surgeon for further evaluation and treatment.    BAKER'S CYST:  - Patient reports the presence of a knot, possibly related to a previously diagnosed Baker's cyst.    MEDICATIONS/SUPPLEMENTS:  - Patient confirms current use of pain medication.    FOLLOW UP:  - Instructed the patient to follow up after x-ray results are available.           1. Acute pain of right knee  -     X-Ray Knee 1 or 2 View Right; Future; Expected date: 02/06/2025  -     Ambulatory referral/consult to Orthopedics; Future; Expected date: 02/13/2025  -     HYDROcodone-acetaminophen (NORCO)  mg per tablet; Take 1 tablet by mouth daily as needed for Pain.  Dispense: 8 tablet; Refill: 0            Kali Yañez MD  This note was generated with the assistance of ambient listening technology. Verbal consent was obtained by the patient and accompanying visitor(s) for the recording of patient appointment to facilitate this note. I attest to having reviewed and edited the generated note for accuracy, though some syntax or spelling errors may persist. Please contact the author of this note for any clarification.

## 2025-02-20 RX ORDER — FUROSEMIDE 20 MG/1
TABLET ORAL
Qty: 30 TABLET | Refills: 0 | Status: SHIPPED | OUTPATIENT
Start: 2025-02-20

## 2025-02-25 ENCOUNTER — TELEPHONE (OUTPATIENT)
Dept: ORTHOPEDICS | Facility: CLINIC | Age: 60
End: 2025-02-25
Payer: MEDICARE

## 2025-02-25 DIAGNOSIS — M25.561 RIGHT KNEE PAIN, UNSPECIFIED CHRONICITY: Primary | ICD-10-CM

## 2025-03-01 DIAGNOSIS — I10 PRIMARY HYPERTENSION: ICD-10-CM

## 2025-03-04 ENCOUNTER — OFFICE VISIT (OUTPATIENT)
Dept: FAMILY MEDICINE | Facility: CLINIC | Age: 60
End: 2025-03-04
Payer: MEDICARE

## 2025-03-04 VITALS
SYSTOLIC BLOOD PRESSURE: 116 MMHG | DIASTOLIC BLOOD PRESSURE: 80 MMHG | RESPIRATION RATE: 18 BRPM | OXYGEN SATURATION: 96 % | HEART RATE: 101 BPM | WEIGHT: 245 LBS | BODY MASS INDEX: 29.83 KG/M2 | HEIGHT: 76 IN | TEMPERATURE: 97 F

## 2025-03-04 DIAGNOSIS — Z79.899 LONG TERM USE OF DRUG: ICD-10-CM

## 2025-03-04 DIAGNOSIS — I10 PRIMARY HYPERTENSION: ICD-10-CM

## 2025-03-04 DIAGNOSIS — M17.11 OSTEOARTHRITIS OF RIGHT KNEE, UNSPECIFIED OSTEOARTHRITIS TYPE: Primary | ICD-10-CM

## 2025-03-04 DIAGNOSIS — M25.561 ACUTE PAIN OF RIGHT KNEE: ICD-10-CM

## 2025-03-04 LAB
CTP QC/QA: YES
POC (AMP) AMPHETAMINE: ABNORMAL
POC (BAR) BARBITURATES: NEGATIVE
POC (BUP) BUPRENORPHINE: ABNORMAL
POC (BZO) BENZODIAZEPINES: NEGATIVE
POC (COC) COCAINE: NEGATIVE
POC (MDMA) METHYLENEDIOXYMETHAMPHETAMINE 3,4: NEGATIVE
POC (MET) METHAMPHETAMINE: ABNORMAL
POC (MOP) OPIATES: NEGATIVE
POC (MTD) METHADONE: NEGATIVE
POC (OXY) OXYCODONE: NEGATIVE
POC (PCP) PHENCYCLIDINE: NEGATIVE
POC (TCA) TRICYCLIC ANTIDEPRESSANTS: NEGATIVE
POC TEMPERATURE (URINE): 94
POC THC: ABNORMAL

## 2025-03-04 RX ORDER — HYDROCODONE BITARTRATE AND ACETAMINOPHEN 10; 325 MG/1; MG/1
1 TABLET ORAL DAILY PRN
Qty: 6 TABLET | Refills: 0 | Status: SHIPPED | OUTPATIENT
Start: 2025-03-04

## 2025-03-04 RX ORDER — KETOROLAC TROMETHAMINE 30 MG/ML
15 INJECTION, SOLUTION INTRAMUSCULAR; INTRAVENOUS
Status: COMPLETED | OUTPATIENT
Start: 2025-03-04 | End: 2025-03-04

## 2025-03-04 RX ORDER — CARVEDILOL 6.25 MG/1
6.25 TABLET ORAL 2 TIMES DAILY WITH MEALS
Qty: 60 TABLET | Refills: 0 | Status: SHIPPED | OUTPATIENT
Start: 2025-03-04

## 2025-03-04 RX ADMIN — KETOROLAC TROMETHAMINE 15 MG: 30 INJECTION, SOLUTION INTRAMUSCULAR; INTRAVENOUS at 11:03

## 2025-03-04 NOTE — PROGRESS NOTES
"Subjective:       Patient ID: Hola Burrell is a 60 y.o. male.    Chief Complaint: Knee Pain (Follow up )    History of Present Illness    CHIEF COMPLAINT:  Patient presents today for medication refills.    MUSCULOSKELETAL PAIN:  He reports bilateral wrist pain, neck pain, and bilateral knee pain, attributing these symptoms to the aging process. He compensates for knee pain by shifting weight to the contralateral side. He is scheduled for total knee replacement and is currently under care of [facility] for pain management.    SOCIAL HISTORY:  He reports occasional marijuana use.         Current Medications:  Current Medications[1]           ROS  Twelve point system reviewed, unremarkable except for stated above in HPI.        Objective:         Vitals:    03/04/25 1016   BP: 116/80   BP Location: Left arm   Patient Position: Sitting   Pulse: 101   Resp: 18   Temp: 97.3 °F (36.3 °C)   TempSrc: Temporal   SpO2: 96%   Weight: 111.1 kg (245 lb)   Height: 6' 4" (1.93 m)        Physical Exam     Patient is awake alert oriented person place and  Lungs are clear to auscultation bilaterally no crackles or wheezes   Cardiovascular S1-S2 regular rate and rhythm no murmurs rubs or gallops   Abdomen is soft positive bowel sounds nontender, extremities no clubbing cyanosis edema  Neuro no focal neurological deficits  Skin warm and dry.     Last Labs:     Office Visit on 03/04/2025   Component Date Value    POC Amphetamines 03/04/2025 Presumptive Positive (A)     POC Barbiturates 03/04/2025 Negative     POC Benzodiazepines 03/04/2025 Negative     POC Cocaine 03/04/2025 Negative     POC THC 03/04/2025 Presumptive Positive (A)     POC Methadone 03/04/2025 Negative     POC Methamphetamine 03/04/2025 Presumptive Positive (A)     POC Opiates 03/04/2025 Negative     POC Oxycodone 03/04/2025 Negative     POC Phencyclidine 03/04/2025 Negative     POC Methylenedioxymetham* 03/04/2025 Negative     POC Tricyclic Antidepres* " 03/04/2025 Negative      Acceptab* 03/04/2025 Yes     POC Temperature (Urine) 03/04/2025 94        Last Imaging:  X-Ray Knee 1 or 2 View Right  Narrative: EXAMINATION:  XR KNEE 1 OR 2 VIEW RIGHT    CLINICAL HISTORY:  Pain in right knee    COMPARISON:  None.    FINDINGS:  The alignment is within normal limits.  No displaced fractures identified.  No evidence of lytic or blastic lesions.Joint spaces are unremarkable.  While this may be projectional in nature, there appears to be irregularity of lateral tibial plateau.  Suprapatellar effusion.  Impression: No evidence of acute fracture.    Suspect age-indeterminate, likely chronic, irregularity of lateral tibial plateau may represent degenerative change or chronic post traumatic irregularity.  If indicated, CT or MRI could further evaluate.    Electronically signed by: Tj Tilley MD  Date:    02/06/2025  Time:    09:30         **Labs and x-rays personally reviewed by me    ** reviewed           Assessment & Plan:   Assessment & Plan    IMPRESSION:  - Assessed patient's pain complaints, including knee, wrists, neck, and other knee  - Considered upcoming total knee replacement and pending MRI results  - Evaluated pain management options  - Noted patient's existing relationship with [facility], potentially limiting narcotic prescribing    METHAMPHETAMINE USE:  - Assessed patient's substance use.  - Patient reports occasional cannabis use, but not in large quantities.    PAIN MANAGEMENT:  - Administered Toradol injection in the office for immediate pain relief.  - Refilled Narco (narcotic medication) with caution, considering the patient's contract with [facility].  - Advised the patient to follow up with Dr. Ramirez' pain management office, who will contact the patient directly.  - Patient reports pain in multiple areas including wrists, neck, and knees, attributing it to aging.  - Acknowledged the patient's pain and discussed pain  management strategies.  - Referred the patient to pain management for comprehensive care.    RIGHT WRIST PAIN:  - Patient reports experiencing pain in the right wrist.    LEFT WRIST PAIN:  - Patient reports experiencing pain in the left wrist.    NECK PAIN:  - Patient reports experiencing neck pain.    RIGHT KNEE PAIN:  - Patient reports pain in the right knee and mentions upcoming knee surgery.  - Ordered an MRI of the knee for further evaluation.  - Planned a total knee replacement procedure.    LEFT KNEE PAIN:  - Patient reports experiencing pain in the left knee.           1. Osteoarthritis of right knee, unspecified osteoarthritis type  -     ketorolac injection 15 mg  -     Ambulatory referral/consult to Pain Clinic; Future; Expected date: 03/11/2025    2. Long term use of drug  -     POCT Urine Drug Screen Presump    3. Acute pain of right knee  -     HYDROcodone-acetaminophen (NORCO)  mg per tablet; Take 1 tablet by mouth daily as needed for Pain.  Dispense: 6 tablet; Refill: 0    4. Primary hypertension  -     Hypertension Digital Medicine (HDMP) Enrollment Order            Kali Yañez MD  This note was generated with the assistance of ambient listening technology. Verbal consent was obtained by the patient and accompanying visitor(s) for the recording of patient appointment to facilitate this note. I attest to having reviewed and edited the generated note for accuracy, though some syntax or spelling errors may persist. Please contact the author of this note for any clarification.            [1]   Current Outpatient Medications:     cariprazine (VRAYLAR) 3 mg Cap, Take 1 capsule (3 mg total) by mouth once daily., Disp: 30 capsule, Rfl: 2    carvediloL (COREG) 6.25 MG tablet, TAKE 1 TABLET BY MOUTH TWICE DAILY WITH MEALS, Disp: 60 tablet, Rfl: 0    celecoxib (CELEBREX) 200 MG capsule, Take 200 mg by mouth daily as needed., Disp: , Rfl:     fluticasone propionate (FLONASE) 50 mcg/actuation nasal  spray, 1 spray (50 mcg total) by Each Nostril route once daily., Disp: 16 g, Rfl: 2    furosemide (LASIX) 20 MG tablet, TAKE 1 TABLET BY MOUTH ONCE DAILY AS NEEDED FOR  SWELLING, Disp: 30 tablet, Rfl: 0    gabapentin (NEURONTIN) 100 MG capsule, Take 1 capsule (100 mg total) by mouth every evening., Disp: 90 capsule, Rfl: 0    naproxen (NAPROSYN) 500 MG tablet, Take 1 tablet (500 mg total) by mouth 2 (two) times daily with meals., Disp: 30 tablet, Rfl: 0    rosuvastatin (CRESTOR) 40 MG Tab, Take 1 tablet (40 mg total) by mouth every evening., Disp: 90 tablet, Rfl: 3    tamsulosin (FLOMAX) 0.4 mg Cap, Take 2 capsules (0.8 mg total) by mouth once daily., Disp: 90 capsule, Rfl: 0    zaleplon (SONATA) 10 MG capsule, Take 10 mg by mouth nightly as needed., Disp: , Rfl:     HYDROcodone-acetaminophen (NORCO)  mg per tablet, Take 1 tablet by mouth daily as needed for Pain., Disp: 6 tablet, Rfl: 0    HYDROcodone-acetaminophen (NORCO) 7.5-325 mg per tablet, Take 1 tablet by mouth daily as needed for Pain. (Patient not taking: Reported on 1/7/2025), Disp: 8 tablet, Rfl: 0    sildenafiL (VIAGRA) 100 MG tablet, Take 1 tablet (100 mg total) by mouth daily as needed for Erectile Dysfunction. (Patient not taking: Reported on 1/7/2025), Disp: 10 tablet, Rfl: 3  No current facility-administered medications for this visit.

## 2025-03-07 ENCOUNTER — HOSPITAL ENCOUNTER (OUTPATIENT)
Dept: RADIOLOGY | Facility: HOSPITAL | Age: 60
Discharge: HOME | End: 2025-03-07
Attending: NURSE PRACTITIONER
Payer: MEDICARE

## 2025-03-07 DIAGNOSIS — M25.561 RIGHT KNEE PAIN, UNSPECIFIED CHRONICITY: ICD-10-CM

## 2025-03-07 PROCEDURE — 73721 MRI JNT OF LWR EXTRE W/O DYE: CPT | Mod: 26,RT,, | Performed by: RADIOLOGY

## 2025-03-07 PROCEDURE — 73721 MRI JNT OF LWR EXTRE W/O DYE: CPT | Mod: TC,RT

## 2025-03-13 ENCOUNTER — RESULTS FOLLOW-UP (OUTPATIENT)
Dept: ORTHOPEDICS | Facility: CLINIC | Age: 60
End: 2025-03-13

## 2025-03-13 ENCOUNTER — TELEPHONE (OUTPATIENT)
Dept: ORTHOPEDICS | Facility: CLINIC | Age: 60
End: 2025-03-13
Payer: MEDICARE

## 2025-03-17 RX ORDER — FUROSEMIDE 20 MG/1
TABLET ORAL
Qty: 30 TABLET | Refills: 0 | Status: SHIPPED | OUTPATIENT
Start: 2025-03-17

## 2025-03-24 RX ORDER — TAMSULOSIN HYDROCHLORIDE 0.4 MG/1
2 CAPSULE ORAL
Qty: 90 CAPSULE | Refills: 0 | Status: SHIPPED | OUTPATIENT
Start: 2025-03-24

## 2025-03-29 DIAGNOSIS — I10 PRIMARY HYPERTENSION: ICD-10-CM

## 2025-03-31 RX ORDER — CARVEDILOL 6.25 MG/1
6.25 TABLET ORAL 2 TIMES DAILY WITH MEALS
Qty: 60 TABLET | Refills: 0 | Status: SHIPPED | OUTPATIENT
Start: 2025-03-31

## 2025-04-02 ENCOUNTER — OFFICE VISIT (OUTPATIENT)
Dept: ORTHOPEDICS | Facility: CLINIC | Age: 60
End: 2025-04-02
Payer: MEDICARE

## 2025-04-02 VITALS
RESPIRATION RATE: 17 BRPM | OXYGEN SATURATION: 98 % | DIASTOLIC BLOOD PRESSURE: 69 MMHG | SYSTOLIC BLOOD PRESSURE: 97 MMHG | HEART RATE: 74 BPM

## 2025-04-02 DIAGNOSIS — S83.241A ACUTE MEDIAL MENISCUS TEAR, RIGHT, INITIAL ENCOUNTER: Primary | ICD-10-CM

## 2025-04-02 DIAGNOSIS — M19.90 INFLAMMATORY ARTHRITIS: ICD-10-CM

## 2025-04-02 DIAGNOSIS — M25.561 ACUTE PAIN OF RIGHT KNEE: ICD-10-CM

## 2025-04-02 DIAGNOSIS — M14.842: ICD-10-CM

## 2025-04-02 DIAGNOSIS — M45.7 ANKYLOSING SPONDYLITIS OF LUMBOSACRAL REGION: ICD-10-CM

## 2025-04-02 PROCEDURE — 99214 OFFICE O/P EST MOD 30 MIN: CPT | Mod: PBBFAC | Performed by: ORTHOPAEDIC SURGERY

## 2025-04-02 PROCEDURE — 99214 OFFICE O/P EST MOD 30 MIN: CPT | Mod: S$PBB,,, | Performed by: ORTHOPAEDIC SURGERY

## 2025-04-02 PROCEDURE — 3078F DIAST BP <80 MM HG: CPT | Mod: CPTII,,, | Performed by: ORTHOPAEDIC SURGERY

## 2025-04-02 PROCEDURE — 99999 PR PBB SHADOW E&M-EST. PATIENT-LVL IV: CPT | Mod: PBBFAC,,, | Performed by: ORTHOPAEDIC SURGERY

## 2025-04-02 PROCEDURE — 1159F MED LIST DOCD IN RCRD: CPT | Mod: CPTII,,, | Performed by: ORTHOPAEDIC SURGERY

## 2025-04-02 PROCEDURE — 3074F SYST BP LT 130 MM HG: CPT | Mod: CPTII,,, | Performed by: ORTHOPAEDIC SURGERY

## 2025-04-02 RX ORDER — NAPROXEN 500 MG/1
500 TABLET ORAL 2 TIMES DAILY
Qty: 60 TABLET | Refills: 1 | Status: SHIPPED | OUTPATIENT
Start: 2025-04-02

## 2025-04-02 NOTE — PROGRESS NOTES
Patient is here for right knee pain.  It is gentleman has had pain for several months.  He has an MRI that shows he has a tear of the medial meniscus a chronic partial ACL tear he also has a Baker cyst with a effusion.  In his gentleman has swelling over his hands in his well.  The MRI mentioned possible inflammatory arthritis I am going to get a rheumatoid panel.  He is having some swelling in his fingers.  It was far as his knees concerned he has a slight effusion.  He is tender over the posteromedial joint line.  Pain on David's testing.  I am not find any gross laxity on Lachman's or anterior drawer testing.  At this time in his x-rays show no fracture subluxation.  MRI shows the partial ACL with a medial meniscus tear and Baker cyst.  We discussed treatment options.  Risks and benefits surgery.  We will set him up for a right knee arthroscopy.  We will set this up in the near future.

## 2025-04-02 NOTE — PATIENT INSTRUCTIONS
Your surgery is scheduled at [facility] in [place] for 04/15/2025    Pre-Op Testin2025    ___x____ Lab (Clinic Lab 1st Floor)  _______ Chest X-ray ([facility] 1st Floor)  _______ EKG (Clinic 2nd Floor)    *[facility] will contact you the day before surgery to give you the arrival time.    *A  is required to provide transportation for you the day of surgery.    *Do NOT eat or drink anything after midnight the night before your surgery.    *Bring all medications in their original bottles.    *Bring anything you may need for an overnight stay.     *Bathe with Hibiclens the night or morning before surgery.    *The morning of your surgery ONLY take blood pressure medications, heart medications, medications for acid reflux, and thyroid medications (the morning dose only).  *Take these medications with a sip of water.    *Do not take Insulin or Diabetic Medications the night before or the morning of your surgery, unless directed otherwise.    *Be sure that you have stopped blood thinners at the appropriate time, as instructed.  (_____ days prior to surgery)    *Bring your C-Pap machine if you have one.    *All jewelry and piercings MUST be removed prior to surgery.    *False eye lashes must be removed prior to surgery.    *Any questions regarding co-pays or deductibles with insurance, please contact the [facility] at #[phone].    *For Financial Assistance you may call #[phone] or #[phone].    Thank you for choosing Dr. Germán Solitario for your Orthopedic needs.  We look forward to caring for you. If you have any questions, please contact our office at [phone].

## 2025-04-05 ENCOUNTER — RESULTS FOLLOW-UP (OUTPATIENT)
Dept: ORTHOPEDICS | Facility: HOSPITAL | Age: 60
End: 2025-04-05
Payer: MEDICARE

## 2025-04-05 DIAGNOSIS — M19.90 INFLAMMATORY ARTHRITIS: Primary | ICD-10-CM

## 2025-04-05 NOTE — LETTER
Fax Transmission                                                                                                                                                       Date: April 15, 2025       To:  [facility] From: Blanche @ Dr. Solitario's office   Fax:  [phone] Fax: [phone]   Phone:  [phone] Phone: [phone]     Special Instructions: Need to refer patient Hola Burrell : 1965                            CONFIDENTIALITY NOTICE:  The documents accompanying this telecopy transmission contain confidential information belonging to the sender. The information is intended only for the use of the individual or entity named above. If you are not the intended recipient you are hereby notified that any disclosure, copying, distribution or taking of any action in reliance on the contents of this telecopied information is strictly prohibited. If you have received this telecopy in error, please immediately notify this office by telephone at [phone].

## 2025-04-07 ENCOUNTER — TELEPHONE (OUTPATIENT)
Dept: ORTHOPEDICS | Facility: CLINIC | Age: 60
End: 2025-04-07
Payer: MEDICARE

## 2025-04-07 NOTE — TELEPHONE ENCOUNTER
----- Message from Kathy sent at 4/7/2025  3:39 PM CDT -----  Regarding: peer to peer  This will need PT submitted by 4/16/25 or a peer to peer set up at [phone] please reference tracking [id number] when setting up peer to peer otherwise without PT this will deny

## 2025-04-09 ENCOUNTER — TELEPHONE (OUTPATIENT)
Dept: ORTHOPEDICS | Facility: CLINIC | Age: 60
End: 2025-04-09
Payer: MEDICARE

## 2025-04-09 ENCOUNTER — OFFICE VISIT (OUTPATIENT)
Dept: FAMILY MEDICINE | Facility: CLINIC | Age: 60
End: 2025-04-09
Payer: MEDICARE

## 2025-04-09 VITALS
HEIGHT: 76 IN | WEIGHT: 229 LBS | SYSTOLIC BLOOD PRESSURE: 127 MMHG | BODY MASS INDEX: 27.89 KG/M2 | HEART RATE: 97 BPM | DIASTOLIC BLOOD PRESSURE: 85 MMHG | RESPIRATION RATE: 18 BRPM | TEMPERATURE: 97 F | OXYGEN SATURATION: 100 %

## 2025-04-09 DIAGNOSIS — R60.9 EDEMA, UNSPECIFIED TYPE: Primary | ICD-10-CM

## 2025-04-09 DIAGNOSIS — E87.6 HYPOKALEMIA: ICD-10-CM

## 2025-04-09 DIAGNOSIS — M06.9 RHEUMATOID ARTHRITIS, INVOLVING UNSPECIFIED SITE, UNSPECIFIED WHETHER RHEUMATOID FACTOR PRESENT: ICD-10-CM

## 2025-04-09 PROCEDURE — 1159F MED LIST DOCD IN RCRD: CPT | Mod: ,,, | Performed by: INTERNAL MEDICINE

## 2025-04-09 PROCEDURE — 3079F DIAST BP 80-89 MM HG: CPT | Mod: ,,, | Performed by: INTERNAL MEDICINE

## 2025-04-09 PROCEDURE — 3074F SYST BP LT 130 MM HG: CPT | Mod: ,,, | Performed by: INTERNAL MEDICINE

## 2025-04-09 PROCEDURE — 99214 OFFICE O/P EST MOD 30 MIN: CPT | Mod: ,,, | Performed by: INTERNAL MEDICINE

## 2025-04-09 PROCEDURE — 3008F BODY MASS INDEX DOCD: CPT | Mod: ,,, | Performed by: INTERNAL MEDICINE

## 2025-04-09 RX ORDER — NAPROXEN 500 MG/1
500 TABLET ORAL 2 TIMES DAILY
Qty: 30 TABLET | Refills: 1 | Status: SHIPPED | OUTPATIENT
Start: 2025-04-09

## 2025-04-09 RX ORDER — FUROSEMIDE 40 MG/1
40 TABLET ORAL DAILY
Qty: 90 TABLET | Refills: 1 | Status: SHIPPED | OUTPATIENT
Start: 2025-04-09

## 2025-04-09 RX ORDER — POTASSIUM CHLORIDE 750 MG/1
20 TABLET, EXTENDED RELEASE ORAL DAILY
Qty: 90 TABLET | Refills: 1 | Status: SHIPPED | OUTPATIENT
Start: 2025-04-09

## 2025-04-09 RX ORDER — FUROSEMIDE 20 MG/1
20 TABLET ORAL DAILY PRN
Qty: 30 TABLET | Refills: 0 | Status: CANCELLED | OUTPATIENT
Start: 2025-04-09

## 2025-04-09 NOTE — TELEPHONE ENCOUNTER
----- Message from Priyanka sent at 4/9/2025  9:57 AM CDT -----  Regarding: talk to a nurse  Who Called: Hola Collins is requesting assistance/information from provider's office.Symptoms (please be specific): needs to talk to a nurse about surgery and labs  Preferred Method of Contact: Phone CallPatient's Preferred Phone Number on File: [phone] Best Call Back Number, if different:Additional Information:

## 2025-04-09 NOTE — PROGRESS NOTES
"Subjective:       Patient ID: Hola Burrell is a 60 y.o. male.    Chief Complaint: Rheumatoid Arthritis and Health Maintenance (Hemoglobin A1c (Diabetic Prevention Screening) due on 04/06/2025-ordered today /)    History of Present Illness            Patient presents with a history of rheumatoid arthritis complains of intermittent swelling in his extremities and also has had hypokalemia does not endorse any fever chills chest pain or shortness for breath today.  No nausea vomiting diarrhea or constipation.  Current Medications:  Current Medications[1]           ROS  Twelve point system reviewed, unremarkable except for stated above in HPI.        Objective:         Vitals:    04/09/25 1339   BP: 127/85   BP Location: Left arm   Patient Position: Sitting   Pulse: 97   Resp: 18   Temp: 97.2 °F (36.2 °C)   TempSrc: Temporal   SpO2: 100%   Weight: 103.9 kg (229 lb)   Height: 6' 4" (1.93 m)        Physical Exam     Patient is awake alert oriented person place and  Lungs are clear to auscultation bilaterally no crackles or wheezes   Cardiovascular S1-S2 regular rate and rhythm no murmurs rubs or gallops   Abdomen is soft positive bowel sounds nontender, extremities no clubbing cyanosis edema  Neuro no focal neurological deficits  Skin warm and dry.     Last Labs:     Lab Visit on 04/02/2025   Component Date Value    CCP 04/02/2025 >250.0 (H)        Last Imaging:  MRI Knee Without Contrast Right  Narrative: EXAMINATION:  MRI KNEE WITHOUT CONTRAST RIGHT    CLINICAL HISTORY:  right knee pain;Pain in right knee    TECHNIQUE:  Multiplanar, multisequence images were performed about the knee.    COMPARISON:  Radiograph 02/06/2025    FINDINGS:  Menisci:  Complete degeneration or complex tear of the body and posterior horn of the medial meniscus with anterior horn extrusion and degeneration.  The lateral meniscus is diminutive in caliber, intact.    Ligaments:  High-grade chronic partial tear of the ACL versus mucoid " degeneration, the PCL is intact.  The MCL is intact.  Popliteal muscle strain, likely reactive.    Tendons:  Extensor mechanism is maintained.    Cartilage:    Patellofemoral: Articular cartilage is maintained.    Medial tibiofemoral: Moderate diffuse cartilage loss at the medial knee compartment.  No subchondral edema.    Lateral tibiofemoral: Moderate to severe diffuse cartilage loss at the lateral knee compartment.    Bone: Subtle reactive bone marrow edema at the medial knee compartment.  No fracture, no osseous lesions, no osseous erosions.    Miscellaneous: Large joint effusion with synovitis.  Large popliteal cyst 4.4 x 1.7 x 8.5 cm with synovitis.  Cyst or distended bursa underneath the pes anserinus measuring 2.2 x 1.1 x 4.9 cm.  Impression: Large joint effusion with significant synovitis along with a large Baker's cyst with recent partial rupture.  Although this could be chronic degeneration, it is unusual at this age, underlying inflammatory arthritis could have this appearance.    Diffuse cartilage thinning at the medial and lateral knee compartment.    The ACL is ill define concerning for chronic partial tear or mucoid degeneration.    Electronically signed by: Molly De Anda MD  Date:    03/11/2025  Time:    18:08         **Labs and x-rays personally reviewed by me    ** reviewed           Assessment & Plan:   Assessment & Plan                1. Edema, unspecified type  -     furosemide (LASIX) 40 MG tablet; Take 1 tablet (40 mg total) by mouth once daily.  Dispense: 90 tablet; Refill: 1    2. Rheumatoid arthritis, involving unspecified site, unspecified whether rheumatoid factor present  -     naproxen (NAPROSYN) 500 MG tablet; Take 1 tablet (500 mg total) by mouth 2 (two) times daily.  Dispense: 30 tablet; Refill: 1    3. Hypokalemia  -     potassium chloride SA (K-DUR,KLOR-CON M) 10 MEQ tablet; Take 2 tablets (20 mEq total) by mouth once daily.  Dispense: 90 tablet; Refill:  1            Kali Yañez MD  This note was generated with the assistance of ambient listening technology. Verbal consent was obtained by the patient and accompanying visitor(s) for the recording of patient appointment to facilitate this note. I attest to having reviewed and edited the generated note for accuracy, though some syntax or spelling errors may persist. Please contact the author of this note for any clarification.            [1]   Current Outpatient Medications:     cariprazine (VRAYLAR) 3 mg Cap, Take 1 capsule (3 mg total) by mouth once daily., Disp: 30 capsule, Rfl: 2    carvediloL (COREG) 6.25 MG tablet, TAKE 1 TABLET BY MOUTH TWICE DAILY WITH MEALS, Disp: 60 tablet, Rfl: 0    celecoxib (CELEBREX) 200 MG capsule, Take 200 mg by mouth daily as needed., Disp: , Rfl:     fluticasone propionate (FLONASE) 50 mcg/actuation nasal spray, 1 spray (50 mcg total) by Each Nostril route once daily., Disp: 16 g, Rfl: 2    gabapentin (NEURONTIN) 100 MG capsule, Take 1 capsule (100 mg total) by mouth every evening., Disp: 90 capsule, Rfl: 0    HYDROcodone-acetaminophen (NORCO)  mg per tablet, Take 1 tablet by mouth daily as needed for Pain., Disp: 6 tablet, Rfl: 0    naproxen (NAPROSYN) 500 MG tablet, Take 1 tablet (500 mg total) by mouth 2 (two) times daily with meals., Disp: 30 tablet, Rfl: 0    rosuvastatin (CRESTOR) 40 MG Tab, Take 1 tablet (40 mg total) by mouth every evening., Disp: 90 tablet, Rfl: 3    tamsulosin (FLOMAX) 0.4 mg Cap, Take 2 capsules by mouth once daily, Disp: 90 capsule, Rfl: 0    zaleplon (SONATA) 10 MG capsule, Take 10 mg by mouth nightly as needed., Disp: , Rfl:     furosemide (LASIX) 40 MG tablet, Take 1 tablet (40 mg total) by mouth once daily., Disp: 90 tablet, Rfl: 1    HYDROcodone-acetaminophen (NORCO) 7.5-325 mg per tablet, Take 1 tablet by mouth daily as needed for Pain. (Patient not taking: Reported on 1/7/2025), Disp: 8 tablet, Rfl: 0    naproxen (NAPROSYN) 500 MG  tablet, Take 1 tablet (500 mg total) by mouth 2 (two) times daily., Disp: 30 tablet, Rfl: 1    potassium chloride SA (K-DUR,KLOR-CON M) 10 MEQ tablet, Take 2 tablets (20 mEq total) by mouth once daily., Disp: 90 tablet, Rfl: 1    sildenafiL (VIAGRA) 100 MG tablet, Take 1 tablet (100 mg total) by mouth daily as needed for Erectile Dysfunction. (Patient not taking: Reported on 1/7/2025), Disp: 10 tablet, Rfl: 3

## 2025-04-11 NOTE — TELEPHONE ENCOUNTER
Spoke with Haleigh at [facility] to schedule the Peer to Peer for Dr. Solitario. They were unable to give me a time and stated they would have to call our office back with a time.  I advised her that I would be out of the office at 11:30a.m. today  to attend a meeting and to leave the scheduled time with our Contact Center.

## 2025-04-11 NOTE — TELEPHONE ENCOUNTER
Advised him that Dr. Solitario reviewed his labs and wants him referred to a rheumatologist. Patient agrees.

## 2025-04-14 ENCOUNTER — TELEPHONE (OUTPATIENT)
Dept: ORTHOPEDICS | Facility: CLINIC | Age: 60
End: 2025-04-14
Payer: MEDICARE

## 2025-05-01 ENCOUNTER — ANESTHESIA EVENT (OUTPATIENT)
Dept: SURGERY | Facility: HOSPITAL | Age: 60
End: 2025-05-01
Payer: MEDICARE

## 2025-05-01 ENCOUNTER — HOSPITAL ENCOUNTER (OUTPATIENT)
Facility: HOSPITAL | Age: 60
Discharge: HOME | End: 2025-05-01
Attending: ORTHOPAEDIC SURGERY | Admitting: ORTHOPAEDIC SURGERY
Payer: MEDICARE

## 2025-05-01 ENCOUNTER — ANESTHESIA (OUTPATIENT)
Dept: SURGERY | Facility: HOSPITAL | Age: 60
End: 2025-05-01
Payer: MEDICARE

## 2025-05-01 VITALS
RESPIRATION RATE: 18 BRPM | SYSTOLIC BLOOD PRESSURE: 108 MMHG | TEMPERATURE: 98 F | WEIGHT: 250 LBS | HEART RATE: 82 BPM | OXYGEN SATURATION: 100 % | DIASTOLIC BLOOD PRESSURE: 74 MMHG | HEIGHT: 76 IN | BODY MASS INDEX: 30.44 KG/M2

## 2025-05-01 DIAGNOSIS — S83.241A ACUTE MEDIAL MENISCUS TEAR, RIGHT, INITIAL ENCOUNTER: ICD-10-CM

## 2025-05-01 LAB
ANION GAP SERPL CALCULATED.3IONS-SCNC: 12 MMOL/L (ref 7–16)
BASOPHILS # BLD AUTO: 0.05 K/UL (ref 0–0.2)
BASOPHILS NFR BLD AUTO: 0.4 % (ref 0–1)
BUN SERPL-MCNC: 12 MG/DL (ref 8–26)
BUN/CREAT SERPL: 12 (ref 6–20)
CALCIUM SERPL-MCNC: 8.9 MG/DL (ref 8.4–10.2)
CHLORIDE SERPL-SCNC: 104 MMOL/L (ref 98–107)
CO2 SERPL-SCNC: 24 MMOL/L (ref 22–29)
CREAT SERPL-MCNC: 1.04 MG/DL (ref 0.72–1.25)
DIFFERENTIAL METHOD BLD: ABNORMAL
EGFR (NO RACE VARIABLE) (RUSH/TITUS): 83 ML/MIN/1.73M2
EOSINOPHIL # BLD AUTO: 0.07 K/UL (ref 0–0.5)
EOSINOPHIL NFR BLD AUTO: 0.5 % (ref 1–4)
ERYTHROCYTE [DISTWIDTH] IN BLOOD BY AUTOMATED COUNT: 13.9 % (ref 11.5–14.5)
GLUCOSE SERPL-MCNC: 105 MG/DL (ref 74–100)
HCT VFR BLD AUTO: 36 % (ref 40–54)
HGB BLD-MCNC: 11.6 G/DL (ref 13.5–18)
IMM GRANULOCYTES # BLD AUTO: 0.09 K/UL (ref 0–0.04)
IMM GRANULOCYTES NFR BLD: 0.7 % (ref 0–0.4)
LYMPHOCYTES # BLD AUTO: 1.06 K/UL (ref 1–4.8)
LYMPHOCYTES NFR BLD AUTO: 8.2 % (ref 27–41)
MCH RBC QN AUTO: 28.2 PG (ref 27–31)
MCHC RBC AUTO-ENTMCNC: 32.2 G/DL (ref 32–36)
MCV RBC AUTO: 87.6 FL (ref 80–96)
MONOCYTES # BLD AUTO: 0.93 K/UL (ref 0–0.8)
MONOCYTES NFR BLD AUTO: 7.2 % (ref 2–6)
MPC BLD CALC-MCNC: 8.5 FL (ref 9.4–12.4)
NEUTROPHILS # BLD AUTO: 10.68 K/UL (ref 1.8–7.7)
NEUTROPHILS NFR BLD AUTO: 83 % (ref 53–65)
NRBC # BLD AUTO: 0 X10E3/UL
NRBC, AUTO (.00): 0 %
PLATELET # BLD AUTO: 246 K/UL (ref 150–400)
POTASSIUM SERPL-SCNC: 4.1 MMOL/L (ref 3.5–5.1)
RBC # BLD AUTO: 4.11 M/UL (ref 4.6–6.2)
SODIUM SERPL-SCNC: 136 MMOL/L (ref 136–145)
WBC # BLD AUTO: 12.88 K/UL (ref 4.5–11)

## 2025-05-01 PROCEDURE — 71000033 HC RECOVERY, INTIAL HOUR: Performed by: ORTHOPAEDIC SURGERY

## 2025-05-01 PROCEDURE — 63600175 PHARM REV CODE 636 W HCPCS: Performed by: ORTHOPAEDIC SURGERY

## 2025-05-01 PROCEDURE — 63600175 PHARM REV CODE 636 W HCPCS: Performed by: NURSE ANESTHETIST, CERTIFIED REGISTERED

## 2025-05-01 PROCEDURE — 71000016 HC POSTOP RECOV ADDL HR: Performed by: ORTHOPAEDIC SURGERY

## 2025-05-01 PROCEDURE — 85025 COMPLETE CBC W/AUTO DIFF WBC: CPT | Performed by: ORTHOPAEDIC SURGERY

## 2025-05-01 PROCEDURE — 37000008 HC ANESTHESIA 1ST 15 MINUTES: Performed by: ORTHOPAEDIC SURGERY

## 2025-05-01 PROCEDURE — 71000015 HC POSTOP RECOV 1ST HR: Performed by: ORTHOPAEDIC SURGERY

## 2025-05-01 PROCEDURE — 80048 BASIC METABOLIC PNL TOTAL CA: CPT | Performed by: ORTHOPAEDIC SURGERY

## 2025-05-01 PROCEDURE — 29880 ARTHRS KNE SRG MNISECTMY M&L: CPT | Mod: RT,,, | Performed by: ORTHOPAEDIC SURGERY

## 2025-05-01 PROCEDURE — 36000711: Performed by: ORTHOPAEDIC SURGERY

## 2025-05-01 PROCEDURE — 25000003 PHARM REV CODE 250: Performed by: ORTHOPAEDIC SURGERY

## 2025-05-01 PROCEDURE — 36000710: Performed by: ORTHOPAEDIC SURGERY

## 2025-05-01 PROCEDURE — 37000009 HC ANESTHESIA EA ADD 15 MINS: Performed by: ORTHOPAEDIC SURGERY

## 2025-05-01 PROCEDURE — 36415 COLL VENOUS BLD VENIPUNCTURE: CPT | Performed by: ORTHOPAEDIC SURGERY

## 2025-05-01 PROCEDURE — 97161 PT EVAL LOW COMPLEX 20 MIN: CPT

## 2025-05-01 PROCEDURE — 27201423 OPTIME MED/SURG SUP & DEVICES STERILE SUPPLY: Performed by: ORTHOPAEDIC SURGERY

## 2025-05-01 RX ORDER — ONDANSETRON 4 MG/1
8 TABLET, ORALLY DISINTEGRATING ORAL EVERY 8 HOURS PRN
Status: DISCONTINUED | OUTPATIENT
Start: 2025-05-01 | End: 2025-05-01 | Stop reason: HOSPADM

## 2025-05-01 RX ORDER — PROMETHAZINE HYDROCHLORIDE 25 MG/1
25 TABLET ORAL EVERY 6 HOURS PRN
Status: DISCONTINUED | OUTPATIENT
Start: 2025-05-01 | End: 2025-05-01 | Stop reason: HOSPADM

## 2025-05-01 RX ORDER — ACETAMINOPHEN 500 MG
1000 TABLET ORAL EVERY 6 HOURS PRN
Status: DISCONTINUED | OUTPATIENT
Start: 2025-05-01 | End: 2025-05-01 | Stop reason: HOSPADM

## 2025-05-01 RX ORDER — LIDOCAINE HYDROCHLORIDE 20 MG/ML
INJECTION, SOLUTION EPIDURAL; INFILTRATION; INTRACAUDAL; PERINEURAL
Status: DISCONTINUED | OUTPATIENT
Start: 2025-05-01 | End: 2025-05-01

## 2025-05-01 RX ORDER — SODIUM CHLORIDE, SODIUM LACTATE, POTASSIUM CHLORIDE, CALCIUM CHLORIDE 600; 310; 30; 20 MG/100ML; MG/100ML; MG/100ML; MG/100ML
125 INJECTION, SOLUTION INTRAVENOUS CONTINUOUS
Status: DISCONTINUED | OUTPATIENT
Start: 2025-05-01 | End: 2025-05-01 | Stop reason: HOSPADM

## 2025-05-01 RX ORDER — PROPOFOL 10 MG/ML
VIAL (ML) INTRAVENOUS
Status: DISCONTINUED | OUTPATIENT
Start: 2025-05-01 | End: 2025-05-01

## 2025-05-01 RX ORDER — ONDANSETRON HYDROCHLORIDE 2 MG/ML
4 INJECTION, SOLUTION INTRAVENOUS DAILY PRN
Status: DISCONTINUED | OUTPATIENT
Start: 2025-05-01 | End: 2025-05-01 | Stop reason: HOSPADM

## 2025-05-01 RX ORDER — OXYCODONE AND ACETAMINOPHEN 10; 325 MG/1; MG/1
1 TABLET ORAL EVERY 6 HOURS PRN
Qty: 28 TABLET | Refills: 0 | Status: SHIPPED | OUTPATIENT
Start: 2025-05-01

## 2025-05-01 RX ORDER — HYDROCODONE BITARTRATE AND ACETAMINOPHEN 5; 325 MG/1; MG/1
1 TABLET ORAL EVERY 4 HOURS PRN
Status: DISCONTINUED | OUTPATIENT
Start: 2025-05-01 | End: 2025-05-01 | Stop reason: HOSPADM

## 2025-05-01 RX ORDER — EPINEPHRINE 1 MG/ML
INJECTION INTRAMUSCULAR; INTRAVENOUS; SUBCUTANEOUS
Status: DISCONTINUED | OUTPATIENT
Start: 2025-05-01 | End: 2025-05-01 | Stop reason: HOSPADM

## 2025-05-01 RX ORDER — HYDROCODONE BITARTRATE AND ACETAMINOPHEN 10; 325 MG/1; MG/1
1 TABLET ORAL EVERY 4 HOURS PRN
Status: DISCONTINUED | OUTPATIENT
Start: 2025-05-01 | End: 2025-05-01 | Stop reason: HOSPADM

## 2025-05-01 RX ORDER — MIDAZOLAM HYDROCHLORIDE 1 MG/ML
INJECTION INTRAMUSCULAR; INTRAVENOUS
Status: DISCONTINUED | OUTPATIENT
Start: 2025-05-01 | End: 2025-05-01

## 2025-05-01 RX ORDER — SODIUM CHLORIDE 9 MG/ML
INJECTION, SOLUTION INTRAVENOUS CONTINUOUS
Status: DISCONTINUED | OUTPATIENT
Start: 2025-05-01 | End: 2025-05-01 | Stop reason: HOSPADM

## 2025-05-01 RX ORDER — ONDANSETRON HYDROCHLORIDE 2 MG/ML
INJECTION, SOLUTION INTRAVENOUS
Status: DISCONTINUED | OUTPATIENT
Start: 2025-05-01 | End: 2025-05-01

## 2025-05-01 RX ORDER — CEFAZOLIN 2 G/1
2 INJECTION, POWDER, FOR SOLUTION INTRAMUSCULAR; INTRAVENOUS
Status: DISCONTINUED | OUTPATIENT
Start: 2025-05-01 | End: 2025-05-01 | Stop reason: HOSPADM

## 2025-05-01 RX ORDER — CEFAZOLIN SODIUM 1 G/3ML
INJECTION, POWDER, FOR SOLUTION INTRAMUSCULAR; INTRAVENOUS
Status: DISCONTINUED | OUTPATIENT
Start: 2025-05-01 | End: 2025-05-01

## 2025-05-01 RX ORDER — FENTANYL CITRATE 50 UG/ML
INJECTION, SOLUTION INTRAMUSCULAR; INTRAVENOUS
Status: DISCONTINUED | OUTPATIENT
Start: 2025-05-01 | End: 2025-05-01

## 2025-05-01 RX ORDER — SODIUM CHLORIDE 0.9 G/100ML
INJECTION, SOLUTION IRRIGATION
Status: DISCONTINUED | OUTPATIENT
Start: 2025-05-01 | End: 2025-05-01 | Stop reason: HOSPADM

## 2025-05-01 RX ORDER — MEPERIDINE HYDROCHLORIDE 25 MG/ML
25 INJECTION INTRAMUSCULAR; INTRAVENOUS; SUBCUTANEOUS EVERY 10 MIN PRN
Status: DISCONTINUED | OUTPATIENT
Start: 2025-05-01 | End: 2025-05-01 | Stop reason: HOSPADM

## 2025-05-01 RX ORDER — MORPHINE SULFATE 10 MG/ML
4 INJECTION INTRAMUSCULAR; INTRAVENOUS; SUBCUTANEOUS EVERY 5 MIN PRN
Status: DISCONTINUED | OUTPATIENT
Start: 2025-05-01 | End: 2025-05-01 | Stop reason: HOSPADM

## 2025-05-01 RX ORDER — DIPHENHYDRAMINE HYDROCHLORIDE 50 MG/ML
25 INJECTION, SOLUTION INTRAMUSCULAR; INTRAVENOUS EVERY 6 HOURS PRN
Status: DISCONTINUED | OUTPATIENT
Start: 2025-05-01 | End: 2025-05-01 | Stop reason: HOSPADM

## 2025-05-01 RX ORDER — HYDROMORPHONE HYDROCHLORIDE 2 MG/ML
0.5 INJECTION, SOLUTION INTRAMUSCULAR; INTRAVENOUS; SUBCUTANEOUS EVERY 5 MIN PRN
Status: DISCONTINUED | OUTPATIENT
Start: 2025-05-01 | End: 2025-05-01 | Stop reason: HOSPADM

## 2025-05-01 RX ORDER — OXYCODONE HYDROCHLORIDE 5 MG/1
5 TABLET ORAL
Status: DISCONTINUED | OUTPATIENT
Start: 2025-05-01 | End: 2025-05-01 | Stop reason: HOSPADM

## 2025-05-01 RX ORDER — IPRATROPIUM BROMIDE AND ALBUTEROL SULFATE 2.5; .5 MG/3ML; MG/3ML
3 SOLUTION RESPIRATORY (INHALATION)
Status: DISCONTINUED | OUTPATIENT
Start: 2025-05-01 | End: 2025-05-01 | Stop reason: HOSPADM

## 2025-05-01 RX ADMIN — PROPOFOL 150 MG: 10 INJECTION, EMULSION INTRAVENOUS at 11:05

## 2025-05-01 RX ADMIN — LIDOCAINE HYDROCHLORIDE 100 MG: 20 INJECTION, SOLUTION INTRAVENOUS at 11:05

## 2025-05-01 RX ADMIN — CEFAZOLIN 2 G: 1 INJECTION, POWDER, FOR SOLUTION INTRAMUSCULAR; INTRAVENOUS; PARENTERAL at 11:05

## 2025-05-01 RX ADMIN — SODIUM CHLORIDE: 9 INJECTION, SOLUTION INTRAVENOUS at 09:05

## 2025-05-01 RX ADMIN — ONDANSETRON 4 MG: 2 INJECTION INTRAMUSCULAR; INTRAVENOUS at 11:05

## 2025-05-01 RX ADMIN — MIDAZOLAM HYDROCHLORIDE 2 MG: 1 INJECTION, SOLUTION INTRAMUSCULAR; INTRAVENOUS at 11:05

## 2025-05-01 RX ADMIN — FENTANYL CITRATE 100 MCG: 50 INJECTION, SOLUTION INTRAMUSCULAR; INTRAVENOUS at 11:05

## 2025-05-01 NOTE — ANESTHESIA POSTPROCEDURE EVALUATION
Anesthesia Post Evaluation    Patient: Hola Burrell    Procedure(s) Performed: Procedure(s) (LRB):  ARTHROSCOPY, KNEE, WITH MEDIAL OR LATERAL MENISCECTOMY (Right)    Final Anesthesia Type: general      Patient location during evaluation: PACU  Patient participation: Yes- Able to Participate  Level of consciousness: awake and sedated  Post-procedure vital signs: reviewed and stable  Pain management: adequate  Airway patency: patent    PONV status at discharge: No PONV  Anesthetic complications: no      Cardiovascular status: blood pressure returned to baseline  Respiratory status: unassisted  Hydration status: euvolemic  Follow-up not needed.              Vitals Value Taken Time   /68 05/01/25 12:25   Temp 36.7 °C (98 °F) 05/01/25 12:25   Pulse 85 05/01/25 12:25   Resp 25 05/01/25 12:25   SpO2 93 % 05/01/25 12:25         No case tracking events are documented in the log.      Pain/Wilmer Score: No data recorded

## 2025-05-01 NOTE — ANESTHESIA PREPROCEDURE EVALUATION
05/01/2025  Hola Burrell is a 60 y.o., male.      Pre-op Assessment    I have reviewed the Patient Summary Reports.     I have reviewed the Nursing Notes. I have reviewed the NPO Status.   I have reviewed the Medications.     Review of Systems  Anesthesia Hx:  No problems with previous Anesthesia                Social:  No Alcohol Use, Smoker Meth use      Hematology/Oncology:  Hematology Normal                       --  Cancer in past history:                     EENT/Dental:  EENT/Dental Normal           Cardiovascular:     Hypertension          PVD hyperlipidemia                               Pulmonary:    Asthma  Shortness of breath                  Renal/:  Renal/ Normal    Prostate cancer             Hepatic/GI:  Hepatic/GI Normal                    Musculoskeletal:  Arthritis               Neurological:  Neurology Normal                                      Endocrine:  Endocrine Normal            Dermatological:  Skin Normal    Psych:  Psychiatric Normal     bipolar               Physical Exam  General: Well nourished    Airway:  Mallampati: II / II  Mouth Opening: Normal  TM Distance: > 6 cm  Tongue: Normal  Neck ROM: Normal ROM    Chest/Lungs:  Clear to auscultation, Normal Respiratory Rate    Heart:  Rate: Normal  Rhythm: Regular Rhythm        Anesthesia Plan  Type of Anesthesia, risks & benefits discussed:    Anesthesia Type: Gen Supraglottic Airway  Intra-op Monitoring Plan: Standard ASA Monitors  Post Op Pain Control Plan: multimodal analgesia  Induction:  IV  Informed Consent: Informed consent signed with the Patient and all parties understand the risks and agree with anesthesia plan.  All questions answered.   ASA Score: 3  Day of Surgery Review of History & Physical: H&P Update referred to the surgeon/provider.I have interviewed and examined the patient. I have reviewed the  patient's H&P dated:     Ready For Surgery From Anesthesia Perspective.     .

## 2025-05-01 NOTE — ANESTHESIA PROCEDURE NOTES
Intubation    Date/Time: 5/1/2025 11:19 AM    Performed by: Miguel Briceno CRNA  Authorized by: Miguel Briceno CRNA    Intubation:     Induction:  Intravenous    Intubated:  Postinduction    Mask Ventilation:  Easy mask    Attempts:  1    Attempted By:  CRNA    Difficult Airway Encountered?: No      Complications:  None    Airway Device:  Supraglottic airway/LMA    Airway Device Size:  5.0    Style/Cuff Inflation:  Cuffed (inflated to minimal occlusive pressure)    Placement Verified By:  Capnometry    Complicating Factors:  None    Findings Post-Intubation:  BS equal bilateral

## 2025-05-01 NOTE — PT/OT/SLP EVAL
Physical Therapy Evaluation    Patient Name:  Hola Burrell   MRN:  [id number]    Recommendations:     Discharge Recommendations: No Therapy Indicated   Discharge Equipment Recommendations: none   Barriers to discharge: None    Assessment:     Hola Burrell is a 60 y.o. male admitted with a medical diagnosis of Acute medial meniscus tear, right, initial encounter.  He presents with the following impairments/functional limitations: orthopedic precautions, decreased ROM, gait instability Patient with good understanding of post op education.    Rehab Prognosis: Good; patient would benefit from acute skilled PT services to address these deficits and reach maximum level of function.    Recent Surgery: Procedure(s) (LRB):  ARTHROSCOPY, KNEE, WITH MEDIAL OR LATERAL MENISCECTOMY (Right) * Day of Surgery *    Plan:     During this hospitalization, patient to be seen 1 x/week to address the identified rehab impairments via gait training, therapeutic activities and progress toward the following goals:    Plan of Care Expires:  05/01/25    Subjective     Chief Complaint: post op pain  Patient/Family Comments/goals: Patient states he can use his cane for ambulation  Pain/Comfort:  Pain Rating 1: 5/10  Location - Side 1: Right  Location 1: knee  Pain Addressed 1: Cessation of Activity, Pre-medicate for activity    Patients cultural, spiritual, Gnosticism conflicts given the current situation: no    Living Environment:  Lives with spouse  Prior to admission, patients level of function was independent.  Equipment used at home: cane, straight.  DME owned (not currently used): none.  Upon discharge, patient will have assistance from spouse.    Objective:     Communicated with nurse prior to session.  Patient found supine with    upon PT entry to room.    General Precautions: Standard, fall  Orthopedic Precautions:    Braces:    Respiratory Status: Room air    Exams:  na    Functional Mobility:  Gait: ambulated 20 feet  cga, wbt right le      AM-PAC 6 CLICK MOBILITY  Total Score:24       Treatment & Education:  HEP: ascope protocol    Patient left supine with call button in reach.    GOALS:   Multidisciplinary Problems       Physical Therapy Goals       Not on file                    DME Justifications:  No DME recommended requiring DME justifications    History:     Past Medical History:   Diagnosis Date    Alcohol dependence with uncomplicated intoxication     Asthma     Bilateral primary osteoarthritis of hip     Bipolar disorder     Drug use     Herpes simplex     hyperlipidemia     Hypertension     Syncope and collapse        History reviewed. No pertinent surgical history.    Time Tracking:     PT Received On: 05/01/25  PT Start Time: 1400     PT Stop Time: 1425  PT Total Time (min): 25 min     Billable Minutes: Evaluation 25 05/01/2025

## 2025-05-01 NOTE — BRIEF OP NOTE
"[facility] - Orthopedic Periop Services  Brief Operative Note    Surgery Date: 5/1/2025     Surgeons and Role:     * Germán Solitario MD - Primary    Assisting Surgeon: None    Pre-op Diagnosis:  Acute pain of right knee [M25.561]  Acute medial meniscus tear, right, initial encounter [S83.241A]    Post-op Diagnosis:  Post-Op Diagnosis Codes:     * Acute pain of right knee [M25.561]     * Acute medial meniscus tear, right, initial encounter [S83.241A]    Procedure(s) (LRB):  ARTHROSCOPY, KNEE, WITH MEDIAL OR LATERAL MENISCECTOMY (Right)    Anesthesia: General    Operative Findings:  Patient underwent a right knee arthroscopy without complication.    Estimated Blood Loss: * No values recorded between 5/1/2025 11:14 AM and 5/1/2025 12:26 PM *         Specimens:   Specimen (24h ago, onward)      None            * No specimens in log *        Discharge Note    OUTCOME: Patient tolerated treatment/procedure well without complication and is now ready for discharge.    DISPOSITION: Home or Self Care    FINAL DIAGNOSIS:  Acute medial meniscus tear, right, initial encounter    FOLLOWUP: In clinic    DISCHARGE INSTRUCTIONS:    Discharge Procedure Orders   CRUTCHES FOR HOME USE     Order Specific Question Answer Comments   Type: Axillary    Height: 6' 4" (1.93 m)    Weight: 113.4 kg (250 lb)    Length of need (1-99 months): 2      Diet general     Keep surgical extremity elevated     Ice to affected area   Order Comments: using barrier between ice and skin (specify duration&frequency)     Remove dressing in 72 hours   Order Comments: Keep dressing in place for 72 hours     Change dressing (specify)   Order Comments: Dressing change: one time per day beginning 72 hours post op.     Call MD for:  temperature >100.4     Call MD for:  persistent nausea and vomiting     Call MD for:  severe uncontrolled pain     Call MD for:  difficulty breathing, headache or visual disturbances     Call MD for:  redness, tenderness, or signs " of infection (pain, swelling, redness, odor or green/yellow discharge around incision site)     Call MD for:  hives     Call MD for:  persistent dizziness or light-headedness     Call MD for:  extreme fatigue     Activity as tolerated     Shower on day dressing removed (No bath)     Weight bearing as tolerated        Clinical Reference Documents Added to Patient Instructions         Document    KNEE ARTHROSCOPY DISCHARGE INSTRUCTIONS (ENGLISH)

## 2025-05-01 NOTE — H&P (VIEW-ONLY)
Department of Orthopedic Surgery    History and Physical       Principal Problem: Acute medial meniscus tear, right, initial encounter [S89.379X]           HISTORY:  60-year-old male with a right knee medial meniscus tear needing arthroscopic evaluation debridement possible repair of the right knee    PAST MEDICAL HISTORY:   Past Medical History:   Diagnosis Date    Alcohol dependence with uncomplicated intoxication     Asthma     Bilateral primary osteoarthritis of hip     Bipolar disorder     Drug use     Herpes simplex     hyperlipidemia     Hypertension     Syncope and collapse         PAST SURGICAL HISTORY: History reviewed. No pertinent surgical history.       ALLERGIES:   Review of patient's allergies indicates:   Allergen Reactions    Desyrel [trazodone]           MEDICATIONS: Prescriptions Prior to Admission[1]     SOCIAL HISTORY: Social History[2]       FAMILY HISTORY:   Family History   Problem Relation Name Age of Onset    Cancer Mother      Cancer Father      Cancer Sister      Mental illness Sister      Cancer Brother      Diabetes Maternal Aunt      Diabetes Maternal Uncle      Hypertension Maternal Grandfather            PHYSICAL EXAM:   Vitals:    04/02/25 1106   BP: 97/69   Pulse:    Resp:      There is no height or weight on file to calculate BMI.     In general, this is a well-developed, well-nourished male . The patient is alert, oriented and cooperative.      HEENT:  Normocephalic, atraumatic.  Extraocular movements are intact bilaterally.  The oropharynx is benign.       NECK:  Nontender with good range of motion.      LUNGS:  Clear to auscultation bilaterally.      HEART:  Demonstrates a regular rate and rhythm.  No murmurs appreciated.      ABDOMEN:  Soft, non-tender, non-distended.        EXTREMITIES:  Right lower extremity moves his toes has sensation to touch has palpable pulses tender to palpation over his posteromedial joint line pain on David's testing no instability in the knee  is noted does have an effusion.      RADIOGRAPHIC FINDINGS:  X-rays show no fracture subluxation of the right knee.  MRI shows medial meniscus tear with an effusion right knee.      IMPRESSION:  Medial meniscus tear right knee      PLAN:  Arthroscopic evaluation debridement possible repair right knee    I had a long discussion with the patient about treatment options, including operative and nonoperative treatments. We discussed pros and cons of each including risks pertinent to surgery including pain, infection, bleeding, damage to adjacent structures like nerves and blood vessels, failure to heal, need for future surgeries, stiffness, instability, loss of limb, anesthesia risks like stroke, blood clot, loss of life. We discussed the possibility of need for later hardware removal in the case that hardware was used. We discussed common and uncommon risks, and discussed patient specific factors that may increase the risks present with surgery. All questions were answered. The patient expressed understanding of the pros and cons of surgery and wanted to proceed with surgical treatment.        (Subject to voice recognition error, transcription service not allowed)           [1] (Not in a hospital admission)  [2]   Social History  Socioeconomic History    Marital status: Single   Tobacco Use    Smoking status: Every Day     Current packs/day: 1.50     Average packs/day: 1.5 packs/day for 40.0 years (60.0 ttl pk-yrs)     Types: Cigarettes     Passive exposure: Past    Smokeless tobacco: Current    Tobacco comments:     vapoing   Substance and Sexual Activity    Alcohol use: Not Currently    Drug use: Yes     Types: Marijuana    Sexual activity: Not Currently     Social Drivers of Health     Financial Resource Strain: High Risk (10/17/2023)    Overall Financial Resource Strain (CARDIA)     Difficulty of Paying Living Expenses: Very hard   Food Insecurity: Food Insecurity Present (10/17/2023)    Hunger Vital Sign      Worried About Running Out of Food in the Last Year: Often true     Ran Out of Food in the Last Year: Often true   Transportation Needs: No Transportation Needs (10/17/2023)    PRAPARE - Transportation     Lack of Transportation (Medical): No     Lack of Transportation (Non-Medical): No   Physical Activity: Inactive (10/17/2023)    Exercise Vital Sign     Days of Exercise per Week: 0 days     Minutes of Exercise per Session: 0 min   Stress: Stress Concern Present (10/17/2023)    [employer] - Occupational Stress Questionnaire     Feeling of Stress : To some extent   Housing Stability: Low Risk  (10/17/2023)    Housing Stability Vital Sign     Unable to Pay for Housing in the Last Year: No     Number of Places Lived in the Last Year: 1     Unstable Housing in the Last Year: No

## 2025-05-01 NOTE — OP NOTE
[facility] - Orthopedic Periop Services  General Surgery  Operative Note    SUMMARY     Date of Procedure: 5/1/2025     Procedure: Procedure(s) (LRB):  ARTHROSCOPY, KNEE, WITH MEDIAL OR LATERAL MENISCECTOMY (Right)       Surgeons and Role:     * Germán Solitario MD - Primary    Assisting Surgeon: None    Pre-Operative Diagnosis: Acute pain of right knee [M25.561]  Acute medial meniscus tear, right, initial encounter [S83.241A]    Post-Operative Diagnosis: Post-Op Diagnosis Codes:     * Acute pain of right knee [M25.561]     * Acute medial meniscus tear, right, initial encounter [S83.241A]    Anesthesia: General    Operative Findings (including complications, if any):  After having risks and benefits of procedure explained at length the patient stating that he understands risks benefits written informed consent was obtained patient was taken operating room placed in supine position on operative table anesthesia induced per Anesthesia.  At this time his right lower extremity was placed in arthroscopic leg lemus left leg placed on a pillow in the foot of the table let down.  Right lower extremity prepped draped sterile fashion.  Inferior medial and inferior portals marked on the skin inferior lateral portal made with a 11. Blade blunt trocar used to introduce the cannula into the knee.  Camera introduced knee inflated with saline.  There was large amount of effusion noted large amount of synovitis.  Patient has signs of inflammatory arthritic changes.  At this time the patellas track is normal position on the femur there was some grade 1-2 chondromalacia of the patellofemoral joint.  Medial gutter no pathology medial joint line complex tear with a maceration of the medial meniscus noted there was some grade 1-2 chondromalacia medial joint line.  ACL PCL popliteus tendons were intact lateral meniscus had a tear of the body extending of the anterior horn of the lateral meniscus.  Medial portal made probe placed in  meniscus tears confirmed chondromalacia confirmed ACL PCL popliteus tendons intact.  Biter placed in medial compartment medial meniscus tear debrided shaver used to remove any debrided material smooth edge.  Probe placed in the further tears medially.  Biter placed in lateral compartment lateral meniscus tear debrided shaver used to remove any debrided material smooth edge.  Probe placed in the further tears.  Knee inspected no further pathology.  Instruments fluid removed from the knee.  Portals closed with 4-0 nylon horizontal mattress suture.  Sterile occlusive dressing placed.  Patient was awakened taken recovery room in good condition.  All counts correct.  No complications.    Description of Technical Procedures:     Significant Surgical Tasks Conducted by the Assistant(s), if Applicable:     Estimated Blood Loss (EBL): * No values recorded between 5/1/2025 11:14 AM and 5/1/2025 12:18 PM *         10cc  Implants: * No implants in log *    Specimens:   Specimen (24h ago, onward)      None                    Condition: Good    Disposition: PACU - hemodynamically stable.    Attestation: I was present and scrubbed for the entire procedure.

## 2025-05-01 NOTE — INTERVAL H&P NOTE
The patient has been examined and the H&P has been reviewed:    I concur with the findings and no changes have occurred since H&P was written.    Surgery risks, benefits and alternative options discussed and understood by patient/family.          Active Hospital Problems    Diagnosis  POA    *Acute medial meniscus tear, right, initial encounter [S83.438A]  Yes      Resolved Hospital Problems   No resolved problems to display.

## 2025-05-01 NOTE — BRIEF OP NOTE
"[facility] - Orthopedic Periop Services  Brief Operative Note    Surgery Date: 5/1/2025     Surgeons and Role:     * Germán Solitario MD - Primary    Assisting Surgeon: None    Pre-op Diagnosis:  Acute pain of right knee [M25.561]  Acute medial meniscus tear, right, initial encounter [S83.241A]    Post-op Diagnosis:  Post-Op Diagnosis Codes:     * Acute pain of right knee [M25.561]     * Acute medial meniscus tear, right, initial encounter [S83.241A]    Procedure(s) (LRB):  ARTHROSCOPY, KNEE, WITH MEDIAL OR LATERAL MENISCECTOMY (Right)    Anesthesia: General    Operative Findings:  Patient underwent a right knee arthroscopy without complication.    Estimated Blood Loss: * No values recorded between 5/1/2025 11:14 AM and 5/1/2025 12:21 PM *         Specimens:   Specimen (24h ago, onward)      None            * No specimens in log *        Discharge Note    OUTCOME: Patient tolerated treatment/procedure well without complication and is now ready for discharge.    DISPOSITION: Home or Self Care    FINAL DIAGNOSIS:  Acute medial meniscus tear, right, initial encounter    FOLLOWUP: In clinic    DISCHARGE INSTRUCTIONS:    Discharge Procedure Orders   CRUTCHES FOR HOME USE     Order Specific Question Answer Comments   Type: Axillary    Height: 6' 4" (1.93 m)    Weight: 113.4 kg (250 lb)    Length of need (1-99 months): 2      Diet general     Keep surgical extremity elevated     Ice to affected area   Order Comments: using barrier between ice and skin (specify duration&frequency)     Remove dressing in 72 hours   Order Comments: Keep dressing in place for 72 hours     Change dressing (specify)   Order Comments: Dressing change: one time per day beginning 72 hours post op.     Call MD for:  temperature >100.4     Call MD for:  persistent nausea and vomiting     Call MD for:  severe uncontrolled pain     Call MD for:  difficulty breathing, headache or visual disturbances     Call MD for:  redness, tenderness, or signs " of infection (pain, swelling, redness, odor or green/yellow discharge around incision site)     Call MD for:  hives     Call MD for:  persistent dizziness or light-headedness     Call MD for:  extreme fatigue     Activity as tolerated     Shower on day dressing removed (No bath)     Weight bearing as tolerated

## 2025-05-19 ENCOUNTER — OFFICE VISIT (OUTPATIENT)
Dept: ORTHOPEDICS | Facility: CLINIC | Age: 60
End: 2025-05-19
Payer: MEDICARE

## 2025-05-19 VITALS
HEART RATE: 122 BPM | WEIGHT: 219.69 LBS | SYSTOLIC BLOOD PRESSURE: 105 MMHG | DIASTOLIC BLOOD PRESSURE: 85 MMHG | BODY MASS INDEX: 26.74 KG/M2

## 2025-05-19 DIAGNOSIS — Z98.890 STATUS POST ARTHROSCOPY OF RIGHT KNEE: Primary | ICD-10-CM

## 2025-05-19 PROCEDURE — 99214 OFFICE O/P EST MOD 30 MIN: CPT | Mod: PBBFAC | Performed by: ORTHOPAEDIC SURGERY

## 2025-05-19 PROCEDURE — 99024 POSTOP FOLLOW-UP VISIT: CPT | Mod: S$PBB,,, | Performed by: ORTHOPAEDIC SURGERY

## 2025-05-19 PROCEDURE — 3074F SYST BP LT 130 MM HG: CPT | Mod: CPTII,,, | Performed by: ORTHOPAEDIC SURGERY

## 2025-05-19 PROCEDURE — 3008F BODY MASS INDEX DOCD: CPT | Mod: CPTII,,, | Performed by: ORTHOPAEDIC SURGERY

## 2025-05-19 PROCEDURE — 1159F MED LIST DOCD IN RCRD: CPT | Mod: CPTII,,, | Performed by: ORTHOPAEDIC SURGERY

## 2025-05-19 PROCEDURE — 3079F DIAST BP 80-89 MM HG: CPT | Mod: CPTII,,, | Performed by: ORTHOPAEDIC SURGERY

## 2025-05-19 PROCEDURE — 99999 PR PBB SHADOW E&M-EST. PATIENT-LVL IV: CPT | Mod: PBBFAC,,, | Performed by: ORTHOPAEDIC SURGERY

## 2025-05-19 RX ORDER — HYDROMORPHONE HYDROCHLORIDE 4 MG/1
4 TABLET ORAL EVERY 6 HOURS PRN
Qty: 28 TABLET | Refills: 0 | Status: SHIPPED | OUTPATIENT
Start: 2025-05-19

## 2025-05-19 NOTE — PROGRESS NOTES
Patient is here follow-up of the right knee he had the arthroscopy performed.  In his wounds are clean and dry.  Stitches DC.  Neurovascularly he is intact distally.  Does have an inflammatory markers for inflammatory arthritis.  He is having pain in his right wrist.  I will check him back in 6 weeks with the knee.  I will get x-rays in his right wrist at that time.  He is neurovascularly intact distally in her right leg.  I will refer him to a rheumatologist.

## 2025-05-20 ENCOUNTER — TELEPHONE (OUTPATIENT)
Dept: ORTHOPEDICS | Facility: CLINIC | Age: 60
End: 2025-05-20
Payer: MEDICARE

## 2025-05-20 NOTE — TELEPHONE ENCOUNTER
Called and spoke Sofia and informed her that  is in surgery on Tuesday and I will call her back on Wednesday once he gets into the office.

## 2025-05-20 NOTE — TELEPHONE ENCOUNTER
Copied from CRM #[id number]. Topic: Medications - Pharmacy  >> May 20, 2025  9:35 AM Sandra wrote:  Who Called: Sofia from [facility]    Caller is requesting assistance/information from provider's office.    Sofia calling to say she has the 2 milligram in stock of the pain med subscribed to the patient but does not have the 4 milligram. She was wondering if the prescription can be sent back over to the pharmacy for the 2 mg.      Preferred Method of Contact: Phone Call    Best Call Back Number, if different: [phone]  Additional Information:

## 2025-05-21 DIAGNOSIS — Z98.890 STATUS POST ARTHROSCOPY OF RIGHT KNEE: Primary | ICD-10-CM

## 2025-05-21 RX ORDER — HYDROMORPHONE HYDROCHLORIDE 2 MG/1
2 TABLET ORAL EVERY 4 HOURS PRN
Qty: 28 TABLET | Refills: 0 | Status: SHIPPED | OUTPATIENT
Start: 2025-05-21

## 2025-05-21 NOTE — TELEPHONE ENCOUNTER
Copied from CRM #[id number]. Topic: General Inquiry - Patient Advice  >> May 20, 2025 11:07 AM Katina wrote:  Who Called: Hola Paytno Ashanti    Refill or New Rx:Refill  RX Name and Strength:HYDROmorphone (DILAUDID) 4 MG tablet  How is the patient currently taking it? (ex. 1XDay): Take 1 tablet (4 mg total) by mouth every 6 (six) hours as needed for Pain.  Is this a 30 day or 90 day RX: 30 day   Local or Mail Order: local   List of preferred pharmacies on file (remove unneeded): [facility] [address] [place] [address] [address] [place] [id number]  Phone: [phone] Fax: [phone]      Ordering Provider: Dr. Solitario       Preferred Method of Contact: Phone Call  Patient's Preferred Phone Number on File: [phone]     Additional Information: pt state the pharmacy said they do not have prescribe in 4mg. They only have it in the 2mg. Please contact pt for more info

## 2025-06-04 ENCOUNTER — OFFICE VISIT (OUTPATIENT)
Dept: FAMILY MEDICINE | Facility: CLINIC | Age: 60
End: 2025-06-04
Payer: MEDICARE

## 2025-06-04 VITALS
DIASTOLIC BLOOD PRESSURE: 84 MMHG | HEIGHT: 76 IN | BODY MASS INDEX: 26.55 KG/M2 | RESPIRATION RATE: 18 BRPM | OXYGEN SATURATION: 100 % | WEIGHT: 218 LBS | TEMPERATURE: 97 F | SYSTOLIC BLOOD PRESSURE: 132 MMHG | HEART RATE: 93 BPM

## 2025-06-04 DIAGNOSIS — E87.6 HYPOKALEMIA: ICD-10-CM

## 2025-06-04 DIAGNOSIS — M06.9 RHEUMATOID ARTHRITIS, INVOLVING UNSPECIFIED SITE, UNSPECIFIED WHETHER RHEUMATOID FACTOR PRESENT: ICD-10-CM

## 2025-06-04 DIAGNOSIS — N40.0 BENIGN PROSTATIC HYPERPLASIA, UNSPECIFIED WHETHER LOWER URINARY TRACT SYMPTOMS PRESENT: Primary | ICD-10-CM

## 2025-06-04 PROCEDURE — 3075F SYST BP GE 130 - 139MM HG: CPT | Mod: ,,, | Performed by: INTERNAL MEDICINE

## 2025-06-04 PROCEDURE — 3079F DIAST BP 80-89 MM HG: CPT | Mod: ,,, | Performed by: INTERNAL MEDICINE

## 2025-06-04 PROCEDURE — 99214 OFFICE O/P EST MOD 30 MIN: CPT | Mod: ,,, | Performed by: INTERNAL MEDICINE

## 2025-06-04 PROCEDURE — 1159F MED LIST DOCD IN RCRD: CPT | Mod: ,,, | Performed by: INTERNAL MEDICINE

## 2025-06-04 PROCEDURE — 3008F BODY MASS INDEX DOCD: CPT | Mod: ,,, | Performed by: INTERNAL MEDICINE

## 2025-06-04 RX ORDER — TAMSULOSIN HYDROCHLORIDE 0.4 MG/1
2 CAPSULE ORAL DAILY
Qty: 90 CAPSULE | Refills: 0 | Status: SHIPPED | OUTPATIENT
Start: 2025-06-04

## 2025-06-04 RX ORDER — IBUPROFEN 800 MG/1
800 TABLET, FILM COATED ORAL EVERY 8 HOURS PRN
Qty: 30 TABLET | Refills: 1 | Status: SHIPPED | OUTPATIENT
Start: 2025-06-04

## 2025-06-04 RX ORDER — HYDROXYCHLOROQUINE SULFATE 200 MG/1
200 TABLET, FILM COATED ORAL 2 TIMES DAILY
Qty: 120 TABLET | Refills: 1 | Status: SHIPPED | OUTPATIENT
Start: 2025-06-04

## 2025-06-04 RX ORDER — POTASSIUM CHLORIDE 750 MG/1
20 TABLET, EXTENDED RELEASE ORAL DAILY
Qty: 90 TABLET | Refills: 1 | Status: SHIPPED | OUTPATIENT
Start: 2025-06-04

## 2025-07-01 ENCOUNTER — HOSPITAL ENCOUNTER (OUTPATIENT)
Dept: RADIOLOGY | Facility: HOSPITAL | Age: 60
Discharge: HOME | End: 2025-07-01
Attending: NURSE PRACTITIONER
Payer: MEDICARE

## 2025-07-01 ENCOUNTER — OFFICE VISIT (OUTPATIENT)
Dept: ORTHOPEDICS | Facility: CLINIC | Age: 60
End: 2025-07-01
Payer: MEDICARE

## 2025-07-01 VITALS — BODY MASS INDEX: 26.55 KG/M2 | WEIGHT: 218.06 LBS | HEIGHT: 76 IN

## 2025-07-01 DIAGNOSIS — Z98.890 S/P RIGHT KNEE ARTHROSCOPY: ICD-10-CM

## 2025-07-01 DIAGNOSIS — M25.531 RIGHT WRIST PAIN: Primary | ICD-10-CM

## 2025-07-01 DIAGNOSIS — M25.531 RIGHT WRIST PAIN: ICD-10-CM

## 2025-07-01 PROCEDURE — 99024 POSTOP FOLLOW-UP VISIT: CPT | Mod: ,,, | Performed by: NURSE PRACTITIONER

## 2025-07-01 PROCEDURE — 73110 X-RAY EXAM OF WRIST: CPT | Mod: 26,RT,, | Performed by: RADIOLOGY

## 2025-07-01 PROCEDURE — 1159F MED LIST DOCD IN RCRD: CPT | Mod: CPTII,,, | Performed by: NURSE PRACTITIONER

## 2025-07-01 PROCEDURE — 99999 PR PBB SHADOW E&M-EST. PATIENT-LVL IV: CPT | Mod: PBBFAC,,, | Performed by: NURSE PRACTITIONER

## 2025-07-01 PROCEDURE — 73110 X-RAY EXAM OF WRIST: CPT | Mod: TC,RT

## 2025-07-01 PROCEDURE — 99214 OFFICE O/P EST MOD 30 MIN: CPT | Mod: PBBFAC,25 | Performed by: NURSE PRACTITIONER

## 2025-07-01 NOTE — PROGRESS NOTES
HISTORY OF PRESENT ILLNESS:       Arthroscopy, Knee, With Medial Or Lateral Meniscectomy - Right 5/1/2025       Pt is here today for Second post-operative followup of his knee arthroscopy.      he is doing well.  He states he is not doing his HEP that he feels his walking around every day is therapy enough.     Dr. Solitario had planned to xray his right wrist at his next visit back.  He complains of pain in his right wrist.  Reports he has pain in multiple joints.  He has an appointment with Rheumatology in December.  He is here today 8 weeks postop right knee arthroscopy.  His incision sites look good well healed.  Overall doing well in regards to his knee      We have reviewed his findings and discussed plan of care and future treatment options, including the physical therapy plan.                                                                                     PHYSICAL EXAMINATION:     Incision sites healed well  No evidence of any erythema, infection or induration  Range of motion 0-120 degrees  Minimal effusion  2+ DP pulse                                                                                 ASSESSMENT:                                                                                                                                               1. Status post above, doing well.                                                                                                                               PLAN:                                                                                                                                                     1. We will put him in a right wrist brace today.  Follow up with Dr. Solitario in 3-4 weeks  There are no Patient Instructions on file for this visit.

## 2025-07-13 DIAGNOSIS — N40.0 BENIGN PROSTATIC HYPERPLASIA, UNSPECIFIED WHETHER LOWER URINARY TRACT SYMPTOMS PRESENT: ICD-10-CM

## 2025-07-15 RX ORDER — TAMSULOSIN HYDROCHLORIDE 0.4 MG/1
2 CAPSULE ORAL
Qty: 90 CAPSULE | Refills: 0 | Status: SHIPPED | OUTPATIENT
Start: 2025-07-15

## 2025-07-16 NOTE — TRANSFER OF CARE
"Anesthesia Transfer of Care Note    Patient: Hola Burrell    Procedure(s) Performed: Procedure(s) (LRB):  ARTHROSCOPY, KNEE, WITH MEDIAL OR LATERAL MENISCECTOMY (Right)    Patient location: PACU    Anesthesia Type: general    Transport from OR: Transported from OR on room air with adequate spontaneous ventilation    Post pain: adequate analgesia    Post assessment: no apparent anesthetic complications    Post vital signs: stable    Level of consciousness: sedated    Nausea/Vomiting: no nausea/vomiting    Complications: none    Transfer of care protocol was followed      Last vitals: Visit Vitals  /68   Pulse 85   Temp 36.7 °C (98 °F) (Oral)   Resp (!) 25   Ht 6' 4" (1.93 m)   Wt 113.4 kg (250 lb)   SpO2 (!) 93%   BMI 30.43 kg/m²     " Placed lab orders to be mailed out

## 2025-07-30 ENCOUNTER — OFFICE VISIT (OUTPATIENT)
Dept: ORTHOPEDICS | Facility: CLINIC | Age: 60
End: 2025-07-30
Payer: MEDICARE

## 2025-07-30 VITALS — BODY MASS INDEX: 26.55 KG/M2 | HEIGHT: 76 IN | WEIGHT: 218.06 LBS

## 2025-07-30 DIAGNOSIS — M19.011 ARTHRITIS OF RIGHT SHOULDER: Primary | ICD-10-CM

## 2025-07-30 PROCEDURE — 99999 PR PBB SHADOW E&M-EST. PATIENT-LVL IV: CPT | Mod: PBBFAC,,, | Performed by: ORTHOPAEDIC SURGERY

## 2025-07-30 PROCEDURE — 99214 OFFICE O/P EST MOD 30 MIN: CPT | Mod: PBBFAC | Performed by: ORTHOPAEDIC SURGERY

## 2025-07-30 NOTE — PROGRESS NOTES
Patient is here for complaint in his right shoulder.  He has had an MRI of his knee shows he has a meniscus tear with a large cyst.  He has rheumatoid arthritis with wrist arthritic changes.  In his knee he has had the arthroscopy in the debridement of the meniscus tear.  His effusion in his greatly decreased at this time.  Neurovascularly he is intact distally.  In his shoulder he is having some pain on range motion in his x-rays show he has some degenerative changes.  I am going to get an MRI of the shoulder I am concerned about a rotator cuff tear if he does have degenerative changes in his cuff tear of the inner we would be a reverse shoulder.  At this time let him use his arm as tolerates.  He does not wish an injection.  I will check him back after the MRI of the shoulder.  He has taken anti-inflammatories without relief the symptoms.

## 2025-07-30 NOTE — PATIENT INSTRUCTIONS
MRI right shoulder scheduled at [facility] on 8/7/25 at 1:00 p.m..  Call our office 1-2 days following your procedure to get your results. Telephone #[phone]

## 2025-08-07 ENCOUNTER — HOSPITAL ENCOUNTER (OUTPATIENT)
Dept: RADIOLOGY | Facility: HOSPITAL | Age: 60
Discharge: HOME | End: 2025-08-07
Attending: ORTHOPAEDIC SURGERY
Payer: MEDICARE

## 2025-08-07 DIAGNOSIS — M19.011 ARTHRITIS OF RIGHT SHOULDER: ICD-10-CM

## 2025-08-07 PROCEDURE — 73221 MRI JOINT UPR EXTREM W/O DYE: CPT | Mod: TC,RT

## 2025-08-12 ENCOUNTER — TELEPHONE (OUTPATIENT)
Dept: ORTHOPEDICS | Facility: CLINIC | Age: 60
End: 2025-08-12
Payer: MEDICARE

## 2025-08-20 ENCOUNTER — OFFICE VISIT (OUTPATIENT)
Dept: ORTHOPEDICS | Facility: CLINIC | Age: 60
End: 2025-08-20
Payer: MEDICARE

## 2025-08-20 VITALS
OXYGEN SATURATION: 100 % | HEART RATE: 101 BPM | BODY MASS INDEX: 26.55 KG/M2 | WEIGHT: 218.06 LBS | SYSTOLIC BLOOD PRESSURE: 115 MMHG | DIASTOLIC BLOOD PRESSURE: 72 MMHG | HEIGHT: 76 IN

## 2025-08-20 DIAGNOSIS — M17.10 ARTHRITIS OF KNEE: Primary | ICD-10-CM

## 2025-08-20 PROCEDURE — 20610 DRAIN/INJ JOINT/BURSA W/O US: CPT | Mod: PBBFAC,RT | Performed by: ORTHOPAEDIC SURGERY

## 2025-08-20 PROCEDURE — 3078F DIAST BP <80 MM HG: CPT | Mod: CPTII,,, | Performed by: ORTHOPAEDIC SURGERY

## 2025-08-20 PROCEDURE — 1159F MED LIST DOCD IN RCRD: CPT | Mod: CPTII,,, | Performed by: ORTHOPAEDIC SURGERY

## 2025-08-20 PROCEDURE — 99999PBSHW PR PBB SHADOW TECHNICAL ONLY FILED TO HB: Mod: PBBFAC,,,

## 2025-08-20 PROCEDURE — 99999 PR PBB SHADOW E&M-EST. PATIENT-LVL IV: CPT | Mod: PBBFAC,,, | Performed by: ORTHOPAEDIC SURGERY

## 2025-08-20 PROCEDURE — 99213 OFFICE O/P EST LOW 20 MIN: CPT | Mod: S$PBB,25,, | Performed by: ORTHOPAEDIC SURGERY

## 2025-08-20 PROCEDURE — 99214 OFFICE O/P EST MOD 30 MIN: CPT | Mod: PBBFAC,25 | Performed by: ORTHOPAEDIC SURGERY

## 2025-08-20 PROCEDURE — 3008F BODY MASS INDEX DOCD: CPT | Mod: CPTII,,, | Performed by: ORTHOPAEDIC SURGERY

## 2025-08-20 PROCEDURE — 3074F SYST BP LT 130 MM HG: CPT | Mod: CPTII,,, | Performed by: ORTHOPAEDIC SURGERY

## 2025-08-20 RX ORDER — TRIAMCINOLONE ACETONIDE 40 MG/ML
40 INJECTION, SUSPENSION INTRA-ARTICULAR; INTRAMUSCULAR
Status: DISCONTINUED | OUTPATIENT
Start: 2025-08-20 | End: 2025-08-20 | Stop reason: HOSPADM

## 2025-08-20 RX ORDER — BUPIVACAINE HYDROCHLORIDE 2.5 MG/ML
1 INJECTION, SOLUTION EPIDURAL; INFILTRATION; INTRACAUDAL; PERINEURAL
Status: DISCONTINUED | OUTPATIENT
Start: 2025-08-20 | End: 2025-08-20 | Stop reason: HOSPADM

## 2025-08-20 RX ADMIN — TRIAMCINOLONE ACETONIDE 40 MG: 40 INJECTION, SUSPENSION INTRA-ARTICULAR; INTRAMUSCULAR at 09:08

## 2025-08-20 RX ADMIN — BUPIVACAINE HYDROCHLORIDE 1 ML: 2.5 INJECTION, SOLUTION EPIDURAL; INFILTRATION; INTRACAUDAL; PERINEURAL at 09:08

## 2025-08-25 ENCOUNTER — PATIENT MESSAGE (OUTPATIENT)
Facility: HOSPITAL | Age: 60
End: 2025-08-25
Payer: MEDICARE

## (undated) DEVICE — TUBING CROSSFLOW INFLOW CASS

## (undated) DEVICE — SPONGE GZ WOVEN 12-PLY 4X4IN

## (undated) DEVICE — SOL NACL IRR 3000ML

## (undated) DEVICE — GLOVE SENSICARE PI SURG 8.5

## (undated) DEVICE — BANDAGE PRCAR COMPR 11YDX6IN

## (undated) DEVICE — DEVICE SUCTION H20 BROOM 12FT

## (undated) DEVICE — GOWN NONREINF SET-IN SLV 2XL

## (undated) DEVICE — CANISTER SUCTION MEDI-VAC 12L

## (undated) DEVICE — PACK KNEE ARTHROSCOPY  RUSH

## (undated) DEVICE — SOLIDIFIER BTL W/TREAT 1500CC

## (undated) DEVICE — SYR 30CC LUER LOCK

## (undated) DEVICE — APPLICATOR CHLORAPREP ORN 26ML

## (undated) DEVICE — GLOVE SENSICARE PI GRN 8.5

## (undated) DEVICE — SHAVER TOMCAT 4.0